# Patient Record
Sex: MALE | Race: WHITE | NOT HISPANIC OR LATINO | Employment: FULL TIME | URBAN - NONMETROPOLITAN AREA
[De-identification: names, ages, dates, MRNs, and addresses within clinical notes are randomized per-mention and may not be internally consistent; named-entity substitution may affect disease eponyms.]

---

## 2023-12-12 ENCOUNTER — OFFICE VISIT (OUTPATIENT)
Dept: PRIMARY CARE | Facility: CLINIC | Age: 63
End: 2023-12-12
Payer: COMMERCIAL

## 2023-12-12 ENCOUNTER — ANCILLARY PROCEDURE (OUTPATIENT)
Dept: RADIOLOGY | Facility: CLINIC | Age: 63
End: 2023-12-12
Payer: COMMERCIAL

## 2023-12-12 VITALS
SYSTOLIC BLOOD PRESSURE: 190 MMHG | HEIGHT: 71 IN | HEART RATE: 86 BPM | WEIGHT: 229.8 LBS | BODY MASS INDEX: 32.17 KG/M2 | OXYGEN SATURATION: 96 % | DIASTOLIC BLOOD PRESSURE: 110 MMHG

## 2023-12-12 DIAGNOSIS — M25.511 CHRONIC RIGHT SHOULDER PAIN: ICD-10-CM

## 2023-12-12 DIAGNOSIS — G89.29 CHRONIC RIGHT SHOULDER PAIN: ICD-10-CM

## 2023-12-12 DIAGNOSIS — I16.1 HYPERTENSIVE EMERGENCY: ICD-10-CM

## 2023-12-12 DIAGNOSIS — M25.552 LEFT HIP PAIN: Primary | ICD-10-CM

## 2023-12-12 DIAGNOSIS — I10 PRIMARY HYPERTENSION: ICD-10-CM

## 2023-12-12 PROCEDURE — 3080F DIAST BP >= 90 MM HG: CPT | Performed by: STUDENT IN AN ORGANIZED HEALTH CARE EDUCATION/TRAINING PROGRAM

## 2023-12-12 PROCEDURE — 73030 X-RAY EXAM OF SHOULDER: CPT | Mod: RIGHT SIDE | Performed by: RADIOLOGY

## 2023-12-12 PROCEDURE — 1036F TOBACCO NON-USER: CPT | Performed by: STUDENT IN AN ORGANIZED HEALTH CARE EDUCATION/TRAINING PROGRAM

## 2023-12-12 PROCEDURE — 73030 X-RAY EXAM OF SHOULDER: CPT | Mod: RT

## 2023-12-12 PROCEDURE — 99214 OFFICE O/P EST MOD 30 MIN: CPT | Performed by: STUDENT IN AN ORGANIZED HEALTH CARE EDUCATION/TRAINING PROGRAM

## 2023-12-12 PROCEDURE — 3077F SYST BP >= 140 MM HG: CPT | Performed by: STUDENT IN AN ORGANIZED HEALTH CARE EDUCATION/TRAINING PROGRAM

## 2023-12-12 RX ORDER — LISINOPRIL 10 MG/1
10 TABLET ORAL DAILY
Qty: 30 TABLET | Refills: 5 | Status: SHIPPED | OUTPATIENT
Start: 2023-12-12 | End: 2024-01-03 | Stop reason: ALTCHOICE

## 2023-12-12 RX ORDER — TEA TREE OIL 100 %
SPRAY, NON-AEROSOL (ML) TOPICAL
COMMUNITY

## 2023-12-12 RX ORDER — PROPRANOLOL HYDROCHLORIDE 20 MG/1
20 TABLET ORAL 2 TIMES DAILY
Qty: 60 TABLET | Refills: 5 | Status: SHIPPED | OUTPATIENT
Start: 2023-12-12 | End: 2024-01-15 | Stop reason: ALTCHOICE

## 2023-12-12 RX ORDER — AMLODIPINE BESYLATE 5 MG/1
5 TABLET ORAL DAILY
Qty: 30 TABLET | Refills: 5 | Status: SHIPPED | OUTPATIENT
Start: 2023-12-12 | End: 2024-01-03 | Stop reason: SDUPTHER

## 2023-12-12 RX ORDER — BACLOFEN 10 MG/1
10 TABLET ORAL 2 TIMES DAILY
Qty: 60 TABLET | Refills: 5 | Status: SHIPPED | OUTPATIENT
Start: 2023-12-12 | End: 2024-03-29 | Stop reason: ALTCHOICE

## 2023-12-12 NOTE — PROGRESS NOTES
Subjective   Patient ID: Jesus Rand is a 63 y.o. male who presents for Referral  (PT is here today for a referral to ortho. Reports he is having left hip pain. Denies injury. Reports this has been an on going issues, he has had an xray done and was told it was full of arthritis and would need a total hip replacement, was being followed by ortho but 2 days before appointment, he had to cancel the appointment due to a family emergency. Does not know if he needs another referral for them to also look at his right rotator cuff. ).    HPI    HTN:   His blood pressure is significantly high today.  No chest pain now. Chest pain a couple of days ago. No chest pain today.  No shortness of breath, palpitation, headache or blurry vision.  Starting him on Amlodipine and lisinopril. Also prescribing Propranolol for short term.  Discussed the potential for having a CVA or CAD.    Depression: His wife passed away about 6 months ago.  He is still having significant symptoms of grief and depression.  Reports that he cries most days.  Has a 18-year-old at home.  Plan: He likely needs medication and counseling.  However patient would like to wait for now.  Not starting medication as we are starting few medications for his blood pressure today.    Hip pain: Left hip arthritis was diagnosed about a year and a half ago.  Is bothering his sleep now.  Previously was recommended to get surgical intervention.  Family circumstances he was not able to get this taken care of.  Now he would like to see a specialist to discuss further interventions.  Requesting something for pain  Referral to Ortho.  Given that his blood pressure is high recommended to limit use of ibuprofen.  Starting on baclofen.     Right shoulder pain: The last few months.  It is bothering significantly now.  Wakes him up from sleep sometimes.  Rotator cuff injury was repaired many years ago.  Reports that his symptoms are similar.  X-ray ordered.  Referral to Ortho is  provided.    Review of Systems  ROS negative except discussed above in HPI.    Vitals:    12/12/23 1308   BP: (!) 190/110   Pulse: 86   SpO2: 96%     Objective   Physical Exam      Assessment/Plan   Jesus was seen today for referral .  Diagnoses and all orders for this visit:  Left hip pain (Primary)  -     Referral to Orthopaedic Surgery; Future  -     baclofen (Lioresal) 10 mg tablet; Take 1 tablet (10 mg) by mouth 2 times a day.  Chronic right shoulder pain  -     Referral to Orthopaedic Surgery; Future  -     Cancel: XR shoulder left 2+ views; Future  -     XR shoulder right 2+ views; Future  Hypertensive emergency  -     amLODIPine (Norvasc) 5 mg tablet; Take 1 tablet (5 mg) by mouth once daily.  -     lisinopril 10 mg tablet; Take 1 tablet (10 mg) by mouth once daily.  -     propranolol (Inderal) 20 mg tablet; Take 1 tablet (20 mg) by mouth 2 times a day.  Primary hypertension  -     amLODIPine (Norvasc) 5 mg tablet; Take 1 tablet (5 mg) by mouth once daily.  -     lisinopril 10 mg tablet; Take 1 tablet (10 mg) by mouth once daily.  -     propranolol (Inderal) 20 mg tablet; Take 1 tablet (20 mg) by mouth 2 times a day.      Follow up in 2 weeks.          Dave Galindo MD MPH

## 2023-12-12 NOTE — PATIENT INSTRUCTIONS
I am prescribing 3 blood pressure medications today.  He will start taking amlodipine daily once from today.  After 3 days if you do not develop any side effects from amlodipine start taking lisinopril.  For the first 2 days please take propranolol as needed up to 2 times a day if your blood pressure is significantly high which is > 180/110.     Your goal for the next 1 to 2 weeks he is to bring her blood pressure at least under 160/90.  Ideally we want the blood pressure to be brought below 140/85.    If you do develop any symptoms of chest pain, palpitations, shortness of breath, headaches or blurry vision please check your blood pressure, and if it is higher than 180/100 please go to the emergency department.    Please cut out all salt from your diet, as much as possible.

## 2023-12-20 ENCOUNTER — ANCILLARY PROCEDURE (OUTPATIENT)
Dept: RADIOLOGY | Facility: CLINIC | Age: 63
End: 2023-12-20
Payer: COMMERCIAL

## 2023-12-20 ENCOUNTER — OFFICE VISIT (OUTPATIENT)
Dept: ORTHOPEDIC SURGERY | Facility: CLINIC | Age: 63
End: 2023-12-20
Payer: COMMERCIAL

## 2023-12-20 DIAGNOSIS — M25.511 CHRONIC RIGHT SHOULDER PAIN: ICD-10-CM

## 2023-12-20 DIAGNOSIS — M25.552 LEFT HIP PAIN: ICD-10-CM

## 2023-12-20 DIAGNOSIS — G89.29 CHRONIC RIGHT SHOULDER PAIN: ICD-10-CM

## 2023-12-20 DIAGNOSIS — M16.12 OSTEOARTHRITIS OF LEFT HIP, UNSPECIFIED OSTEOARTHRITIS TYPE: ICD-10-CM

## 2023-12-20 DIAGNOSIS — M16.12 OSTEOARTHRITIS OF LEFT HIP, UNSPECIFIED OSTEOARTHRITIS TYPE: Primary | ICD-10-CM

## 2023-12-20 PROCEDURE — 73502 X-RAY EXAM HIP UNI 2-3 VIEWS: CPT | Mod: LEFT SIDE | Performed by: RADIOLOGY

## 2023-12-20 PROCEDURE — 73502 X-RAY EXAM HIP UNI 2-3 VIEWS: CPT | Mod: LT

## 2023-12-20 PROCEDURE — 99204 OFFICE O/P NEW MOD 45 MIN: CPT | Performed by: SPECIALIST

## 2023-12-20 PROCEDURE — 1036F TOBACCO NON-USER: CPT | Performed by: SPECIALIST

## 2023-12-20 ASSESSMENT — PAIN DESCRIPTION - DESCRIPTORS: DESCRIPTORS: ACHING;BURNING;PENETRATING

## 2023-12-20 ASSESSMENT — PAIN - FUNCTIONAL ASSESSMENT: PAIN_FUNCTIONAL_ASSESSMENT: 0-10

## 2023-12-20 ASSESSMENT — PAIN SCALES - GENERAL: PAINLEVEL_OUTOF10: 9

## 2023-12-20 NOTE — ASSESSMENT & PLAN NOTE
Assessment: Left hip osteoarthritis moderate to severe.    Right shoulder pain chronic.    Plan:  Preop for total hip replacement.  He was tentatively scheduled for 2/12/2024  Follow-up in approximately a month for reevaluation and his complete preoperative clearance.  Anesthesia meeting and clearance  Joint class  Dental exam  Internist visit  Continue to avoid activities that aggravate

## 2023-12-21 ENCOUNTER — APPOINTMENT (OUTPATIENT)
Dept: ORTHOPEDIC SURGERY | Facility: CLINIC | Age: 63
End: 2023-12-21
Payer: COMMERCIAL

## 2023-12-21 NOTE — PROGRESS NOTES
Plan:Assessment/Plan   Encounter Diagnoses:  Osteoarthritis of left hip, unspecified osteoarthritis type    Left hip pain    Chronic right shoulder pain  Osteoarthritis of left hip  Assessment: Left hip osteoarthritis moderate to severe.    Right shoulder pain chronic.    Plan:  Preop for total hip replacement.  He was tentatively scheduled for 2/12/2024  Follow-up in approximately a month for reevaluation and his complete preoperative clearance.  Anesthesia meeting and clearance  Joint class  Dental exam  Internist visit  Continue to avoid activities that aggravate       Subjective    Patient ID: Jesus Rand is a 63 y.o. male.    Chief Complaint: Pain of the Left Hip (NPV/LEFT HIP PAIN/PAIN RATE 9/X-RAYS 12-20-23) and Right Shoulder Pain (RIGHT SHOULDER/PAIN RATE 8/X-RAYS 12-12-23)     Last Surgery: No surgery found  Last Surgery Date: No surgery found    HPI  63-year-old with significant left hip arthritis.  Patient has exhausted nonsurgical treatments.  He has tried nonsteroidal anti-inflammatories, he has done physical therapy, he is rested, he is modified his sleeping pattern and habits.  He wishes to proceed forward with joint replacement as his activities of daily living and his sleeping are adversely affected by his left hip pain.OBJECTIVE: ORTHO EXAM  Left hip:  [Normal  Antalgic ]gait  Negative Trendelenburg sign  Skin healthy and intact  No tenderness to palpation over lumbar spine  Moderate tenderness over greater trochanter  Moderate tenderness referable to groin especially with IR     Full forward flexion  Symmetric motion, no loss of internal rotation   No weakness with resisted hip flexion, abduction or adduction     Positive flexion/adduction/internal rotation  Negative flexion/abduction/external rotation test  Negative straight leg raise  Neurovascular exam normal distally    IMAGE RESULTS:  XR shoulder right 2+ views  Narrative: Interpreted By:  Judy Newsome,   STUDY:  XR SHOULDER RIGHT 2+  VIEWS;  12/12/2023 2:41 pm      INDICATION:  Signs/Symptoms:right shoulder pain.      COMPARISON:  None.      ACCESSION NUMBER(S):  [id number]      ORDERING CLINICIAN:  JUNIOR MANCUSO      FINDINGS:  There is no evidence for acute fracture or dislocation.      Hypertrophic bony changes are seen at the acromioclavicular joint,  which narrow the joint.  Narrowing of the glenohumeral joint is seen  with associated sclerosis and spur formation. Small calcific  densities are seen at the level of the supraspinatus tendon, which  may be related to calcific tendinitis.      No lytic or blastic lesions are seen.      Impression: Degenerative changes.      Possible calcific tendinitis.      MACRO:  None      Signed by: Judy Newsome 12/13/2023 8:52 PM  Dictation workstation:   [id number]      ULTRASOUND  None    Procedures     Orders Placed This Encounter    Staphylococcus aureus/MRSA colonization, Culture    Point of Care Ultrasound    XR hip left with pelvis when performed 2 or 3 views    Point of Care Ultrasound    Basic Metabolic Panel    Prealbumin    CBC    Type And Screen    Case Request Operating Room: Arthroplasty Total Hip Posterior Approach

## 2024-01-03 ENCOUNTER — OFFICE VISIT (OUTPATIENT)
Dept: PRIMARY CARE | Facility: CLINIC | Age: 64
End: 2024-01-03
Payer: COMMERCIAL

## 2024-01-03 ENCOUNTER — LAB (OUTPATIENT)
Dept: LAB | Facility: LAB | Age: 64
End: 2024-01-03
Payer: COMMERCIAL

## 2024-01-03 VITALS
WEIGHT: 228 LBS | OXYGEN SATURATION: 97 % | HEART RATE: 83 BPM | BODY MASS INDEX: 31.8 KG/M2 | SYSTOLIC BLOOD PRESSURE: 150 MMHG | DIASTOLIC BLOOD PRESSURE: 84 MMHG

## 2024-01-03 DIAGNOSIS — I10 PRIMARY HYPERTENSION: ICD-10-CM

## 2024-01-03 DIAGNOSIS — I16.1 HYPERTENSIVE EMERGENCY: ICD-10-CM

## 2024-01-03 LAB
ANION GAP SERPL CALC-SCNC: 11 MMOL/L (ref 10–20)
BUN SERPL-MCNC: 25 MG/DL (ref 6–23)
CALCIUM SERPL-MCNC: 9.3 MG/DL (ref 8.6–10.3)
CHLORIDE SERPL-SCNC: 104 MMOL/L (ref 98–107)
CO2 SERPL-SCNC: 28 MMOL/L (ref 21–32)
CREAT SERPL-MCNC: 1.07 MG/DL (ref 0.5–1.3)
GFR SERPL CREATININE-BSD FRML MDRD: 79 ML/MIN/1.73M*2
GLUCOSE SERPL-MCNC: 108 MG/DL (ref 74–99)
POTASSIUM SERPL-SCNC: 4.1 MMOL/L (ref 3.5–5.3)
SODIUM SERPL-SCNC: 139 MMOL/L (ref 136–145)

## 2024-01-03 PROCEDURE — 36415 COLL VENOUS BLD VENIPUNCTURE: CPT

## 2024-01-03 PROCEDURE — 3079F DIAST BP 80-89 MM HG: CPT | Performed by: STUDENT IN AN ORGANIZED HEALTH CARE EDUCATION/TRAINING PROGRAM

## 2024-01-03 PROCEDURE — 99213 OFFICE O/P EST LOW 20 MIN: CPT | Performed by: STUDENT IN AN ORGANIZED HEALTH CARE EDUCATION/TRAINING PROGRAM

## 2024-01-03 PROCEDURE — 3077F SYST BP >= 140 MM HG: CPT | Performed by: STUDENT IN AN ORGANIZED HEALTH CARE EDUCATION/TRAINING PROGRAM

## 2024-01-03 PROCEDURE — 1036F TOBACCO NON-USER: CPT | Performed by: STUDENT IN AN ORGANIZED HEALTH CARE EDUCATION/TRAINING PROGRAM

## 2024-01-03 PROCEDURE — 80048 BASIC METABOLIC PNL TOTAL CA: CPT

## 2024-01-03 RX ORDER — AMLODIPINE BESYLATE 5 MG/1
5 TABLET ORAL DAILY
Qty: 90 TABLET | Refills: 3 | Status: SHIPPED | OUTPATIENT
Start: 2024-01-03 | End: 2024-01-15 | Stop reason: SDUPTHER

## 2024-01-03 RX ORDER — CHLORTHALIDONE 25 MG/1
25 TABLET ORAL DAILY
Qty: 30 TABLET | Refills: 0 | Status: SHIPPED | OUTPATIENT
Start: 2024-01-03 | End: 2024-01-26

## 2024-01-03 ASSESSMENT — PATIENT HEALTH QUESTIONNAIRE - PHQ9
1. LITTLE INTEREST OR PLEASURE IN DOING THINGS: NOT AT ALL
SUM OF ALL RESPONSES TO PHQ9 QUESTIONS 1 AND 2: 0
2. FEELING DOWN, DEPRESSED OR HOPELESS: NOT AT ALL

## 2024-01-03 NOTE — PROGRESS NOTES
Subjective   Patient ID: Jesus Rand is a 63 y.o. male who presents for Follow-up (PT is here today for 2 week fuv. Reports the Lisinopril is making him sick to his stomach. ).    HPI    Here for follow-up.     HTN: here for follow up regarding HTN. Reports that he was able to tolerate Amlodipine well, but not the lisinopril. He had upset stomach with it. He has been taking Propranolol regularly.   Plan: BP still high slightly. Stopping lisinopril. Starting Chlorthalidone. Plan to stop Propranolol gradually.     Review of Systems  ROS negative except discussed above in HPI.    Vitals:    01/03/24 0958   BP: 150/84   Pulse: 83   SpO2: 97%     Objective   Physical Exam  Constitutional:       Appearance: Normal appearance.   Cardiovascular:      Rate and Rhythm: Normal rate and regular rhythm.   Pulmonary:      Effort: Pulmonary effort is normal.      Breath sounds: Normal breath sounds.   Musculoskeletal:      Cervical back: Normal range of motion and neck supple.   Lymphadenopathy:      Cervical: No cervical adenopathy.   Neurological:      Mental Status: He is alert.           Assessment/Plan   Jesus was seen today for follow-up.  Diagnoses and all orders for this visit:  Hypertensive emergency  -     amLODIPine (Norvasc) 5 mg tablet; Take 1 tablet (5 mg) by mouth once daily.  Primary hypertension  -     amLODIPine (Norvasc) 5 mg tablet; Take 1 tablet (5 mg) by mouth once daily.  -     chlorthalidone (Hygroton) 25 mg tablet; Take 1 tablet (25 mg) by mouth once daily.  -     Basic Metabolic Panel; Future      Follow up in 2-3 weeks for preoperative eval.      Dave Galindo MD MPH

## 2024-01-15 ENCOUNTER — OFFICE VISIT (OUTPATIENT)
Dept: PRIMARY CARE | Facility: CLINIC | Age: 64
End: 2024-01-15
Payer: COMMERCIAL

## 2024-01-15 ENCOUNTER — LAB (OUTPATIENT)
Dept: LAB | Facility: LAB | Age: 64
End: 2024-01-15
Payer: COMMERCIAL

## 2024-01-15 ENCOUNTER — HOSPITAL ENCOUNTER (OUTPATIENT)
Dept: CARDIOLOGY | Facility: HOSPITAL | Age: 64
Discharge: HOME | End: 2024-01-15
Payer: COMMERCIAL

## 2024-01-15 VITALS
OXYGEN SATURATION: 98 % | WEIGHT: 225.6 LBS | BODY MASS INDEX: 31.46 KG/M2 | HEART RATE: 75 BPM | DIASTOLIC BLOOD PRESSURE: 100 MMHG | SYSTOLIC BLOOD PRESSURE: 150 MMHG

## 2024-01-15 DIAGNOSIS — I16.1 HYPERTENSIVE EMERGENCY: ICD-10-CM

## 2024-01-15 DIAGNOSIS — Z01.818 PRE-OP EVALUATION: Primary | ICD-10-CM

## 2024-01-15 DIAGNOSIS — I10 PRIMARY HYPERTENSION: ICD-10-CM

## 2024-01-15 DIAGNOSIS — M16.12 OSTEOARTHRITIS OF LEFT HIP, UNSPECIFIED OSTEOARTHRITIS TYPE: ICD-10-CM

## 2024-01-15 DIAGNOSIS — Z01.818 PRE-OP EVALUATION: ICD-10-CM

## 2024-01-15 LAB
ABO GROUP (TYPE) IN BLOOD: NORMAL
ANION GAP SERPL CALC-SCNC: 13 MMOL/L (ref 10–20)
ANTIBODY SCREEN: NORMAL
BUN SERPL-MCNC: 23 MG/DL (ref 6–23)
CALCIUM SERPL-MCNC: 9.7 MG/DL (ref 8.6–10.3)
CHLORIDE SERPL-SCNC: 101 MMOL/L (ref 98–107)
CO2 SERPL-SCNC: 29 MMOL/L (ref 21–32)
CREAT SERPL-MCNC: 1.01 MG/DL (ref 0.5–1.3)
EGFRCR SERPLBLD CKD-EPI 2021: 85 ML/MIN/1.73M*2
ERYTHROCYTE [DISTWIDTH] IN BLOOD BY AUTOMATED COUNT: 12.2 % (ref 11.5–14.5)
GLUCOSE SERPL-MCNC: 101 MG/DL (ref 74–99)
HCT VFR BLD AUTO: 42.3 % (ref 41–52)
HGB BLD-MCNC: 14.5 G/DL (ref 13.5–17.5)
MCH RBC QN AUTO: 31.4 PG (ref 26–34)
MCHC RBC AUTO-ENTMCNC: 34.3 G/DL (ref 32–36)
MCV RBC AUTO: 92 FL (ref 80–100)
NRBC BLD-RTO: 0 /100 WBCS (ref 0–0)
PLATELET # BLD AUTO: 315 X10*3/UL (ref 150–450)
POTASSIUM SERPL-SCNC: 3.7 MMOL/L (ref 3.5–5.3)
RBC # BLD AUTO: 4.62 X10*6/UL (ref 4.5–5.9)
RH FACTOR (ANTIGEN D): NORMAL
SODIUM SERPL-SCNC: 139 MMOL/L (ref 136–145)
WBC # BLD AUTO: 10.3 X10*3/UL (ref 4.4–11.3)

## 2024-01-15 PROCEDURE — 3080F DIAST BP >= 90 MM HG: CPT | Performed by: STUDENT IN AN ORGANIZED HEALTH CARE EDUCATION/TRAINING PROGRAM

## 2024-01-15 PROCEDURE — 86850 RBC ANTIBODY SCREEN: CPT

## 2024-01-15 PROCEDURE — 86900 BLOOD TYPING SEROLOGIC ABO: CPT

## 2024-01-15 PROCEDURE — 93010 ELECTROCARDIOGRAM REPORT: CPT | Performed by: STUDENT IN AN ORGANIZED HEALTH CARE EDUCATION/TRAINING PROGRAM

## 2024-01-15 PROCEDURE — 86901 BLOOD TYPING SEROLOGIC RH(D): CPT

## 2024-01-15 PROCEDURE — 93005 ELECTROCARDIOGRAM TRACING: CPT

## 2024-01-15 PROCEDURE — 99213 OFFICE O/P EST LOW 20 MIN: CPT | Performed by: STUDENT IN AN ORGANIZED HEALTH CARE EDUCATION/TRAINING PROGRAM

## 2024-01-15 PROCEDURE — 80048 BASIC METABOLIC PNL TOTAL CA: CPT

## 2024-01-15 PROCEDURE — 36415 COLL VENOUS BLD VENIPUNCTURE: CPT

## 2024-01-15 PROCEDURE — 85027 COMPLETE CBC AUTOMATED: CPT

## 2024-01-15 PROCEDURE — 1036F TOBACCO NON-USER: CPT | Performed by: STUDENT IN AN ORGANIZED HEALTH CARE EDUCATION/TRAINING PROGRAM

## 2024-01-15 PROCEDURE — 84134 ASSAY OF PREALBUMIN: CPT

## 2024-01-15 PROCEDURE — 3077F SYST BP >= 140 MM HG: CPT | Performed by: STUDENT IN AN ORGANIZED HEALTH CARE EDUCATION/TRAINING PROGRAM

## 2024-01-15 RX ORDER — CARVEDILOL 6.25 MG/1
6.25 TABLET ORAL
Qty: 60 TABLET | Refills: 11 | Status: SHIPPED | OUTPATIENT
Start: 2024-01-15 | End: 2024-01-29 | Stop reason: SDUPTHER

## 2024-01-15 RX ORDER — AMLODIPINE BESYLATE 10 MG/1
5 TABLET ORAL DAILY
Qty: 45 TABLET | Refills: 3 | Status: SHIPPED | OUTPATIENT
Start: 2024-01-15 | End: 2024-03-29 | Stop reason: SDUPTHER

## 2024-01-15 ASSESSMENT — PATIENT HEALTH QUESTIONNAIRE - PHQ9
2. FEELING DOWN, DEPRESSED OR HOPELESS: NOT AT ALL
1. LITTLE INTEREST OR PLEASURE IN DOING THINGS: NOT AT ALL
SUM OF ALL RESPONSES TO PHQ9 QUESTIONS 1 AND 2: 0

## 2024-01-15 NOTE — PROGRESS NOTES
Subjective   Patient ID: Jesus Rand is a 63 y.o. male who presents for Pre-op Exam .    [facility]    PT is here today for a pre op clearance.     Surgery is on 02/12/24 and having a total left hip replacement. Denies having any prior testing. PT BP is up today but reports it is normally in range.     No hx of bleeding diathesis.   No hx of complications from anaesthesia.     HTN: High today. No symptoms. Denies chest pain, palpitations, headache or shortness of breath.   Reports at home blood pressure is normal.   increasing the dose of amlodipine,   Starting carvedilol instead of Propranolol.     Review of Systems  ROS negative except discussed above in HPI.    Vitals:    01/15/24 1350   BP: (!) 150/100   Pulse: 75   SpO2: 98%     Objective   Physical Exam  Constitutional:       Appearance: Normal appearance.   Cardiovascular:      Rate and Rhythm: Normal rate and regular rhythm.   Pulmonary:      Effort: Pulmonary effort is normal.      Breath sounds: Normal breath sounds.   Musculoskeletal:      Cervical back: Normal range of motion and neck supple.      Right lower leg: No edema.      Left lower leg: No edema.   Lymphadenopathy:      Cervical: No cervical adenopathy.   Neurological:      Mental Status: He is alert.           Assessment/Plan   Jesus was seen today for pre-op exam.  Diagnoses and all orders for this visit:  Pre-op evaluation (Primary)  -     ECG 12 lead (Ancillary Performed); Future  Hypertensive emergency  -     ECG 12 lead (Ancillary Performed); Future  -     amLODIPine (Norvasc) 10 mg tablet; Take 0.5 tablets (5 mg) by mouth once daily.  -     carvedilol (Coreg) 6.25 mg tablet; Take 1 tablet (6.25 mg) by mouth 2 times a day with meals.  Primary hypertension  -     ECG 12 lead (Ancillary Performed); Future  -     amLODIPine (Norvasc) 10 mg tablet; Take 0.5 tablets (5 mg) by mouth once daily.  -     carvedilol (Coreg) 6.25 mg tablet; Take 1 tablet (6.25 mg) by mouth 2 times a day with  meals.    Overall stable but need blood pressure under control.   Going to get EKG and labs done soon.     Follow up in 2 weeks.     Dave Galindo MD MPH

## 2024-01-16 LAB
ATRIAL RATE: 61 BPM
P AXIS: 58 DEGREES
P OFFSET: 203 MS
P ONSET: 146 MS
PR INTERVAL: 130 MS
PREALB SERPL-MCNC: 29 MG/DL (ref 18–40)
Q ONSET: 211 MS
QRS COUNT: 10 BEATS
QRS DURATION: 102 MS
QT INTERVAL: 418 MS
QTC CALCULATION(BAZETT): 420 MS
QTC FREDERICIA: 420 MS
R AXIS: -29 DEGREES
T AXIS: 27 DEGREES
T OFFSET: 420 MS
VENTRICULAR RATE: 61 BPM

## 2024-01-22 DIAGNOSIS — Z01.818 PRE-OP EVALUATION: ICD-10-CM

## 2024-01-22 DIAGNOSIS — G89.29 CHRONIC PAIN OF LEFT KNEE: ICD-10-CM

## 2024-01-22 DIAGNOSIS — M25.562 CHRONIC PAIN OF LEFT KNEE: ICD-10-CM

## 2024-01-22 DIAGNOSIS — M17.12 PRIMARY OSTEOARTHRITIS OF LEFT KNEE: ICD-10-CM

## 2024-01-22 DIAGNOSIS — D62 ANEMIA ASSOCIATED WITH ACUTE BLOOD LOSS: ICD-10-CM

## 2024-01-22 DIAGNOSIS — M25.562 ARTHRALGIA OF LEFT KNEE: ICD-10-CM

## 2024-01-23 NOTE — PREPROCEDURE INSTRUCTIONS
No outpatient medications have been marked as taking for the 2/26/24 encounter (Hospital Encounter).                       NPO Instructions    Additional Instructions:     Will receive call day before surgery with arrival time

## 2024-01-25 ENCOUNTER — HOSPITAL ENCOUNTER (OUTPATIENT)
Dept: RADIOLOGY | Facility: HOSPITAL | Age: 64
Discharge: HOME | End: 2024-01-25
Payer: COMMERCIAL

## 2024-01-25 ENCOUNTER — DOCUMENTATION (OUTPATIENT)
Dept: PHYSICAL THERAPY | Facility: HOSPITAL | Age: 64
End: 2024-01-25
Payer: COMMERCIAL

## 2024-01-25 ENCOUNTER — PRE-ADMISSION TESTING (OUTPATIENT)
Dept: PREADMISSION TESTING | Facility: HOSPITAL | Age: 64
End: 2024-01-25
Payer: COMMERCIAL

## 2024-01-25 DIAGNOSIS — Z01.818 PRE-OP EVALUATION: ICD-10-CM

## 2024-01-25 DIAGNOSIS — I10 PRIMARY HYPERTENSION: ICD-10-CM

## 2024-01-25 PROCEDURE — 71046 X-RAY EXAM CHEST 2 VIEWS: CPT

## 2024-01-25 NOTE — PROGRESS NOTES
"Physical Therapy Pre-Op Notes    Patient Name: Jesus Rand  MRN: [id number]  Today's Date: 2024    Surgeon: RM  Scheduled Date of Surgery:2024  Sex: male  : 1960  Age: 63 y.o.   Height:5'11\"  Current Pain Level:8    Therapist to complete:  Knee ROM Left NA   Knee ROM Right NA     Walking Aid used now  No Device X   Walker    Rollator    Cane    Crutches      Joint Replacement to be done:                                      L knee    R Knee    Both knees    L hip X   R hip    Both hips    L shoulder    R shoulder      Support System:  Live alone    Live with spouse    Live with significant other    Live with children X   Live with Parent      Home Responsibilities:  Laundry X   Home cleaning X   Meal preparation X     Work Status:  Full time X   Part time    Retired    Disabled    Unemployed     Homemaker    Other      Home/Environment Style:  Ranch X   Multiple story/only    Split Level    Two-story    Condo    Apartment    Mobile    Other       From the hospital, they plan to go:  Home with home care X   Skilled Nursing Facility    Not sure      Level in home:    Basement Main Level 2nd Level   Bedroom  X    Main bath  X    Second bathroom  X    Kitchen  X    Laundry  X      Style of Bath:  Tub Only    Shower-in-tub    Walk-in Shower X     Handrail in home going up the steps:   #of steps Ramp Handrail on R Handrail on L   To enter house  X X X   From basement NA      To Second level NA        Equipment in home:  Cane X Walker    FWW X Rollator    Wheelchair  Crutches    Tub seat  Grab bars in toilet X   Grab bars in shower  Raised toilet seat    Reacher/grabber  Sock aid    Bath aids  Long shoe horn X   Elastic shoe laces  Long handled bath sponge            "

## 2024-01-26 RX ORDER — CHLORTHALIDONE 25 MG/1
25 TABLET ORAL DAILY
Qty: 90 TABLET | Refills: 1 | Status: SHIPPED | OUTPATIENT
Start: 2024-01-26 | End: 2024-03-29 | Stop reason: SDUPTHER

## 2024-01-29 ENCOUNTER — OFFICE VISIT (OUTPATIENT)
Dept: PRIMARY CARE | Facility: CLINIC | Age: 64
End: 2024-01-29
Payer: COMMERCIAL

## 2024-01-29 VITALS
SYSTOLIC BLOOD PRESSURE: 140 MMHG | DIASTOLIC BLOOD PRESSURE: 80 MMHG | HEART RATE: 84 BPM | WEIGHT: 225.4 LBS | BODY MASS INDEX: 31.44 KG/M2

## 2024-01-29 DIAGNOSIS — I10 PRIMARY HYPERTENSION: ICD-10-CM

## 2024-01-29 DIAGNOSIS — I16.1 HYPERTENSIVE EMERGENCY: ICD-10-CM

## 2024-01-29 DIAGNOSIS — Z01.818 PRE-OP EVALUATION: Primary | ICD-10-CM

## 2024-01-29 PROCEDURE — 99213 OFFICE O/P EST LOW 20 MIN: CPT | Performed by: STUDENT IN AN ORGANIZED HEALTH CARE EDUCATION/TRAINING PROGRAM

## 2024-01-29 PROCEDURE — 3079F DIAST BP 80-89 MM HG: CPT | Performed by: STUDENT IN AN ORGANIZED HEALTH CARE EDUCATION/TRAINING PROGRAM

## 2024-01-29 PROCEDURE — 3077F SYST BP >= 140 MM HG: CPT | Performed by: STUDENT IN AN ORGANIZED HEALTH CARE EDUCATION/TRAINING PROGRAM

## 2024-01-29 PROCEDURE — 1036F TOBACCO NON-USER: CPT | Performed by: STUDENT IN AN ORGANIZED HEALTH CARE EDUCATION/TRAINING PROGRAM

## 2024-01-29 RX ORDER — CARVEDILOL 12.5 MG/1
12.5 TABLET ORAL
Qty: 60 TABLET | Refills: 11 | Status: SHIPPED | OUTPATIENT
Start: 2024-01-29 | End: 2024-03-29 | Stop reason: SDUPTHER

## 2024-01-29 ASSESSMENT — PATIENT HEALTH QUESTIONNAIRE - PHQ9
1. LITTLE INTEREST OR PLEASURE IN DOING THINGS: NOT AT ALL
2. FEELING DOWN, DEPRESSED OR HOPELESS: NOT AT ALL
SUM OF ALL RESPONSES TO PHQ9 QUESTIONS 1 AND 2: 0

## 2024-01-29 NOTE — PROGRESS NOTES
Subjective   Patient ID: Jesus Rand is a 63 y.o. male who presents for Follow-up (PT is here today for 2 week FUV. Reports he feels like his BP has been high even when he takes readings at home. ).    HPI    PT is here today for a pre op clearance.     Surgery is on 02/26/24 and having a total left hip replacement. Denies having any prior testing. PT BP is up today but reports it is normally in range.     No hx of bleeding diathesis.   No hx of complications from anaesthesia.     HTN: slightly high today. No symptoms. Denies chest pain, palpitations, headache or shortness of breath.   Reports at home blood pressure is normal.   increasing the dose of carvedilol.       Review of Systems  ROS negative except discussed above in HPI.    Vitals:    01/29/24 1600   BP: 140/80   Pulse: 84     Objective   Physical Exam  Constitutional:       Appearance: Normal appearance.   Cardiovascular:      Rate and Rhythm: Normal rate and regular rhythm.      Pulses: Normal pulses.      Heart sounds: Normal heart sounds.   Pulmonary:      Effort: Pulmonary effort is normal.      Breath sounds: Normal breath sounds.   Neurological:      Mental Status: He is alert.           Assessment/Plan   Jesus was seen today for follow-up.  Diagnoses and all orders for this visit:  Pre-op evaluation (Primary)  Hypertensive emergency  -     carvedilol (Coreg) 12.5 mg tablet; Take 1 tablet (12.5 mg) by mouth 2 times a day with meals.  Primary hypertension  -     carvedilol (Coreg) 12.5 mg tablet; Take 1 tablet (12.5 mg) by mouth 2 times a day with meals.      Patient is medically stable for the procedure planned.     Revised Cardiac Risk Index for Pre-Operative Risk  Estimates risk of cardiac complications after noncardiac surgery=3.9    Follow up in 2 months     Dave Galindo MD MPH

## 2024-01-30 ENCOUNTER — OFFICE VISIT (OUTPATIENT)
Dept: ORTHOPEDIC SURGERY | Facility: CLINIC | Age: 64
End: 2024-01-30
Payer: COMMERCIAL

## 2024-01-30 VITALS
DIASTOLIC BLOOD PRESSURE: 81 MMHG | RESPIRATION RATE: 18 BRPM | HEART RATE: 72 BPM | SYSTOLIC BLOOD PRESSURE: 153 MMHG | BODY MASS INDEX: 31.52 KG/M2 | WEIGHT: 226 LBS

## 2024-01-30 DIAGNOSIS — M25.552 LEFT HIP PAIN: ICD-10-CM

## 2024-01-30 PROCEDURE — 99215 OFFICE O/P EST HI 40 MIN: CPT | Performed by: SPECIALIST

## 2024-01-30 PROCEDURE — 1036F TOBACCO NON-USER: CPT | Performed by: SPECIALIST

## 2024-01-30 RX ORDER — OXYCODONE AND ACETAMINOPHEN 5; 325 MG/1; MG/1
1 TABLET ORAL EVERY 6 HOURS PRN
Qty: 28 TABLET | Refills: 0 | Status: SHIPPED | OUTPATIENT
Start: 2024-01-30 | End: 2024-02-06

## 2024-01-30 RX ORDER — ASPIRIN 81 MG/1
81 TABLET ORAL DAILY
Qty: 30 TABLET | Refills: 1 | Status: ON HOLD | OUTPATIENT
Start: 2024-01-30 | End: 2024-02-26

## 2024-01-30 ASSESSMENT — PAIN SCALES - GENERAL: PAINLEVEL_OUTOF10: 7

## 2024-01-30 ASSESSMENT — PAIN DESCRIPTION - DESCRIPTORS: DESCRIPTORS: ACHING;DULL

## 2024-01-30 ASSESSMENT — PAIN - FUNCTIONAL ASSESSMENT: PAIN_FUNCTIONAL_ASSESSMENT: 0-10

## 2024-01-30 NOTE — ASSESSMENT & PLAN NOTE
Assessment: Left hip arthritis requiring total hip arthroplasty.  Patient is having trouble with activities of daily living.  He is having sleep disturbance.  It is interfering with his independence.  He wishes to proceed forward with total hip arthroplasty.  He understands the risks and benefits.    Plan:  Surgical planning was done today.  He was given the antibiotic surgical soap for preop.  He has gotten his medical clearance.  He is gotten his dental work done.  He understands posterior precautions.  Follow-up a week after his planned surgery.  He is scheduled for 2/26/2024.

## 2024-02-01 NOTE — PROGRESS NOTES
Plan:Assessment/Plan   Encounter Diagnoses:  Left hip pain  Arthritis of left hip  Assessment: Left hip arthritis requiring total hip arthroplasty.  Patient is having trouble with activities of daily living.  He is having sleep disturbance.  It is interfering with his independence.  He wishes to proceed forward with total hip arthroplasty.  He understands the risks and benefits.    Plan:  Surgical planning was done today.  He was given the antibiotic surgical soap for preop.  He has gotten his medical clearance.  He is gotten his dental work done.  He understands posterior precautions.  Follow-up a week after his planned surgery.  He is scheduled for 2/26/2024.       Subjective    Patient ID: Jesus Rand is a 63 y.o. male.    Chief Complaint: Pre-op Exam of the Left Hip (Patient is scheduled for a left arthoplasty on 02/26/2024)     Last Surgery: No surgery found  Last Surgery Date: No surgery found    HPI  63-year-old with significant left hip arthritis.  Patient has exhausted nonsurgical treatments.  He has tried nonsteroidal anti-inflammatories, he has done physical therapy, he is rested, he is modified his sleeping pattern and habits.  He wishes to proceed forward with joint replacement as his activities of daily living and his sleeping are adversely affected by his left hip pain.OBJECTIVE: ORTHO EXAM  Left hip:  [Normal  Antalgic ]gait  Negative Trendelenburg sign  Skin healthy and intact  No tenderness to palpation over lumbar spine  Moderate tenderness over greater trochanter  Moderate tenderness referable to groin especially with IR     Full forward flexion  Symmetric motion, no loss of internal rotation   No weakness with resisted hip flexion, abduction or adduction     Positive flexion/adduction/internal rotation  Negative flexion/abduction/external rotation test  Negative straight leg raise  Neurovascular exam normal distally           Review of Systems:  Constitutional: NEGATIVE: Fever, Chills,  Anorexia, Weight Loss, Malaise  Eyes: NEGATIVE: Blurry Vision, Drainage, Diploplia, Redness, Vision Loss/ Change  ENMT: NEGATIVE: Nasal Discharge, Nasal Congestion, Ear Pain, Mouth Pain, Throat Pain  Respiratory: NEGATIVE: Dry Cough, Productive Cough, Hemoptysis, Wheezing, Shortness of Breath  Cardiac: NEGATIVE: Chest Pain, Dyspnea on Exertion, Orthopnea, Palpitations, Syncope  Gastrointestinal: NEGATIVE: Nausea, Vomiting, Diarrhea, Constipation, Abdominal Pain  Genitourinary: NEGATIVE: Discharge, Dysuria, Flank Pain, Frequency, Hematuria  Musculoskeletal: POSITIVE: Decreased ROM, Pain, Stiffness, Weakness;   Neurological: NEGATIVE: Dizziness, Confusion, Headache, Seizures, Syncope        Physical  Examination:  Constitutional: Well developed, awake/alert/oriented x3, no distress, alert and cooperative  Eyes: PERRL, EOMI, clear sclera  ENMT: mucous membranes moist, no apparent injury, no lesions seen  Head/Neck: Neck supple, no apparent injury, thyroid without obvoius mass or tenderness, No JVD, trachea midline, no bruits  Respiratory/Thorax: Patent airways, CTAB, normal breath sounds with good chest expansion, thorax symmetric  Cardiovascular: Regular, rate and rhythm, mild systolic murmur, 2+ equal pulses of the extremities, normal S 1and S 2  Gastrointestinal: Nondistended, soft, non-tender, no rebound tenderness or guarding, no masses palpable, no organomegaly, +BS, no bruits  Musculoskeletal: [See above]  Extremities: normal extremities, no cyanosis edema, contusions, no clubbing  Neurological: alert and oriented x3, intact senses, motor, normal strength  Psychological: Appropriate mood and behavior  Skin: Warm and dry, no lesions, no rashes         IMAGE RESULTS:  XR chest 2 views  Narrative: Interpreted By:  Jose Leblanc,   STUDY:  XR CHEST 2 VIEWS;  1/25/2024 6:42 am      INDICATION:  Signs/Symptoms:pre-op.      COMPARISON:  None.      ACCESSION NUMBER(S):  [id number]      ORDERING CLINICIAN:  MADINA  LEB      FINDINGS:  PA and lateral radiographs of the chest were provided.      DEVICES:  None      CARDIOMEDIASTINAL SILHOUETTE:  Cardiomediastinal silhouette is normal in size and configuration.No  significant atherosclerotic calcification.      LUNGS:  No focal consolidation. No pneumothorax. No pleural effusion.      BONES:  No acute osseous changes.      Impression: 1.  No evidence of acute cardiopulmonary process.          Signed by: Jose Leblanc 1/26/2024 9:14 AM  Dictation workstation:   [id number]      ULTRASOUND  None    Procedures     Orders Placed This Encounter    Point of Care Ultrasound    oxyCODONE-acetaminophen (Percocet) 5-325 mg tablet    aspirin 81 mg EC tablet

## 2024-02-01 NOTE — H&P (VIEW-ONLY)
Plan:Assessment/Plan   Encounter Diagnoses:  Left hip pain  Arthritis of left hip  Assessment: Left hip arthritis requiring total hip arthroplasty.  Patient is having trouble with activities of daily living.  He is having sleep disturbance.  It is interfering with his independence.  He wishes to proceed forward with total hip arthroplasty.  He understands the risks and benefits.    Plan:  Surgical planning was done today.  He was given the antibiotic surgical soap for preop.  He has gotten his medical clearance.  He is gotten his dental work done.  He understands posterior precautions.  Follow-up a week after his planned surgery.  He is scheduled for 2/26/2024.       Subjective    Patient ID: Jesus Rand is a 63 y.o. male.    Chief Complaint: Pre-op Exam of the Left Hip (Patient is scheduled for a left arthoplasty on 02/26/2024)     Last Surgery: No surgery found  Last Surgery Date: No surgery found    HPI  63-year-old with significant left hip arthritis.  Patient has exhausted nonsurgical treatments.  He has tried nonsteroidal anti-inflammatories, he has done physical therapy, he is rested, he is modified his sleeping pattern and habits.  He wishes to proceed forward with joint replacement as his activities of daily living and his sleeping are adversely affected by his left hip pain.OBJECTIVE: ORTHO EXAM  Left hip:  [Normal  Antalgic ]gait  Negative Trendelenburg sign  Skin healthy and intact  No tenderness to palpation over lumbar spine  Moderate tenderness over greater trochanter  Moderate tenderness referable to groin especially with IR     Full forward flexion  Symmetric motion, no loss of internal rotation   No weakness with resisted hip flexion, abduction or adduction     Positive flexion/adduction/internal rotation  Negative flexion/abduction/external rotation test  Negative straight leg raise  Neurovascular exam normal distally           Review of Systems:  Constitutional: NEGATIVE: Fever, Chills,  Anorexia, Weight Loss, Malaise  Eyes: NEGATIVE: Blurry Vision, Drainage, Diploplia, Redness, Vision Loss/ Change  ENMT: NEGATIVE: Nasal Discharge, Nasal Congestion, Ear Pain, Mouth Pain, Throat Pain  Respiratory: NEGATIVE: Dry Cough, Productive Cough, Hemoptysis, Wheezing, Shortness of Breath  Cardiac: NEGATIVE: Chest Pain, Dyspnea on Exertion, Orthopnea, Palpitations, Syncope  Gastrointestinal: NEGATIVE: Nausea, Vomiting, Diarrhea, Constipation, Abdominal Pain  Genitourinary: NEGATIVE: Discharge, Dysuria, Flank Pain, Frequency, Hematuria  Musculoskeletal: POSITIVE: Decreased ROM, Pain, Stiffness, Weakness;   Neurological: NEGATIVE: Dizziness, Confusion, Headache, Seizures, Syncope        Physical  Examination:  Constitutional: Well developed, awake/alert/oriented x3, no distress, alert and cooperative  Eyes: PERRL, EOMI, clear sclera  ENMT: mucous membranes moist, no apparent injury, no lesions seen  Head/Neck: Neck supple, no apparent injury, thyroid without obvoius mass or tenderness, No JVD, trachea midline, no bruits  Respiratory/Thorax: Patent airways, CTAB, normal breath sounds with good chest expansion, thorax symmetric  Cardiovascular: Regular, rate and rhythm, mild systolic murmur, 2+ equal pulses of the extremities, normal S 1and S 2  Gastrointestinal: Nondistended, soft, non-tender, no rebound tenderness or guarding, no masses palpable, no organomegaly, +BS, no bruits  Musculoskeletal: [See above]  Extremities: normal extremities, no cyanosis edema, contusions, no clubbing  Neurological: alert and oriented x3, intact senses, motor, normal strength  Psychological: Appropriate mood and behavior  Skin: Warm and dry, no lesions, no rashes         IMAGE RESULTS:  XR chest 2 views  Narrative: Interpreted By:  Jose Leblanc,   STUDY:  XR CHEST 2 VIEWS;  1/25/2024 6:42 am      INDICATION:  Signs/Symptoms:pre-op.      COMPARISON:  None.      ACCESSION NUMBER(S):  [id number]      ORDERING CLINICIAN:  MADINA  LEB      FINDINGS:  PA and lateral radiographs of the chest were provided.      DEVICES:  None      CARDIOMEDIASTINAL SILHOUETTE:  Cardiomediastinal silhouette is normal in size and configuration.No  significant atherosclerotic calcification.      LUNGS:  No focal consolidation. No pneumothorax. No pleural effusion.      BONES:  No acute osseous changes.      Impression: 1.  No evidence of acute cardiopulmonary process.          Signed by: Jose Leblanc 1/26/2024 9:14 AM  Dictation workstation:   [id number]      ULTRASOUND  None    Procedures     Orders Placed This Encounter    Point of Care Ultrasound    oxyCODONE-acetaminophen (Percocet) 5-325 mg tablet    aspirin 81 mg EC tablet

## 2024-02-20 ENCOUNTER — APPOINTMENT (OUTPATIENT)
Dept: ORTHOPEDIC SURGERY | Facility: CLINIC | Age: 64
End: 2024-02-20
Payer: COMMERCIAL

## 2024-02-25 DIAGNOSIS — M25.552 LEFT HIP PAIN: ICD-10-CM

## 2024-02-26 ENCOUNTER — APPOINTMENT (OUTPATIENT)
Dept: RADIOLOGY | Facility: HOSPITAL | Age: 64
End: 2024-02-26
Payer: COMMERCIAL

## 2024-02-26 ENCOUNTER — HOSPITAL ENCOUNTER (OUTPATIENT)
Facility: HOSPITAL | Age: 64
LOS: 1 days | Discharge: HOME | End: 2024-02-27
Attending: SPECIALIST | Admitting: SPECIALIST
Payer: COMMERCIAL

## 2024-02-26 ENCOUNTER — DOCUMENTATION (OUTPATIENT)
Dept: HOME HEALTH SERVICES | Facility: HOME HEALTH | Age: 64
End: 2024-02-26

## 2024-02-26 ENCOUNTER — ANESTHESIA EVENT (OUTPATIENT)
Dept: OPERATING ROOM | Facility: HOSPITAL | Age: 64
End: 2024-02-26
Payer: COMMERCIAL

## 2024-02-26 ENCOUNTER — HOME HEALTH ADMISSION (OUTPATIENT)
Dept: HOME HEALTH SERVICES | Facility: HOME HEALTH | Age: 64
End: 2024-02-26
Payer: COMMERCIAL

## 2024-02-26 ENCOUNTER — ANESTHESIA (OUTPATIENT)
Dept: OPERATING ROOM | Facility: HOSPITAL | Age: 64
End: 2024-02-26
Payer: COMMERCIAL

## 2024-02-26 DIAGNOSIS — M16.12 OSTEOARTHRITIS OF LEFT HIP, UNSPECIFIED OSTEOARTHRITIS TYPE: Primary | ICD-10-CM

## 2024-02-26 PROCEDURE — 97161 PT EVAL LOW COMPLEX 20 MIN: CPT | Mod: GP

## 2024-02-26 PROCEDURE — 2500000004 HC RX 250 GENERAL PHARMACY W/ HCPCS (ALT 636 FOR OP/ED): Performed by: ANESTHESIOLOGY

## 2024-02-26 PROCEDURE — 2500000005 HC RX 250 GENERAL PHARMACY W/O HCPCS: Performed by: ANESTHESIOLOGY

## 2024-02-26 PROCEDURE — 73502 X-RAY EXAM HIP UNI 2-3 VIEWS: CPT | Mod: LEFT SIDE | Performed by: RADIOLOGY

## 2024-02-26 PROCEDURE — 2720000007 HC OR 272 NO HCPCS: Performed by: SPECIALIST

## 2024-02-26 PROCEDURE — 27130 TOTAL HIP ARTHROPLASTY: CPT | Performed by: SPECIALIST

## 2024-02-26 PROCEDURE — 3700000002 HC GENERAL ANESTHESIA TIME - EACH INCREMENTAL 1 MINUTE: Performed by: SPECIALIST

## 2024-02-26 PROCEDURE — 97110 THERAPEUTIC EXERCISES: CPT | Mod: GP

## 2024-02-26 PROCEDURE — 3600000017 HC OR TIME - EACH INCREMENTAL 1 MINUTE - PROCEDURE LEVEL SIX: Performed by: SPECIALIST

## 2024-02-26 PROCEDURE — 2500000002 HC RX 250 W HCPCS SELF ADMINISTERED DRUGS (ALT 637 FOR MEDICARE OP, ALT 636 FOR OP/ED): Performed by: SPECIALIST

## 2024-02-26 PROCEDURE — 1100000001 HC PRIVATE ROOM DAILY

## 2024-02-26 PROCEDURE — 7100000002 HC RECOVERY ROOM TIME - EACH INCREMENTAL 1 MINUTE: Performed by: SPECIALIST

## 2024-02-26 PROCEDURE — 2780000003 HC OR 278 NO HCPCS: Performed by: SPECIALIST

## 2024-02-26 PROCEDURE — 97605 NEG PRS WND THER DME<=50SQCM: CPT | Performed by: SPECIALIST

## 2024-02-26 PROCEDURE — 3600000018 HC OR TIME - INITIAL BASE CHARGE - PROCEDURE LEVEL SIX: Performed by: SPECIALIST

## 2024-02-26 PROCEDURE — 7100000001 HC RECOVERY ROOM TIME - INITIAL BASE CHARGE: Performed by: SPECIALIST

## 2024-02-26 PROCEDURE — 73501 X-RAY EXAM HIP UNI 1 VIEW: CPT | Mod: LT

## 2024-02-26 PROCEDURE — C1776 JOINT DEVICE (IMPLANTABLE): HCPCS | Performed by: SPECIALIST

## 2024-02-26 PROCEDURE — 2500000001 HC RX 250 WO HCPCS SELF ADMINISTERED DRUGS (ALT 637 FOR MEDICARE OP): Performed by: ANESTHESIOLOGY

## 2024-02-26 PROCEDURE — 2500000004 HC RX 250 GENERAL PHARMACY W/ HCPCS (ALT 636 FOR OP/ED): Performed by: SPECIALIST

## 2024-02-26 PROCEDURE — 3700000001 HC GENERAL ANESTHESIA TIME - INITIAL BASE CHARGE: Performed by: SPECIALIST

## 2024-02-26 PROCEDURE — 97530 THERAPEUTIC ACTIVITIES: CPT | Mod: GP

## 2024-02-26 PROCEDURE — 2500000001 HC RX 250 WO HCPCS SELF ADMINISTERED DRUGS (ALT 637 FOR MEDICARE OP): Performed by: SPECIALIST

## 2024-02-26 PROCEDURE — 2500000005 HC RX 250 GENERAL PHARMACY W/O HCPCS: Performed by: SPECIALIST

## 2024-02-26 PROCEDURE — A4217 STERILE WATER/SALINE, 500 ML: HCPCS | Performed by: SPECIALIST

## 2024-02-26 DEVICE — IMPLANTABLE DEVICE: Type: IMPLANTABLE DEVICE | Site: HIP | Status: FUNCTIONAL

## 2024-02-26 DEVICE — SHELL, TRIDENT II, CLUSTERHOLE, 54E: Type: IMPLANTABLE DEVICE | Site: HIP | Status: FUNCTIONAL

## 2024-02-26 DEVICE — INSERT, MDM X3 RESTORATION, 42OD 28ID X 6.9MM: Type: IMPLANTABLE DEVICE | Site: HIP | Status: FUNCTIONAL

## 2024-02-26 DEVICE — LINER, COCR, MDM, 42MM E: Type: IMPLANTABLE DEVICE | Site: HIP | Status: FUNCTIONAL

## 2024-02-26 RX ORDER — LABETALOL HYDROCHLORIDE 5 MG/ML
5 INJECTION, SOLUTION INTRAVENOUS ONCE AS NEEDED
Status: DISCONTINUED | OUTPATIENT
Start: 2024-02-26 | End: 2024-02-26 | Stop reason: HOSPADM

## 2024-02-26 RX ORDER — SODIUM CHLORIDE, SODIUM LACTATE, POTASSIUM CHLORIDE, CALCIUM CHLORIDE 600; 310; 30; 20 MG/100ML; MG/100ML; MG/100ML; MG/100ML
100 INJECTION, SOLUTION INTRAVENOUS CONTINUOUS
Status: DISCONTINUED | OUTPATIENT
Start: 2024-02-26 | End: 2024-02-27 | Stop reason: HOSPADM

## 2024-02-26 RX ORDER — DEXAMETHASONE SODIUM PHOSPHATE 10 MG/ML
INJECTION INTRAMUSCULAR; INTRAVENOUS AS NEEDED
Status: DISCONTINUED | OUTPATIENT
Start: 2024-02-26 | End: 2024-02-26

## 2024-02-26 RX ORDER — ACETAMINOPHEN 325 MG/1
650 TABLET ORAL ONCE
Status: COMPLETED | OUTPATIENT
Start: 2024-02-26 | End: 2024-02-26

## 2024-02-26 RX ORDER — NALOXONE HYDROCHLORIDE 0.4 MG/ML
0.2 INJECTION, SOLUTION INTRAMUSCULAR; INTRAVENOUS; SUBCUTANEOUS EVERY 5 MIN PRN
Status: DISCONTINUED | OUTPATIENT
Start: 2024-02-26 | End: 2024-02-27 | Stop reason: HOSPADM

## 2024-02-26 RX ORDER — KETOROLAC TROMETHAMINE 30 MG/ML
30 INJECTION, SOLUTION INTRAMUSCULAR; INTRAVENOUS EVERY 6 HOURS
Status: COMPLETED | OUTPATIENT
Start: 2024-02-26 | End: 2024-02-27

## 2024-02-26 RX ORDER — MORPHINE SULFATE 4 MG/ML
2 INJECTION, SOLUTION INTRAMUSCULAR; INTRAVENOUS EVERY 5 MIN PRN
Status: DISCONTINUED | OUTPATIENT
Start: 2024-02-26 | End: 2024-02-26 | Stop reason: HOSPADM

## 2024-02-26 RX ORDER — ROPIVACAINE/EPI/CLONIDINE/KET 2.46-0.005
SYRINGE (ML) INJECTION AS NEEDED
Status: DISCONTINUED | OUTPATIENT
Start: 2024-02-26 | End: 2024-02-26 | Stop reason: HOSPADM

## 2024-02-26 RX ORDER — TRANEXAMIC ACID 650 MG/1
1950 TABLET ORAL 2 TIMES DAILY
Status: COMPLETED | OUTPATIENT
Start: 2024-02-26 | End: 2024-02-27

## 2024-02-26 RX ORDER — MORPHINE SULFATE 4 MG/ML
4 INJECTION, SOLUTION INTRAMUSCULAR; INTRAVENOUS EVERY 5 MIN PRN
Status: DISCONTINUED | OUTPATIENT
Start: 2024-02-26 | End: 2024-02-26 | Stop reason: HOSPADM

## 2024-02-26 RX ORDER — MORPHINE SULFATE 1 MG/ML
INJECTION, SOLUTION EPIDURAL; INTRATHECAL; INTRAVENOUS AS NEEDED
Status: DISCONTINUED | OUTPATIENT
Start: 2024-02-26 | End: 2024-02-26

## 2024-02-26 RX ORDER — ASPIRIN 81 MG/1
81 TABLET ORAL DAILY
Qty: 90 TABLET | Refills: 1 | Status: SHIPPED | OUTPATIENT
Start: 2024-02-26

## 2024-02-26 RX ORDER — TRANEXAMIC ACID 650 MG/1
1950 TABLET ORAL ONCE
Status: COMPLETED | OUTPATIENT
Start: 2024-02-26 | End: 2024-02-26

## 2024-02-26 RX ORDER — VANCOMYCIN HYDROCHLORIDE 1 G/20ML
1 INJECTION, POWDER, LYOPHILIZED, FOR SOLUTION INTRAVENOUS ONCE
Status: COMPLETED | OUTPATIENT
Start: 2024-02-26 | End: 2024-02-26

## 2024-02-26 RX ORDER — HYDROCHLOROTHIAZIDE 25 MG/1
25 TABLET ORAL DAILY
Status: DISCONTINUED | OUTPATIENT
Start: 2024-02-26 | End: 2024-02-27 | Stop reason: HOSPADM

## 2024-02-26 RX ORDER — BUPIVACAINE HYDROCHLORIDE 7.5 MG/ML
INJECTION, SOLUTION EPIDURAL; RETROBULBAR AS NEEDED
Status: DISCONTINUED | OUTPATIENT
Start: 2024-02-26 | End: 2024-02-26

## 2024-02-26 RX ORDER — ONDANSETRON HYDROCHLORIDE 2 MG/ML
INJECTION, SOLUTION INTRAVENOUS AS NEEDED
Status: DISCONTINUED | OUTPATIENT
Start: 2024-02-26 | End: 2024-02-26

## 2024-02-26 RX ORDER — OXYCODONE HYDROCHLORIDE 5 MG/1
5 TABLET ORAL EVERY 4 HOURS PRN
Status: DISCONTINUED | OUTPATIENT
Start: 2024-02-26 | End: 2024-02-26 | Stop reason: HOSPADM

## 2024-02-26 RX ORDER — ONDANSETRON HYDROCHLORIDE 2 MG/ML
4 INJECTION, SOLUTION INTRAVENOUS ONCE AS NEEDED
Status: COMPLETED | OUTPATIENT
Start: 2024-02-26 | End: 2024-02-26

## 2024-02-26 RX ORDER — ASPIRIN 325 MG
325 TABLET, DELAYED RELEASE (ENTERIC COATED) ORAL 2 TIMES DAILY
Status: DISCONTINUED | OUTPATIENT
Start: 2024-02-26 | End: 2024-02-27 | Stop reason: HOSPADM

## 2024-02-26 RX ORDER — CEFAZOLIN SODIUM 2 G/100ML
2 INJECTION, SOLUTION INTRAVENOUS ONCE
Status: DISCONTINUED | OUTPATIENT
Start: 2024-02-26 | End: 2024-02-26

## 2024-02-26 RX ORDER — ASPIRIN 81 MG/1
81 TABLET ORAL DAILY
Status: DISCONTINUED | OUTPATIENT
Start: 2024-02-26 | End: 2024-02-26

## 2024-02-26 RX ORDER — BACLOFEN 10 MG/1
10 TABLET ORAL 2 TIMES DAILY
Status: DISCONTINUED | OUTPATIENT
Start: 2024-02-26 | End: 2024-02-27 | Stop reason: HOSPADM

## 2024-02-26 RX ORDER — AMLODIPINE BESYLATE 5 MG/1
5 TABLET ORAL DAILY
Status: DISCONTINUED | OUTPATIENT
Start: 2024-02-26 | End: 2024-02-27 | Stop reason: HOSPADM

## 2024-02-26 RX ORDER — CHLORTHALIDONE 25 MG/1
25 TABLET ORAL DAILY
Status: DISCONTINUED | OUTPATIENT
Start: 2024-02-26 | End: 2024-02-26

## 2024-02-26 RX ORDER — KETOROLAC TROMETHAMINE 30 MG/ML
INJECTION, SOLUTION INTRAMUSCULAR; INTRAVENOUS AS NEEDED
Status: DISCONTINUED | OUTPATIENT
Start: 2024-02-26 | End: 2024-02-26

## 2024-02-26 RX ORDER — MIDAZOLAM HYDROCHLORIDE 1 MG/ML
INJECTION INTRAMUSCULAR; INTRAVENOUS AS NEEDED
Status: DISCONTINUED | OUTPATIENT
Start: 2024-02-26 | End: 2024-02-26

## 2024-02-26 RX ORDER — GABAPENTIN 300 MG/1
600 CAPSULE ORAL ONCE
Status: COMPLETED | OUTPATIENT
Start: 2024-02-26 | End: 2024-02-26

## 2024-02-26 RX ORDER — PROPOFOL 10 MG/ML
INJECTION, EMULSION INTRAVENOUS CONTINUOUS PRN
Status: DISCONTINUED | OUTPATIENT
Start: 2024-02-26 | End: 2024-02-26

## 2024-02-26 RX ORDER — CARVEDILOL 12.5 MG/1
12.5 TABLET ORAL
Status: DISCONTINUED | OUTPATIENT
Start: 2024-02-26 | End: 2024-02-27 | Stop reason: HOSPADM

## 2024-02-26 RX ADMIN — GABAPENTIN 600 MG: 300 CAPSULE ORAL at 07:16

## 2024-02-26 RX ADMIN — ONDANSETRON 4 MG: 2 INJECTION INTRAMUSCULAR; INTRAVENOUS at 12:03

## 2024-02-26 RX ADMIN — ASPIRIN 325 MG: 325 TABLET, COATED ORAL at 20:49

## 2024-02-26 RX ADMIN — CARVEDILOL 12.5 MG: 12.5 TABLET, FILM COATED ORAL at 18:16

## 2024-02-26 RX ADMIN — AMLODIPINE BESYLATE 5 MG: 5 TABLET ORAL at 18:16

## 2024-02-26 RX ADMIN — KETOROLAC TROMETHAMINE 30 MG: 30 INJECTION, SOLUTION INTRAMUSCULAR; INTRAVENOUS at 10:05

## 2024-02-26 RX ADMIN — BUPIVACAINE HYDROCHLORIDE 1.8 ML: 7.5 INJECTION, SOLUTION EPIDURAL; RETROBULBAR at 07:48

## 2024-02-26 RX ADMIN — TRANEXAMIC ACID 1950 MG: 650 TABLET ORAL at 15:00

## 2024-02-26 RX ADMIN — VANCOMYCIN HYDROCHLORIDE 1500 MG: 1.5 INJECTION, POWDER, LYOPHILIZED, FOR SOLUTION INTRAVENOUS at 19:20

## 2024-02-26 RX ADMIN — TRANEXAMIC ACID 1950 MG: 650 TABLET ORAL at 10:50

## 2024-02-26 RX ADMIN — DEXAMETHASONE SODIUM PHOSPHATE 8 MG: 10 INJECTION INTRAMUSCULAR; INTRAVENOUS at 08:10

## 2024-02-26 RX ADMIN — MORPHINE SULFATE 0.1 MG: 1 INJECTION EPIDURAL; INTRATHECAL; INTRAVENOUS at 07:48

## 2024-02-26 RX ADMIN — VANCOMYCIN HYDROCHLORIDE 1 G: 1025 INJECTION, POWDER, FOR SOLUTION INTRAVENOUS; ORAL at 07:30

## 2024-02-26 RX ADMIN — PROPOFOL 70 MCG/KG/MIN: 10 INJECTION, EMULSION INTRAVENOUS at 07:57

## 2024-02-26 RX ADMIN — ACETAMINOPHEN 650 MG: 325 TABLET ORAL at 10:52

## 2024-02-26 RX ADMIN — Medication 20 MG: at 07:53

## 2024-02-26 RX ADMIN — ONDANSETRON 4 MG: 2 INJECTION INTRAMUSCULAR; INTRAVENOUS at 08:10

## 2024-02-26 RX ADMIN — Medication 10 MG: at 09:47

## 2024-02-26 RX ADMIN — PROMETHAZINE HYDROCHLORIDE 6.25 MG: 25 INJECTION INTRAMUSCULAR; INTRAVENOUS at 12:17

## 2024-02-26 RX ADMIN — BACLOFEN 10 MG: 10 TABLET ORAL at 20:49

## 2024-02-26 RX ADMIN — Medication 10 MG: at 09:22

## 2024-02-26 RX ADMIN — TRANEXAMIC ACID 1950 MG: 650 TABLET ORAL at 15:51

## 2024-02-26 RX ADMIN — MIDAZOLAM HYDROCHLORIDE 1 MG: 1 INJECTION, SOLUTION INTRAMUSCULAR; INTRAVENOUS at 07:53

## 2024-02-26 RX ADMIN — HYDROCHLOROTHIAZIDE 25 MG: 25 TABLET ORAL at 18:16

## 2024-02-26 RX ADMIN — KETOROLAC TROMETHAMINE 30 MG: 30 INJECTION, SOLUTION INTRAMUSCULAR at 18:16

## 2024-02-26 RX ADMIN — SODIUM CHLORIDE, POTASSIUM CHLORIDE, SODIUM LACTATE AND CALCIUM CHLORIDE 100 ML/HR: 600; 310; 30; 20 INJECTION, SOLUTION INTRAVENOUS at 14:17

## 2024-02-26 RX ADMIN — PSYLLIUM HUSK 1 PACKET: 3.4 POWDER ORAL at 20:49

## 2024-02-26 RX ADMIN — MIDAZOLAM HYDROCHLORIDE 3 MG: 1 INJECTION, SOLUTION INTRAMUSCULAR; INTRAVENOUS at 07:48

## 2024-02-26 RX ADMIN — Medication 10 MG: at 09:00

## 2024-02-26 SDOH — SOCIAL STABILITY: SOCIAL INSECURITY: ABUSE: ADULT

## 2024-02-26 SDOH — SOCIAL STABILITY: SOCIAL INSECURITY: DOES ANYONE TRY TO KEEP YOU FROM HAVING/CONTACTING OTHER FRIENDS OR DOING THINGS OUTSIDE YOUR HOME?: NO

## 2024-02-26 SDOH — SOCIAL STABILITY: SOCIAL INSECURITY: DO YOU FEEL UNSAFE GOING BACK TO THE PLACE WHERE YOU ARE LIVING?: NO

## 2024-02-26 SDOH — SOCIAL STABILITY: SOCIAL INSECURITY: HAS ANYONE EVER THREATENED TO HURT YOUR FAMILY OR YOUR PETS?: NO

## 2024-02-26 SDOH — SOCIAL STABILITY: SOCIAL INSECURITY: ARE THERE ANY APPARENT SIGNS OF INJURIES/BEHAVIORS THAT COULD BE RELATED TO ABUSE/NEGLECT?: NO

## 2024-02-26 SDOH — SOCIAL STABILITY: SOCIAL INSECURITY: HAVE YOU HAD THOUGHTS OF HARMING ANYONE ELSE?: NO

## 2024-02-26 SDOH — SOCIAL STABILITY: SOCIAL INSECURITY: DO YOU FEEL ANYONE HAS EXPLOITED OR TAKEN ADVANTAGE OF YOU FINANCIALLY OR OF YOUR PERSONAL PROPERTY?: NO

## 2024-02-26 SDOH — SOCIAL STABILITY: SOCIAL INSECURITY: WERE YOU ABLE TO COMPLETE ALL THE BEHAVIORAL HEALTH SCREENINGS?: YES

## 2024-02-26 SDOH — SOCIAL STABILITY: SOCIAL INSECURITY: ARE YOU OR HAVE YOU BEEN THREATENED OR ABUSED PHYSICALLY, EMOTIONALLY, OR SEXUALLY BY ANYONE?: NO

## 2024-02-26 ASSESSMENT — COGNITIVE AND FUNCTIONAL STATUS - GENERAL
MOBILITY SCORE: 20
WALKING IN HOSPITAL ROOM: A LITTLE
STANDING UP FROM CHAIR USING ARMS: A LITTLE
CLIMB 3 TO 5 STEPS WITH RAILING: A LOT
DAILY ACTIVITIY SCORE: 24
WALKING IN HOSPITAL ROOM: A LITTLE
CLIMB 3 TO 5 STEPS WITH RAILING: A LITTLE
WALKING IN HOSPITAL ROOM: A LITTLE
STANDING UP FROM CHAIR USING ARMS: A LITTLE
TURNING FROM BACK TO SIDE WHILE IN FLAT BAD: A LITTLE
CLIMB 3 TO 5 STEPS WITH RAILING: A LOT
MOVING TO AND FROM BED TO CHAIR: A LITTLE
MOBILITY SCORE: 18
MOVING TO AND FROM BED TO CHAIR: A LITTLE
STANDING UP FROM CHAIR USING ARMS: A LITTLE
MOBILITY SCORE: 19
MOVING TO AND FROM BED TO CHAIR: A LITTLE
PATIENT BASELINE BEDBOUND: NO

## 2024-02-26 ASSESSMENT — ACTIVITIES OF DAILY LIVING (ADL)
FEEDING YOURSELF: INDEPENDENT
BATHING: INDEPENDENT
PATIENT'S MEMORY ADEQUATE TO SAFELY COMPLETE DAILY ACTIVITIES?: YES
HEARING - RIGHT EAR: FUNCTIONAL
GROOMING: INDEPENDENT
WALKS IN HOME: INDEPENDENT
JUDGMENT_ADEQUATE_SAFELY_COMPLETE_DAILY_ACTIVITIES: YES
HEARING - LEFT EAR: FUNCTIONAL
ADL_ASSISTANCE: INDEPENDENT
ADEQUATE_TO_COMPLETE_ADL: YES
LACK_OF_TRANSPORTATION: NO
DRESSING YOURSELF: INDEPENDENT
TOILETING: INDEPENDENT

## 2024-02-26 ASSESSMENT — PAIN SCALES - GENERAL

## 2024-02-26 ASSESSMENT — PAIN - FUNCTIONAL ASSESSMENT
PAIN_FUNCTIONAL_ASSESSMENT: 0-10

## 2024-02-26 ASSESSMENT — LIFESTYLE VARIABLES
AUDIT-C TOTAL SCORE: 0
HOW MANY STANDARD DRINKS CONTAINING ALCOHOL DO YOU HAVE ON A TYPICAL DAY: PATIENT DOES NOT DRINK
HOW OFTEN DO YOU HAVE A DRINK CONTAINING ALCOHOL: NEVER
SUBSTANCE_ABUSE_PAST_12_MONTHS: NO
AUDIT-C TOTAL SCORE: 0
HOW OFTEN DO YOU HAVE 6 OR MORE DRINKS ON ONE OCCASION: NEVER
SKIP TO QUESTIONS 9-10: 1
PRESCIPTION_ABUSE_PAST_12_MONTHS: NO

## 2024-02-26 ASSESSMENT — PATIENT HEALTH QUESTIONNAIRE - PHQ9
1. LITTLE INTEREST OR PLEASURE IN DOING THINGS: NOT AT ALL
2. FEELING DOWN, DEPRESSED OR HOPELESS: NOT AT ALL
SUM OF ALL RESPONSES TO PHQ9 QUESTIONS 1 & 2: 0

## 2024-02-26 ASSESSMENT — COLUMBIA-SUICIDE SEVERITY RATING SCALE - C-SSRS
1. IN THE PAST MONTH, HAVE YOU WISHED YOU WERE DEAD OR WISHED YOU COULD GO TO SLEEP AND NOT WAKE UP?: NO
2. HAVE YOU ACTUALLY HAD ANY THOUGHTS OF KILLING YOURSELF?: NO
6. HAVE YOU EVER DONE ANYTHING, STARTED TO DO ANYTHING, OR PREPARED TO DO ANYTHING TO END YOUR LIFE?: NO
6. HAVE YOU EVER DONE ANYTHING, STARTED TO DO ANYTHING, OR PREPARED TO DO ANYTHING TO END YOUR LIFE?: NO
2. HAVE YOU ACTUALLY HAD ANY THOUGHTS OF KILLING YOURSELF?: NO
1. IN THE PAST MONTH, HAVE YOU WISHED YOU WERE DEAD OR WISHED YOU COULD GO TO SLEEP AND NOT WAKE UP?: NO

## 2024-02-26 NOTE — ANESTHESIA PREPROCEDURE EVALUATION
Patient: Jesus Rand    Procedure Information       Date/Time: 02/26/24 0730    Procedure: Arthroplasty Total Hip Posterior Approach (Left: Hip) - JOINT REPLACEMENT CLASS JANUARY 25, 24  ANESTHESIA CLASS JANUARY 26, 24    Location: [facility] OR  / [facility] OR    Surgeons: Alexis Stewart MD            Relevant Problems   Musculoskeletal   (+) Osteoarthritis of left hip, unspecified osteoarthritis type      Other   (+) Arthritis of left hip       Clinical information reviewed:   Tobacco  Allergies  Meds   Med Hx  Surg Hx   Fam Hx  Soc Hx        NPO/Void Status  Carbohydrate Drink Given Prior to Surgery? : Y  Date of Last Liquid: 02/25/24  Time of Last Liquid: 2100  Date of Last Solid: 02/25/24  Time of Last Solid: 2100  Last Intake Type: Clear fluids  Time of Last Void: 0628         Physical Exam    Airway  Mallampati: II  TM distance: >3 FB  Neck ROM: full     Cardiovascular   Rhythm: regular  Rate: normal     Dental    Pulmonary    Abdominal        Anesthesia Plan    History of general anesthesia?: yes  History of complications of general anesthesia?: no    ASA 2     spinal     intravenous induction   Anesthetic plan and risks discussed with patient.     Anesthesia Evaluation      Airway   Mallampati: II  TM distance: >3 FB  Neck ROM: full  Dental      Pulmonary - negative ROS   Cardiovascular   (+) hypertension    Rhythm: regular  Rate: normal    Neuro/Psych - negative ROS     GI/Hepatic/Renal - negative ROS     Endo/Other - negative ROS   Abdominal

## 2024-02-26 NOTE — PROGRESS NOTES
Physical Therapy    Physical Therapy Evaluation & Treatment    Patient Name: Jesus Rand  MRN: [id number]  Today's Date: 2/26/2024   Time Calculation  Start Time: 1631  Stop Time: 1722  Time Calculation (min): 51 min    Assessment/Plan   PT Assessment  PT Assessment Results: Decreased strength, Decreased range of motion, Decreased endurance, Impaired balance, Decreased mobility, Impaired judgement, Decreased safety awareness, Orthopedic restrictions, Pain  Rehab Prognosis: Good  End of Session Communication: Bedside nurse  Assessment Comment: Pt presents with decreased functional mobility following L HALEY. He demos weakness, decreased balance, decreased range of motion, and decreased safety awareness.  End of Session Patient Position: Up in chair, Alarm off, caregiver present  IP OR SWING BED PT PLAN  Inpatient or Swing Bed: Inpatient  PT Plan  Treatment/Interventions: Bed mobility, Transfer training, Gait training, Balance training, Neuromuscular re-education, Strengthening, Endurance training, Range of motion, Therapeutic exercise, Therapeutic activity, Home exercise program  PT Plan: Skilled PT  PT Frequency: BID  PT Discharge Recommendations: Low intensity level of continued care  PT Recommended Transfer Status: Assist x1, Assistive device  PT - OK to Discharge: Yes (PT eval complete, ok to d/c once deemed medically appropriate.)    Current Problem:  Patient Active Problem List   Diagnosis    Arthritis of left hip    Osteoarthritis of left hip, unspecified osteoarthritis type       Subjective     General Visit Information:  General  Reason for Referral: s/p L HALEY  Referred By: Pat  Past Medical History Relevant to Rehab: HTN  Family/Caregiver Present: Yes (Dtr present)  Patient Position Received: Bed, 3 rail up, Alarm off, not on at start of session  General Comment: Pt s/p elective L HALEY by Dr. Stewart 2/26/24.    Home Living:  Home Living  Type of Home: House  Lives With:  (17 y/o son)  Home Adaptive Equipment:  Walker rolling or standard, Cane  Home Layout: One level  Home Access: Ramped entrance  Bathroom Shower/Tub: Walk-in shower  Bathroom Toilet: Handicapped height  Bathroom Equipment: Shower chair with back, Grab bars in shower    Prior Level of Function:  Prior Function Per Pt/Caregiver Report  Level of [place]: Independent with ADLs and functional transfers, Independent with homemaking with ambulation  ADL Assistance: Independent  Homemaking Assistance: Independent  Ambulatory Assistance: Independent  Prior Function Comments: Works full time as a . Drives. Denies falls in last 3 months.    Precautions:  Precautions  LE Weight Bearing Status: Weight Bearing as Tolerated (L LE)  Post-Surgical Precautions: Left hip precautions (POSTERIOR)  Precautions Comment: Pt with bleeding leaking from dressing site; RN aware and addressing following PT assessment.    Vital Signs:     Objective     Pain:  Pain Assessment  Pain Assessment: 0-10  Pain Score: 0 - No pain    Cognition:  Cognition  Overall Cognitive Status: Within Functional Limits  Orientation Level: Oriented X4    General Assessments:      Activity Tolerance  Endurance: Endurance does not limit participation in activity  Sensation  Light Touch: No apparent deficits  Strength  Strength Comments: R LE MMT 5/5, L hip NT recent surgery, knee ext 3/5, ankle DF 4/5        Postural Control  Postural Control: Within Functional Limits  Static Sitting Balance  Static Sitting-Comment/Number of Minutes: Good  Dynamic Sitting Balance  Dynamic Sitting-Comments: Good  Static Standing Balance  Static Standing-Comment/Number of Minutes: Fair  Dynamic Standing Balance  Dynamic Standing-Comments: Fair +    Functional Assessments:     Bed Mobility  Bed Mobility: Yes  Bed Mobility 1  Bed Mobility 1: Supine to sitting  Level of Assistance 1: Minimum assistance  Bed Mobility Comments 1: Assist for bringing BLE's over EOB, HOB elevated, cues for hand placement with  ww  Transfers  Transfer: Yes  Transfer 1  Transfer From 1: Bed to, Toilet to  Transfer to 1: Chair with arms, Toilet  Transfer Device 1: Walker  Transfer Level of Assistance 1: Minimum assistance  Trials/Comments 1: Cues for hand placement with ww, cues for sliding L LE forward to maintain precautions  Ambulation/Gait Training  Ambulation/Gait Training Performed: Yes  Ambulation/Gait Training 1  Surface 1: Level tile  Device 1: Rolling walker  Assistance 1: Minimum assistance  Quality of Gait 1: Inconsistent stride length, Antalgic  Comments/Distance (ft) 1: Pt ambulates ~20' in room, increased UE support on ww, cues for ww management, min A for balance          Extremity/Trunk Assessments:        RLE   RLE : Within Functional Limits  LLE   LLE : Within Functional Limits    Treatments:  Therapeutic Exercise  Therapeutic Exercise Performed: Yes  Therapeutic Exercise Activity 1: ankle pumps x20  Therapeutic Exercise Activity 2: quad set x20  Therapeutic Exercise Activity 3: glute set x20  Therapeutic Exercise Activity 4: heel slide x20  Therapeutic Exercise Activity 5: SAQ x20  Therapeutic Exercise Activity 6: hip abduction slide x20        Bed Mobility  Bed Mobility: Yes  Bed Mobility 1  Bed Mobility 1: Supine to sitting  Level of Assistance 1: Minimum assistance  Bed Mobility Comments 1: Assist for bringing BLE's over EOB, HOB elevated, cues for hand placement with ww  Ambulation/Gait Training  Ambulation/Gait Training Performed: Yes  Ambulation/Gait Training 1  Surface 1: Level tile  Device 1: Rolling walker  Assistance 1: Minimum assistance  Quality of Gait 1: Inconsistent stride length, Antalgic  Comments/Distance (ft) 1: Pt ambulates ~20' in room, increased UE support on ww, cues for ww management, min A for balance  Transfers  Transfer: Yes  Transfer 1  Transfer From 1: Bed to, Toilet to  Transfer to 1: Chair with arms, Toilet  Transfer Device 1: Walker  Transfer Level of Assistance 1: Minimum  assistance  Trials/Comments 1: Cues for hand placement with ww, cues for sliding L LE forward to maintain precautions       Outcome Measures:  [facility] Basic Mobility  Turning from your back to your side while in a flat bed without using bedrails: None  Moving from lying on your back to sitting on the side of a flat bed without using bedrails: A little  Moving to and from bed to chair (including a wheelchair): A little  Standing up from a chair using your arms (e.g. wheelchair or bedside chair): A little  To walk in hospital room: A little  Climbing 3-5 steps with railing: A lot  Basic Mobility - Total Score: 18                            Goals:  Encounter Problems       Encounter Problems (Active)       PT Problem       Pt will demonstrate sup > sit and sit > sup bed mobility mod I (Progressing)       Start:  02/26/24    Expected End:  03/11/24            Pt will demo sit > stand and stand > sit transfer with ww and mod I  (Progressing)       Start:  02/26/24    Expected End:  03/11/24            Pt will ambulate 150' with ww and mod I, without LOB  (Progressing)       Start:  02/26/24    Expected End:  03/11/24            Pt will verbalize/demonstrate understanding of posterior hip precautions (Progressing)       Start:  02/26/24    Expected End:  03/11/24            Pt will demo up/down 1 step with appropriate sequencing of surgical extremity, with HR, mod I (Progressing)       Start:  02/26/24    Expected End:  03/11/24                 Education Documentation  Handouts, taught by Jannette Ingram PT at 2/26/2024  5:31 PM.  Learner: Family, Patient  Readiness: Acceptance  Method: Explanation  Response: Needs Reinforcement    Precautions, taught by Jannette Ingram PT at 2/26/2024  5:31 PM.  Learner: Family, Patient  Readiness: Acceptance  Method: Explanation  Response: Needs Reinforcement    Mobility Training, taught by Jannette Ingram PT at 2/26/2024  5:31 PM.  Learner: Family, Patient  Readiness:  Acceptance  Method: Explanation  Response: Needs Reinforcement    Education Comments  No comments found.

## 2024-02-26 NOTE — HH CARE COORDINATION
Home Care received a Referral for Physical Therapy and Occupational Therapy. We have processed the referral for a Start of Care on 02/28/2024.     If you have any questions or concerns, please feel free to contact us at [phone]. Follow the prompts, enter your five digit zip code, and you will be directed to your care team on WEST 1.

## 2024-02-26 NOTE — ANESTHESIA PROCEDURE NOTES
Spinal Block    Reason for block: primary anesthetic  Staffing  Performed: attending   Authorized by: Vj Wood MD    Performed by: Vj Wood MD    Preanesthetic Checklist  Completed: patient identified, IV checked, site marked, risks and benefits discussed, surgical consent, monitors and equipment checked, pre-op evaluation, timeout performed and sterile techniques followed  Block Timeout  RN/Licensed healthcare professional reads aloud to the Anesthesia provider and entire team: Patient identity, procedure with side and site, patient position, and as applicable the availability of implants/special equipment/special requirements.  Patient on coagulant treatment: no  Timeout performed at:   Spinal Block  Patient position: sitting  Prep: ChloraPrep  Sterility prep: cap, drape, gloves, hand hygiene and mask  Sedation level: moderate sedation  Patient monitoring: blood pressure and continuous pulse oximetry  Approach: midline  Vertebral space: L3-4  Injection technique: single-shot  Needle  Needle type: Lee   Needle gauge: 25 G  Free flowing CSF: yes    Assessment  Procedure assessment: patient sedated but conversant throughout procedure

## 2024-02-26 NOTE — ANESTHESIA POSTPROCEDURE EVALUATION
Patient: Jesus Rand    Procedure Summary       Date: 02/26/24 Room / Location: [facility] OR 03 / [facility] OR    Anesthesia Start: 0742 Anesthesia Stop: 1035    Procedure: Arthroplasty Total Hip Posterior Approach (Left: Hip) Diagnosis:       Osteoarthritis of left hip, unspecified osteoarthritis type      (Osteoarthritis of left hip, unspecified osteoarthritis type [M16.12])    Surgeons: Alexis Stewart MD Responsible Provider: Vj Wood MD    Anesthesia Type: spinal ASA Status: 2            Anesthesia Type: spinal    Vitals Value Taken Time   Vitals:    02/26/24 0625   BP: 127/87   Pulse: 74   Resp: 15   Temp: 36.3 °C (97.4 °F)   SpO2: 96%       02/26/24 1112     02/26/24 1112     02/26/24 1112     02/26/24 1112     02/26/24 1112       Anesthesia Post Evaluation    Patient location during evaluation: bedside  Patient participation: complete - patient participated  Level of consciousness: awake  Pain management: adequate  Multimodal analgesia pain management approach  Airway patency: patent  Cardiovascular status: acceptable  Respiratory status: acceptable  Hydration status: acceptable  Postoperative Nausea and Vomiting: none      No notable events documented.

## 2024-02-26 NOTE — HH CARE COORDINATION
Home Care received a Referral for Physical Therapy and Occupational Therapy. We have processed the referral for a Start of Care on 02/27/2024.     If you have any questions or concerns, please feel free to contact us at [phone]. Follow the prompts, enter your five digit zip code, and you will be directed to your care team on WEST 1.

## 2024-02-27 VITALS
OXYGEN SATURATION: 96 % | SYSTOLIC BLOOD PRESSURE: 137 MMHG | DIASTOLIC BLOOD PRESSURE: 76 MMHG | RESPIRATION RATE: 18 BRPM | TEMPERATURE: 96.8 F | BODY MASS INDEX: 32.26 KG/M2 | HEIGHT: 71 IN | HEART RATE: 63 BPM | WEIGHT: 230.4 LBS

## 2024-02-27 PROBLEM — M16.12 PRIMARY OSTEOARTHRITIS OF LEFT HIP: Status: ACTIVE | Noted: 2024-02-27

## 2024-02-27 LAB
ERYTHROCYTE [DISTWIDTH] IN BLOOD BY AUTOMATED COUNT: 12.3 % (ref 11.5–14.5)
HCT VFR BLD AUTO: 31.2 % (ref 41–52)
HGB BLD-MCNC: 11 G/DL (ref 13.5–17.5)
HOLD SPECIMEN: NORMAL
MCH RBC QN AUTO: 31.9 PG (ref 26–34)
MCHC RBC AUTO-ENTMCNC: 35.3 G/DL (ref 32–36)
MCV RBC AUTO: 90 FL (ref 80–100)
NRBC BLD-RTO: 0 /100 WBCS (ref 0–0)
PLATELET # BLD AUTO: 236 X10*3/UL (ref 150–450)
RBC # BLD AUTO: 3.45 X10*6/UL (ref 4.5–5.9)
WBC # BLD AUTO: 17.7 X10*3/UL (ref 4.4–11.3)

## 2024-02-27 PROCEDURE — 7100000011 HC EXTENDED STAY RECOVERY HOURLY - NURSING UNIT

## 2024-02-27 PROCEDURE — 2500000004 HC RX 250 GENERAL PHARMACY W/ HCPCS (ALT 636 FOR OP/ED): Performed by: SPECIALIST

## 2024-02-27 PROCEDURE — 9420000001 HC RT PATIENT EDUCATION 5 MIN

## 2024-02-27 PROCEDURE — 94664 DEMO&/EVAL PT USE INHALER: CPT

## 2024-02-27 PROCEDURE — 97116 GAIT TRAINING THERAPY: CPT | Mod: GP | Performed by: PHYSICAL THERAPIST

## 2024-02-27 PROCEDURE — 36415 COLL VENOUS BLD VENIPUNCTURE: CPT | Performed by: SPECIALIST

## 2024-02-27 PROCEDURE — 2500000002 HC RX 250 W HCPCS SELF ADMINISTERED DRUGS (ALT 637 FOR MEDICARE OP, ALT 636 FOR OP/ED): Performed by: SPECIALIST

## 2024-02-27 PROCEDURE — 2500000001 HC RX 250 WO HCPCS SELF ADMINISTERED DRUGS (ALT 637 FOR MEDICARE OP): Performed by: SPECIALIST

## 2024-02-27 PROCEDURE — 94761 N-INVAS EAR/PLS OXIMETRY MLT: CPT

## 2024-02-27 PROCEDURE — 97110 THERAPEUTIC EXERCISES: CPT | Mod: GP | Performed by: PHYSICAL THERAPIST

## 2024-02-27 PROCEDURE — 94640 AIRWAY INHALATION TREATMENT: CPT

## 2024-02-27 PROCEDURE — 97165 OT EVAL LOW COMPLEX 30 MIN: CPT | Mod: GO

## 2024-02-27 PROCEDURE — 97535 SELF CARE MNGMENT TRAINING: CPT | Mod: GO

## 2024-02-27 PROCEDURE — 85027 COMPLETE CBC AUTOMATED: CPT | Performed by: SPECIALIST

## 2024-02-27 RX ADMIN — KETOROLAC TROMETHAMINE 30 MG: 30 INJECTION, SOLUTION INTRAMUSCULAR at 06:17

## 2024-02-27 RX ADMIN — HYDROCHLOROTHIAZIDE 25 MG: 25 TABLET ORAL at 08:06

## 2024-02-27 RX ADMIN — BACLOFEN 10 MG: 10 TABLET ORAL at 08:06

## 2024-02-27 RX ADMIN — VANCOMYCIN HYDROCHLORIDE 1500 MG: 1.5 INJECTION, POWDER, LYOPHILIZED, FOR SOLUTION INTRAVENOUS at 06:18

## 2024-02-27 RX ADMIN — KETOROLAC TROMETHAMINE 30 MG: 30 INJECTION, SOLUTION INTRAMUSCULAR at 10:18

## 2024-02-27 RX ADMIN — PSYLLIUM HUSK 1 PACKET: 3.4 POWDER ORAL at 08:07

## 2024-02-27 RX ADMIN — ASPIRIN 325 MG: 325 TABLET, COATED ORAL at 08:06

## 2024-02-27 RX ADMIN — CARVEDILOL 12.5 MG: 12.5 TABLET, FILM COATED ORAL at 08:06

## 2024-02-27 RX ADMIN — KETOROLAC TROMETHAMINE 30 MG: 30 INJECTION, SOLUTION INTRAMUSCULAR at 00:00

## 2024-02-27 RX ADMIN — AMLODIPINE BESYLATE 5 MG: 5 TABLET ORAL at 08:06

## 2024-02-27 RX ADMIN — TRANEXAMIC ACID 1950 MG: 650 TABLET ORAL at 06:17

## 2024-02-27 ASSESSMENT — COGNITIVE AND FUNCTIONAL STATUS - GENERAL
MOBILITY SCORE: 19
MOVING TO AND FROM BED TO CHAIR: A LITTLE
WALKING IN HOSPITAL ROOM: A LITTLE
CLIMB 3 TO 5 STEPS WITH RAILING: A LITTLE
TOILETING: A LITTLE
DRESSING REGULAR LOWER BODY CLOTHING: A LITTLE
MOBILITY SCORE: 22
WALKING IN HOSPITAL ROOM: A LITTLE
CLIMB 3 TO 5 STEPS WITH RAILING: A LITTLE
HELP NEEDED FOR BATHING: A LITTLE
DRESSING REGULAR LOWER BODY CLOTHING: A LITTLE
TOILETING: A LITTLE
DAILY ACTIVITIY SCORE: 21
MOBILITY SCORE: 22
CLIMB 3 TO 5 STEPS WITH RAILING: A LOT
DAILY ACTIVITIY SCORE: 21
HELP NEEDED FOR BATHING: A LITTLE
WALKING IN HOSPITAL ROOM: A LITTLE
STANDING UP FROM CHAIR USING ARMS: A LITTLE

## 2024-02-27 ASSESSMENT — PAIN - FUNCTIONAL ASSESSMENT
PAIN_FUNCTIONAL_ASSESSMENT: 0-10

## 2024-02-27 ASSESSMENT — PAIN SCALES - GENERAL
PAINLEVEL_OUTOF10: 0 - NO PAIN
PAINLEVEL_OUTOF10: 3
PAINLEVEL_OUTOF10: 0 - NO PAIN
PAINLEVEL_OUTOF10: 0 - NO PAIN

## 2024-02-27 ASSESSMENT — ACTIVITIES OF DAILY LIVING (ADL)
LACK_OF_TRANSPORTATION: NO
BATHING_ASSISTANCE: STAND BY
ADL_ASSISTANCE: INDEPENDENT
HOME_MANAGEMENT_TIME_ENTRY: 23

## 2024-02-27 NOTE — PROGRESS NOTES
Occupational Therapy    Evaluation and Treatment    Patient Name: Jesus Rand  MRN: [id number]  Today's Date: 2/27/2024  Time Calculation  Start Time: 0722  Stop Time: 0800  Time Calculation (min): 38 min        Assessment:  OT Assessment: pt s/p elective HALEY.  pt requires skilled OT services to ensure hip precautions during ADL and IADL  Prognosis: Good  End of Session Communication: Bedside nurse  End of Session Patient Position: Up in chair, Alarm off, caregiver present  OT Assessment Results: Decreased ADL status, Decreased functional mobility  Prognosis: Good  Plan:  Treatment Interventions: ADL retraining, Functional transfer training  OT Frequency: 2 times per week  Equipment Recommended upon Discharge: Wheeled walker  OT Recommended Transfer Status: Stand by assist  OT - OK to Discharge: Yes (ok to DC once medically stable)  Treatment Interventions: ADL retraining, Functional transfer training    Subjective   Current Problem:  1. Osteoarthritis of left hip, unspecified osteoarthritis type  Referral to Home Health        General:  General  Reason for Referral: s/p L HALEY  Referred By: Pat  Past Medical History Relevant to Rehab: HTN  Family/Caregiver Present: No  Prior to Session Communication: Bedside nurse  Patient Position Received: Bed, 3 rail up, Alarm off, not on at start of session  General Comment: Pt s/p elective L HALEY by Dr. Stewart 2/26/24.  Precautions:  LE Weight Bearing Status: Weight Bearing as Tolerated  Post-Surgical Precautions: Left hip precautions       Pain:  Pain Assessment  Pain Assessment: 0-10  Pain Score: 3  Pain Type: Surgical pain  Pain Location: Hip    Objective   Cognition:  Overall Cognitive Status: Within Functional Limits  Orientation Level: Oriented X4           Home Living:  Type of Home: House  Lives With: Dependent children  Home Layout: One level  Home Access: Ramped entrance  Bathroom Shower/Tub: Walk-in shower  Bathroom Toilet: Handicapped height  Bathroom Equipment: Shower  chair with back, Grab bars in shower  Prior Function:  Level of [place]: Independent with ADLs and functional transfers, Independent with homemaking with ambulation  ADL Assistance: Independent  Homemaking Assistance: Independent  Ambulatory Assistance: Independent  IADL History:  Homemaking Responsibilities: Yes  Meal Prep Responsibility: Primary  IADL Comments: pt works full time as a   ADL:  Eating Assistance: Independent  Grooming Assistance: Independent  Bathing Assistance: Stand by  UE Dressing Assistance: Independent  LE Dressing Assistance: Stand by  Toileting Assistance with Device: Stand by  Functional Assistance: Stand by (supervision with FWW)  Activity Tolerance:  Endurance: Endurance does not limit participation in activity  Bed Mobility/Transfers: Bed Mobility  Bed Mobility: Yes  Bed Mobility 1  Bed Mobility 1: Supine to sitting  Level of Assistance 1: Modified independent    Transfers  Transfer: Yes  Transfer 1  Transfer From 1: Sit to, Stand to  Transfer to 1: Sit, Stand  Technique 1: Sit to stand, Stand to sit  Transfer Device 1: Walker, Gait belt  Transfer Level of Assistance 1: Modified independent      Ambulation/Gait Training:  Ambulation/Gait Training  Ambulation/Gait Training Performed: Yes (pt ambulates room and hallway with FWW SBA with some cues and education on hip precautions)        Vision:Vision - Basic Assessment  Current Vision: No visual deficits       Strength:  Strength Comments: SHOAIL 5/5       Coordination:  Movements are Fluid and Coordinated: Yes          Outcome Measures:[facility] Daily Activity  Putting on and taking off regular lower body clothing: A little  Bathing (including washing, rinsing, drying): A little  Putting on and taking off regular upper body clothing: None  Toileting, which includes using toilet, bedpan or urinal: A little  Taking care of personal grooming such as brushing teeth: None  Eating Meals: None  Daily Activity - Total Score:  21        Education Documentation  Body Mechanics, taught by Jaqcui Salinas OT at 2/27/2024  8:44 AM.  Learner: Family, Patient  Readiness: Acceptance  Method: Explanation, Demonstration  Response: Verbalizes Understanding, Demonstrated Understanding    Precautions, taught by Jacqui Salinas OT at 2/27/2024  8:44 AM.  Learner: Family, Patient  Readiness: Acceptance  Method: Explanation, Demonstration  Response: Verbalizes Understanding, Demonstrated Understanding    ADL Training, taught by Jacqui Salinas OT at 2/27/2024  8:44 AM.  Learner: Family, Patient  Readiness: Acceptance  Method: Explanation, Demonstration  Response: Verbalizes Understanding, Demonstrated Understanding    Education Comments  No comments found.        Treatment:  pt is educated on hip precautions and how they pertain to ADL and mobility.  Pt performs supine to sit modified independent.  Pt is educate on use of reacher and sock aid with return demo MI other than pt would benefit from wide sock aid.  Pt educated on where to purchase all AE.   Pt performs mobility with FWW supervision with education on hip precautions throughout.         Goals  Encounter Problems       Encounter Problems (Active)       ADLs       Patient with complete lower body dressing with modified independent level of assistance donning all LE clothes  with adaptive equipment while        Start:  02/27/24    Expected End:  03/12/24               COGNITION/SAFETY       Patient will recall and adhere to hip precautions during all functional mobility/ADL tasks in order to demonstrate improved understanding and promote healing post op       Start:  02/27/24    Expected End:  03/12/24               MOBILITY       Patient will perform Functional mobility  Household distances with modified independent level of assistance and front wheeled walker in order to improve safety and functional mobility.       Start:  02/27/24    Expected End:  03/12/24

## 2024-02-27 NOTE — PROGRESS NOTES
Physical Therapy    Physical Therapy Treatment    Patient Name: Jesus Rand  MRN: [id number]  Today's Date: 2/27/2024  Time Calculation  Start Time: 1005  Stop Time: 1036  Time Calculation (min): 31 min       Assessment/Plan    Jesus showed positive recall of exercises performed in previous treatment. Added seated LAQ with some difficulty and muscle cramping in the left quad but no pain. During session pt was administered pain medication by nursing. Pt was able to transfer from sit to stand with good technique within hip precautions. Pt ambulated 200' with FWW demonstrating improved symmetry in stride pattern and stance on the L LE. Pt transferred stand to sit in bed then EOB to supine with good control and technique reporting no pain. Lauren Simmons SPTA under direct supervision of Megan Hsieh, PT, MPT       PT Plan  Treatment/Interventions: Bed mobility, Transfer training, Gait training, Balance training, Neuromuscular re-education, Strengthening, Endurance training, Range of motion, Therapeutic exercise, Therapeutic activity, Home exercise program  PT Plan: Skilled PT  PT Frequency: BID  PT Discharge Recommendations: Low intensity level of continued care  Equipment Recommended upon Discharge: Wheeled walker  PT Recommended Transfer Status: Assist x1, Assistive device  PT - OK to Discharge: Yes (PT eval complete, ok to d/c once deemed medically appropriate.)      General Visit Information:   PT  Visit  PT Received On: 02/27/24  General  Reason for Referral: s/p L HALEY  Referred By: Pat  Past Medical History Relevant to Rehab: HTN  Family/Caregiver Present: Yes  Patient Position Received: Up in chair  General Comment: Pt s/p elective L HALEY by Pat Beckford 2/26/2024    Subjective   Pt presented awake in chair with family member in room. Pt denied any pain and reports that he had walked the carrillo and to the bathroom earlier with noted stiffness but no pain. Pt is motivated to go home and states that home PT has been  scheduled to come tomorrow, 2/28.    Precautions:  Precautions  LE Weight Bearing Status: Weight Bearing as Tolerated  Post-Surgical Precautions: Left hip precautions  Vital Signs:  Vital Signs  Heart Rate: 63  SpO2: 96 %    Objective   Pt demonstrated verbal understanding of 2/3 hip precaution.     Pain:  Pain Assessment  Pain Assessment: 0-10  Pain Score: 0 - No pain  Cognition:  Cognition  Overall Cognitive Status: Within Functional Limits  Orientation Level: Oriented X4       Activity Tolerance:  Activity Tolerance  Endurance: Endurance does not limit participation in activity  Treatments:  Therapeutic Exercise  Therapeutic Exercise Performed: Yes  Therapeutic Exercise Activity 1: Glute Set x 20  Therapeutic Exercise Activity 2: Quad set x 20  Therapeutic Exercise Activity 3: LAQ x 10  Therapeutic Exercise Activity 4: M Resisted HS curl x 20    Therapeutic Activity  Therapeutic Activity Performed: Yes  Therapeutic Activity 1: Ambulation w/ FWW 200ft    Bed Mobility  Bed Mobility: Yes  Bed Mobility 1  Bed Mobility 1: Sitting to supine  Level of Assistance 1: Modified independent    Transfers  Transfer: Yes  Transfer 1  Transfer From 1: Sit to, Stand to  Transfer to 1: Sit, Stand  Technique 1: Sit to stand, Stand to sit  Transfer Device 1: Walker, Gait belt  Transfer Level of Assistance 1: Modified independent    Outcome Measures:  [facility] Basic Mobility  Turning from your back to your side while in a flat bed without using bedrails: None  Moving from lying on your back to sitting on the side of a flat bed without using bedrails: None  Moving to and from bed to chair (including a wheelchair): None  Standing up from a chair using your arms (e.g. wheelchair or bedside chair): None  To walk in hospital room: A little  Climbing 3-5 steps with railing: A little  Basic Mobility - Total Score: 22    Education Documentation  Precautions, taught by TRE Jaramillo at 2/27/2024 11:33 AM.  Learner: Patient  Readiness:  Eager  Method: Explanation, Demonstration  Response: Verbalizes Understanding, Demonstrated Understanding, Needs Reinforcement  Comment: Pt educated on hip precaution lacking recall; avoid internal rotation of L LE. Demonstration given on WB internal rotation tendencies during ambulation.    Mobility Training, taught by TRE Jaramillo at 2/27/2024 11:33 AM.  Learner: Patient  Readiness: Eager  Method: Explanation, Demonstration  Response: Verbalizes Understanding, Demonstrated Understanding, Needs Reinforcement  Comment: Pt educated on hip precaution lacking recall; avoid internal rotation of L LE. Demonstration given on WB internal rotation tendencies during ambulation.    Education Comments  No comments found.        OP EDUCATION:       Encounter Problems       Encounter Problems (Active)       PT Problem       Pt will demonstrate sup > sit and sit > sup bed mobility mod I (Progressing)       Start:  02/26/24    Expected End:  02/28/24            Pt will demo sit > stand and stand > sit transfer with ww and mod I  (Progressing)       Start:  02/26/24    Expected End:  02/28/24            Pt will ambulate 150' with ww and mod I, without LOB  (Progressing)       Start:  02/26/24    Expected End:  02/28/24            Pt will verbalize/demonstrate understanding of posterior hip precautions (Progressing)       Start:  02/26/24    Expected End:  02/28/24            Pt will demo up/down 1 step with appropriate sequencing of surgical extremity, with HR, mod I (Progressing)       Start:  02/26/24    Expected End:  02/28/24               Pain - Adult

## 2024-02-27 NOTE — NURSING NOTE
Discharge Note: 2/27/2024 6003 Discharge instructions and pt responsibilities reviewed with pt, pt daughter, and copy given. Total hip replacement education reviewed with pt, pt daughter, and information sheets given. Pt verbalizes understanding of instructions received, verbalizes understanding of when to seek medical attention, denies any home going or personal care needs. States has already been contacted by [facility] and will start tomorrow. Denies further questions or concerns. Reviewed follow up appts with pt and verbalizes understanding. Carla RADFORD

## 2024-02-27 NOTE — PROGRESS NOTES
Physical Therapy    Physical Therapy Treatment    Patient Name: Jesus Rand  MRN: [id number]  Today's Date: 2/27/2024  Time Calculation  Start Time: 1006  Stop Time: 1037  Time Calculation (min): 31 min       Assessment/Plan   PT Assessment  PT Assessment Results: Decreased strength, Decreased range of motion, Decreased endurance, Impaired balance, Decreased mobility, Impaired judgement, Decreased safety awareness, Orthopedic restrictions, Pain  End of Session Communication: Bedside nurse  Assessment Comment: fJohnny showed positive recall of exercises performed in previous treatment. Added seated LAQ with some difficulty and muscle cramping in the left quad but no pain. During session pt was administered pain medication by nursing. Pt was able to transfer from sit to stand with good technique within hip precautions. Pt ambulated 200' with FWW demonstrating improved symmetry in stride pattern and stance on the L LE. Pt transferred stand to sit in bed then EOB to supine with good control and technique reporting no pain. Lauren Simmons SPTA all clinical decision making under direct supervision of Megan Hsieh, PT, MPT    PT Plan  Inpatient/Swing Bed or Outpatient: Inpatient  PT Plan  Treatment/Interventions: Bed mobility, Transfer training, Gait training, Balance training, Neuromuscular re-education, Strengthening, Endurance training, Range of motion, Therapeutic exercise, Therapeutic activity, Home exercise program  PT Plan: Skilled PT  PT Frequency: BID  PT Discharge Recommendations: Low intensity level of continued care  Equipment Recommended upon Discharge: Wheeled walker  PT Recommended Transfer Status: Assist x1, Assistive device  PT - OK to Discharge: Yes (PT eval complete, ok to d/c once deemed medically appropriate.)      General Visit Information:   PT  Visit  PT Received On: 02/27/24  General  Reason for Referral: s/p CHANTELL DE LEON  Referred By: Pat  Past Medical History Relevant to Rehab: HTN  Family/Caregiver  Present: Yes  Patient Position Received: Up in chair  General Comment: Pt s/p elective L HALEY by Pat Beckford 2/26/2024    Subjective   Pt presented awake in chair with family member in room. Pt denied any pain and reports that he had walked the carrillo and to the bathroom earlier with noted stiffness but no pain. Pt is motivated to go home and states that home PT has been scheduled to come tomorrow, 2/28.    Precautions:  Precautions  LE Weight Bearing Status: Weight Bearing as Tolerated  Post-Surgical Precautions: Left hip precautions  Vital Signs:  Vital Signs  Heart Rate: 63  SpO2: 96 %    Objective   Pt demonstrated verbal understanding of 2/3 hip precaution.     Pain:  Pain Assessment  Pain Assessment: 0-10  Pain Score: 0 - No pain  Cognition:  Cognition  Overall Cognitive Status: Within Functional Limits  Orientation Level: Oriented X4       Activity Tolerance:  Activity Tolerance  Endurance: Endurance does not limit participation in activity  Treatments:  Therapeutic Exercise  Therapeutic Exercise Performed: Yes  Therapeutic Exercise Activity 1: Glute Set x 20  Therapeutic Exercise Activity 2: Quad set x 20  Therapeutic Exercise Activity 3: LAQ x 10  Therapeutic Exercise Activity 4: M Resisted HS curl x 20    Therapeutic Activity  Therapeutic Activity Performed: Yes  Therapeutic Activity 1: Ambulation w/ FWW 200ft    Bed Mobility  Bed Mobility: Yes  Bed Mobility 1  Bed Mobility 1: Sitting to supine  Level of Assistance 1: Modified independent    Transfers  Transfer: Yes  Transfer 1  Transfer From 1: Sit to, Stand to  Transfer to 1: Sit, Stand  Technique 1: Sit to stand, Stand to sit  Transfer Device 1: Gait belt (FWW)  Transfer Level of Assistance 1: Modified independent    Outcome Measures:  [facility] Basic Mobility  Turning from your back to your side while in a flat bed without using bedrails: None  Moving from lying on your back to sitting on the side of a flat bed without using bedrails: None  Moving to and from bed  to chair (including a wheelchair): None  Standing up from a chair using your arms (e.g. wheelchair or bedside chair): None  To walk in hospital room: A little  Climbing 3-5 steps with railing: A little  Basic Mobility - Total Score: 22    Education Documentation  No documentation found.  Education Comments  No comments found.        OP EDUCATION:       Encounter Problems       Encounter Problems (Active)       PT Problem       Pt will demonstrate sup > sit and sit > sup bed mobility mod I (Progressing)       Start:  02/26/24    Expected End:  02/28/24            Pt will demo sit > stand and stand > sit transfer with ww and mod I  (Progressing)       Start:  02/26/24    Expected End:  02/28/24            Pt will ambulate 150' with ww and mod I, without LOB  (Progressing)       Start:  02/26/24    Expected End:  02/28/24            Pt will verbalize/demonstrate understanding of posterior hip precautions (Progressing)       Start:  02/26/24    Expected End:  02/28/24            Pt will demo up/down 1 step with appropriate sequencing of surgical extremity, with HR, mod I (Progressing)       Start:  02/26/24    Expected End:  02/28/24               Pain - Adult

## 2024-02-27 NOTE — CARE PLAN
The patient's goals for the shift include  go home    The clinical goals for the shift include The patient will state a pain level 5/10 or less throughout the rest of this shift. Maintain control of pain, ambulate to bathroom, up to chair for meals

## 2024-02-27 NOTE — DISCHARGE INSTR - OTHER ORDERS
"Total Hip Replacement Discharge Instructions    About this topic  The hip joint is a \"ball and socket\" joint. The \"ball\" part of the joint is the top part of the thigh bone. It is the femoral head. The \"socket\" is a part of the pelvic bone. The “ball” fits into “the socket” in the pelvic bone called the acetabulum. Cartilage covers the parts of the joint in a normal hip. This lets the hip glide easily. The cartilage can become worn and cause bone to rub on other bone. This condition is called osteoarthritis or arthritis. This often leads to pain, stiffness, and trouble walking. Sometimes, drugs and exercises can help you with the pain. When they stop working, you may need hip joint replacement surgery.    What care is needed at home?  Ask your doctor what you need to do when you go home. Make sure you ask questions if you do not understand what the doctor says. This way you will know what you need to do.  Talk to your doctor about how to care for your cut site. Ask your doctor about:  When you should change your bandages  When you may take a bath or shower  If you need to be careful with lifting things over 10 pounds (4.5 kg)  When you may go back to your normal activities like work or driving  Be sure to wash your hands before and after touching your wound or dressing.  Have someone to drive and help you at home to help for the first few weeks.  Keep your house safe and clutter-free. This will make it safer for you to walk.  You may need to put on the long, tight socks given in the hospital while you sleep.  What follow-up care is needed?  You may need to have more therapy before going home. A short stay in rehab or a skilled nursing facility will often help.  You may need to have a nurse come visit you at home to check on you. Physical therapists can also come to your home to help with exercises.  Your doctor may ask you to make visits to the office to check on your progress. Be sure to keep these visits.  If you " have stitches or staples, you will need to have them taken out. Your doctor will often want to do this in 1 to 2 weeks.  You may need to keep doing therapy at an outpatient clinic.  What drugs may be needed?  The doctor may order drugs to:  Help with pain  Prevent blood clots  Prevent infection  Help with other problems you may have like trouble sleeping or hard stools  Will physical activity be limited?  You will need to use a walker or crutches to help you walk. Follow your doctor's orders on how much weight you can put on the operated leg. This can range from no weight-bearing to full weight-bearing.  You may not be allowed to do certain hip movements right after the surgery. These can cause the hip joint to move out of the socket. Ask your doctor what movements are safe for you.  You will be able to do more as you get stronger. Your physical therapist can talk with you about a walking program. Most often, you should limit stair climbing to one flight of stairs per day when you first return home.  Ask your doctor when you can have sex. Once your doctor tells you that it is OK, do not put full weight on your hip joint. To keep stress off the joint, lie on your back and have your partner support his or her weight.  What problems could happen?  Dislocation ? Your doctor may suggest you follow these hip precautions:  Keep your legs from turning in or out.  Avoid moving your operated leg backwards and out to the side.  Avoid crossing your legs or ankles.  Avoid taking long steps when walking.  Avoid kneeling on your operated leg. If you have to kneel, then kneel on both knees so the operated hip is not pushed back.  Avoid the straddling position.  Blood clot or DVT (deep vein thrombosis) ? To avoid these, your doctor may ask you to do ankle pumping exercises. Your doctor also may order blood thinning drugs or compression stockings.  Lung infection ? Taking deep breaths can help keep you from getting a lung infection.  Take at least 10 deep breaths each hour while you are awake.  Infection ? Be sure to take all drugs as ordered.  Other problems during or after surgery may include broken bones, nerve or blood vessel damage, bleeding, the chance of the leg not being the same length as the other leg, or loosening of the prosthesis.  When do I need to call the doctor?  Signs of a very bad reaction. These include wheezing; chest tightness; fever; itching; bad cough; blue skin color; seizures; or swelling of face, lips, tongue, or throat. Go to the ER right away.  Sudden shortness of breath or a sudden onset of chest pain could be a sign that a blood clot has traveled to your lungs. Go to the ER right away.  Signs of infection. These include a fever of 100.4°F (38°C) or higher, chills, pain with passing urine, or wound that will not heal.  Signs of wound infection. These include swelling, redness, warmth around the wound; too much pain when touched; yellowish, greenish, or bloody discharge; foul smell coming from the cut site; cut site opens up.  More pain or swelling in your hip  Helpful tips  Tell your doctor or dentist that you have had a joint replacement before you have a procedure or test. You may need to take a drug or some other precaution before your procedure or test.  For sleeping:  If you sleep on your side, have your operated leg on the bottom. Use a pillow between your legs to keep them from crossing.  Use a pillow between legs when rolling.  If you lie on your back, put a pillow or rolled towel on either side of your operated leg. This may help keep it from rolling inward or outward.  For walking and moving about:  If you step backwards, take small steps and start with the leg that didn't have surgery.  When going up and down steps, always go up with your stronger leg first. When going down steps, lead with your operated leg.  Do not turn or pivot using your operated leg.  Carry a small bag, purse or use a pedro pack  around your waist to keep things like phone numbers, phone, and notepad close by.  In the bathroom:  A raised toilet seat in the bathroom can help you to get up and down easier. A grab bar may also help.  For bathing, a grab bar and tub bench may be helpful.  Around the house:  Sit in higher chairs with firm cushions. It will be easier to get up and down. Avoid low chairs and those without arms.  When getting up from any surface, push up on the chair, bed, or toilet seat. Then, grab your walker once you are standing. Pulling up on the walker may cause it to tip and you to fall.  Make sure all hand rails in your home are secure.  Special tools can make your daily activities easier. You may want to use things like a reacher, long-handled sponge, sock aid, or long-handled shoe horn.  Teach Back: Helping You Understand  The Teach Back Method helps you understand the information we are giving you. After you talk with the staff, tell them in your own words what you learned. This helps to make sure the staff has described each thing clearly. It also helps to explain things that may have been confusing. Before going home, make sure you can do these:  I can tell you about my procedure.  I can tell you how to care for my cut site.  I can tell you what may help ease my pain.  I can tell you what I will do if I have a fever, chills, redness around the cut site, or the wound breaks open.  Last Reviewed Date  2020-08-24

## 2024-02-27 NOTE — DISCHARGE SUMMARY
Discharge Diagnosis  Arthritis of left hip    Issues Requiring Follow-Up  S/P HALEY left hip    Test Results Pending At Discharge  Pending Labs       No current pending labs.            Hospital Course  Pt had HALEY on 2/26/24. He had an uneventful post op course. Discharged as scheduled in am of POD# 1.     Pertinent Physical Exam At Time of Discharge  Physical Exam  Hip exam Expected for post op.  See progress note.  NV intact distally  Calves supple and NT.  Dressing with serosang saturation to be changed this am to Aquacel. No signs of recent or active bleeding.  Home Medications     Medication List      CONTINUE taking these medications     amLODIPine 10 mg tablet; Commonly known as: Norvasc; Take 0.5 tablets (5   mg) by mouth once daily.   aspirin 81 mg EC tablet; TAKE 1 TABLET BY MOUTH ONCE DAILY.   baclofen 10 mg tablet; Commonly known as: Lioresal; Take 1 tablet (10   mg) by mouth 2 times a day.   carvedilol 12.5 mg tablet; Commonly known as: Coreg; Take 1 tablet (12.5   mg) by mouth 2 times a day with meals.   chlorthalidone 25 mg tablet; Commonly known as: [place]gro[place]; TAKE 1 TABLET   BY MOUTH EVERY DAY   Ibuprofen -25 mg capsule per capsule; Generic drug:   ibuprofen-diphenhydramine HCl     Has a script for Percocet 5mg q 4hours prn pain. #28 given.  Outpatient Follow-Up  Future Appointments   Date Time Provider Department Center   3/5/2024 11:30 AM Alexis Stewart MD [id number] [facility]   3/29/2024  3:00 PM Dave Galindo MD MPH [id number] [facility]       Alexis Stewart MD

## 2024-02-27 NOTE — PROGRESS NOTES
02/27/24 1130   Discharge Planning   Living Arrangements Children   Support Systems Children   Assistance Needed Uses a walker   Type of Residence Private residence   Number of Stairs to Enter Residence 3  (has ramp)   Number of Stairs Within Residence 0   Do you have animals or pets at home? No   Who is requesting discharge planning? Provider   Home or Post Acute Services None   Patient expects to be discharged to: Home w/[facility]   Does the patient need discharge transport arranged? No   Financial Resource Strain   How hard is it for you to pay for the very basics like food, housing, medical care, and heating? Not hard   Housing Stability   In the last 12 months, was there a time when you were not able to pay the mortgage or rent on time? N   In the last 12 months, how many places have you lived? 1   In the last 12 months, was there a time when you did not have a steady place to sleep or slept in a shelter (including now)? N   Transportation Needs   In the past 12 months, has lack of transportation kept you from medical appointments or from getting medications? no   In the past 12 months, has lack of transportation kept you from meetings, work, or from getting things needed for daily living? No   Patient Choice   Provider Choice list and CMS website (https://medicare.gov/care-compare#search) for post-acute Quality and Resource Measure Data were provided and reviewed with: Patient;Family   Patient / Family choosing to utilize agency / facility established prior to hospitalization No     Met with pt  and daughter at the bedside and verified address, phone number and emergency contact information. PCP is Dr Galindo last seen in January and pharmacy of choice is [facility]. Pt is independent and lives at with an 18 yr old son and feels safe.  Pt will [facility] services at discharge and denies further needs. Daughter states she is right around the corner if there is something him and my brother need help with. CT to  follow. Bee Davis BSN/RN-TCC

## 2024-02-27 NOTE — NURSING NOTE
Discharge Note: 2/27/2024 1150 Discharged via wheelchair to private car accompanied by PCT, personal belongings taken with pt, no distress noted, no complaints voiced. Carla RADFORD

## 2024-02-27 NOTE — PROGRESS NOTES
"Jesus Rand is a 63 y.o. male on day 1 of admission presenting with Arthritis of left hip.    Subjective   Awake and alert. In bed. Ready for discharge.  Pain is increased yet tolerable. Was out of bed and ambulating yesterday.        Objective     Physical Exam  History of Present Illness  Patient states appropriate pain this am.     Denies any numbness or tingling.  No chest pain or shortness of breath.     Review of Systems   GENERAL: Negative for malaise, significant weight loss, fever  MUSCULOSKELETAL: see HPI  NEURO:  Negative     Exam  Vitals: afebrile, vital signs stable   Dressing with saturated abd at superior aspect from yesterday. No signs of recent bleeding.  Mild to moderate thigh swelling  + Plantar/dorsiflexion  SILT L2-S1  Good cap refill   Calves supple and NT bilat.  Neg Alan test            Last Recorded Vitals  Blood pressure 137/76, pulse 63, temperature 36 °C (96.8 °F), resp. rate 18, height 1.803 m (5' 11\"), weight 105 kg (230 lb 6.4 oz), SpO2 95 %.  Intake/Output last 3 Shifts:  I/O last 3 completed shifts:  In: - (0 mL/kg)   Out: 725 (6.9 mL/kg) [Urine:725 (0.2 mL/kg/hr)]  Weight: 104.5 kg     Relevant Results      Scheduled medications  amLODIPine, 5 mg, oral, Daily  aspirin, 325 mg, oral, BID  baclofen, 10 mg, oral, BID  carvedilol, 12.5 mg, oral, BID with meals  hydroCHLOROthiazide, 25 mg, oral, Daily  ketorolac, 30 mg, intravenous, q6h  psyllium, 1 packet, oral, Daily  vancomycin 1,500 mg in dextrose 5 % in water (D5W) 500 mL IV, 1,500 mg, intravenous, q12h      Continuous medications  lactated Ringer's, 100 mL/hr, Last Rate: 100 mL/hr (02/26/24 1600)  lactated Ringer's, 100 mL/hr, Last Rate: 100 mL/hr (02/26/24 1417)  oxygen, 2 L/min      PRN medications  PRN medications: HYDROmorphone, naloxone  Results for orders placed or performed during the hospital encounter of 02/26/24 (from the past 24 hour(s))   CBC   Result Value Ref Range    WBC 17.7 (H) 4.4 - 11.3 x10*3/uL    nRBC 0.0 0.0 " - 0.0 /100 WBCs    RBC 3.45 (L) 4.50 - 5.90 x10*6/uL    Hemoglobin 11.0 (L) 13.5 - 17.5 g/dL    Hematocrit 31.2 (L) 41.0 - 52.0 %    MCV 90 80 - 100 fL    MCH 31.9 26.0 - 34.0 pg    MCHC 35.3 32.0 - 36.0 g/dL    RDW 12.3 11.5 - 14.5 %    Platelets 236 150 - 450 x10*3/uL   Green Top   Result Value Ref Range    Extra Tube Hold for add-ons.                             Assessment/Plan   Principal Problem:    Arthritis of left hip  Active Problems:    Osteoarthritis of left hip, unspecified osteoarthritis type    POD# 1  Doing well.    Xray looks good.  Reviewed DVT proph. Will take daily ASA at home.  Reviewed hip precautions. He understands and will follow  Reviewed Dental will need ATBs if this becomes necessary. Prefer 3-6 before routine.  PT to see today.  Has script for Percocet to take prn pain.  F/U with me within 10 days of Discharge.  Encouraged ankle pumps  Encouraged incentive zafar.       I spent 35 minutes in the professional and overall care of this patient.      Alexis Stewart MD

## 2024-02-27 NOTE — PROGRESS NOTES
Medication Education     Medication education for Jesus Rand was provided to the patient and family for the following medication(s):  Aspirin  Percocet      Medication education provided by a Pharmacist:  ADR Counseling Medication interactions Dose, frequency, storage How to take and what to do if a dose is missed Proper dose, indication, possible ADRs Refilling the medication  How the medication works and benefits of taking it Importance of compliance Necessary labs and/or other monitoring Any drug interactions (including OTCs and herbvals) and importance of notifying a healthcare provider of any medication changes Potential duration of therapy    Identified potential barriers to education:  None    Method(s) of Education:  Verbal Written materials provided and reviewed    An opportunity to ask questions and receive answers was provided.     Assessment of understanding the patient and family:  2= meets goals/outcomes and Teach back    Additional Notes (if applicable):   - Patient noted that he had existing home supplies of both the ASA and Percocet and will continue @ time of discharge; noted that ASA would likely be a 30-day course for DVT prophylaxis s/p HALEY, and to use Percocet PRN for severe breakthrough post-op pain    David Romano, NathanaelD, BCPS

## 2024-02-28 ENCOUNTER — HOME CARE VISIT (OUTPATIENT)
Dept: HOME HEALTH SERVICES | Facility: HOME HEALTH | Age: 64
End: 2024-02-28
Payer: COMMERCIAL

## 2024-02-28 VITALS
DIASTOLIC BLOOD PRESSURE: 76 MMHG | RESPIRATION RATE: 14 BRPM | TEMPERATURE: 98.2 F | OXYGEN SATURATION: 96 % | SYSTOLIC BLOOD PRESSURE: 132 MMHG | HEART RATE: 61 BPM

## 2024-02-28 PROCEDURE — G0151 HHCP-SERV OF PT,EA 15 MIN: HCPCS

## 2024-02-28 PROCEDURE — 0023 HH SOC

## 2024-02-28 PROCEDURE — G0152 HHCP-SERV OF OT,EA 15 MIN: HCPCS

## 2024-02-28 SDOH — HEALTH STABILITY: PHYSICAL HEALTH: PHYSICAL EXERCISE: SUPINE

## 2024-02-28 SDOH — HEALTH STABILITY: PHYSICAL HEALTH: EXERCISE ACTIVITY: GLUT SETS

## 2024-02-28 SDOH — HEALTH STABILITY: PHYSICAL HEALTH: EXERCISE TYPE: LE EXERCISES

## 2024-02-28 SDOH — HEALTH STABILITY: PHYSICAL HEALTH: PHYSICAL EXERCISE: 15

## 2024-02-28 SDOH — HEALTH STABILITY: PHYSICAL HEALTH: EXERCISE ACTIVITIES SETS: 1

## 2024-02-28 SDOH — HEALTH STABILITY: PHYSICAL HEALTH: EXERCISE ACTIVITY: HEEL SLIDES

## 2024-02-28 SDOH — HEALTH STABILITY: PHYSICAL HEALTH: EXERCISE ACTIVITY: LAQ

## 2024-02-28 SDOH — HEALTH STABILITY: PHYSICAL HEALTH: EXERCISE ACTIVITY: ANKLE PUMPS

## 2024-02-28 SDOH — HEALTH STABILITY: PHYSICAL HEALTH: PHYSICAL EXERCISE: SEATED

## 2024-02-28 SDOH — HEALTH STABILITY: PHYSICAL HEALTH: EXERCISE ACTIVITY: HIP ABD/ADD SLIDES

## 2024-02-28 SDOH — HEALTH STABILITY: PHYSICAL HEALTH: EXERCISE ACTIVITY: QUAD SETS

## 2024-02-28 SDOH — HEALTH STABILITY: PHYSICAL HEALTH: EXERCISE ACTIVITY: MARCHING

## 2024-02-28 ASSESSMENT — ENCOUNTER SYMPTOMS
PAIN LOCATION - PAIN QUALITY: ACHING
HIGHEST PAIN SEVERITY IN PAST 24 HOURS: 8/10
PAIN LOCATION - PAIN FREQUENCY: CONSTANT
PAIN: 1
PAIN LOCATION - PAIN SEVERITY: 7/10
PAIN LOCATION: LEFT HIP
SUBJECTIVE PAIN PROGRESSION: GRADUALLY WORSENING
PAIN SEVERITY GOAL: 0/10
OCCASIONAL FEELINGS OF UNSTEADINESS: 1
PAIN: 1
HIGHEST PAIN SEVERITY IN PAST 24 HOURS: 8/10
PERSON REPORTING PAIN: PATIENT
MUSCLE WEAKNESS: 1
PERSON REPORTING PAIN: PATIENT
LOWEST PAIN SEVERITY IN PAST 24 HOURS: 3/10
PAIN SEVERITY GOAL: 0/10
PAIN LOCATION: LEFT HIP
LOWEST PAIN SEVERITY IN PAST 24 HOURS: 2/10
HYPERTENSION: 1
SUBJECTIVE PAIN PROGRESSION: GRADUALLY IMPROVING
PAIN LOCATION - PAIN SEVERITY: 3/10
PAIN LOCATION - RELIEVING FACTORS: PAIN MEDS, ICE

## 2024-02-28 ASSESSMENT — GAIT ASSESSMENTS
INITIATION OF GAIT IMMEDIATELY AFTER GO: 1 - NO HESITANCY
WALKING STANCE: 1 - HEELS ALMOST TOUCHING WHILE WALKING
STEP CONTINUITY: 0 - STOPPING OR DISCONTINUITY BETWEEN STEPS
GAIT SCORE: 6
PATH: 0 - MARKED DEVIATION
TRUNK SCORE: 0
STEP SYMMETRY: 0 - RIGHT AND LEFT STEP LENGTH NOT EQUAL
BALANCE AND GAIT SCORE: 18
TRUNK: 0 - MARKED SWAY OR USES WALKING AID
PATH SCORE: 0

## 2024-02-28 ASSESSMENT — BALANCE ASSESSMENTS
TURNING 360 DEGREES STEPS: 0 - DISCONTINUOUS STEPS
ARISING SCORE: 1
BALANCE SCORE: 12
STANDING BALANCE: 1 - STEADY BUT WIDE STANCE AND USES CANE OR OTHER SUPPORT
SITTING BALANCE: 1 - STEADY, SAFE
IMMEDIATE STANDING BALANCE FIRST 5 SECONDS: 2 - STEADY WITHOUT WALKER OR OTHER SUPPORT
SITTING DOWN: 1 - USES ARMS OR NOT SMOOTH MOTION
EYES CLOSED AT MAXIMUM POSITION NUDGED: 1 - STEADY
NUDGED: 2 - STEADY
ATTEMPTS TO ARISE: 2 - ABLE TO RISE, ONE ATTEMPT
ARISES: 1 - ABLE, USES ARMS TO HELP
NUDGED SCORE: 2

## 2024-02-28 ASSESSMENT — ACTIVITIES OF DAILY LIVING (ADL)
BATHING_CURRENT_FUNCTION: SUPERVISION
OASIS_M1830: 03
BATHING ASSESSED: 1
ENTERING_EXITING_HOME: STAND BY ASSIST
CURRENT_FUNCTION: STAND BY ASSIST
AMBULATION ASSISTANCE ON FLAT SURFACES: 1
AMBULATION_DISTANCE/DURATION_TOLERATED: 50 FT X 2
DRESSING_LB_CURRENT_FUNCTION: SUPERVISION
AMBULATION ASSISTANCE: STAND BY ASSIST

## 2024-02-29 DIAGNOSIS — M25.552 LEFT HIP PAIN: Primary | ICD-10-CM

## 2024-02-29 RX ORDER — CYCLOBENZAPRINE HCL 10 MG
10 TABLET ORAL 3 TIMES DAILY PRN
Qty: 30 TABLET | Refills: 0 | Status: SHIPPED | OUTPATIENT
Start: 2024-02-29 | End: 2024-03-10

## 2024-03-01 ENCOUNTER — HOME CARE VISIT (OUTPATIENT)
Dept: HOME HEALTH SERVICES | Facility: HOME HEALTH | Age: 64
End: 2024-03-01
Payer: COMMERCIAL

## 2024-03-01 PROCEDURE — G0157 HHC PT ASSISTANT EA 15: HCPCS

## 2024-03-01 SDOH — HEALTH STABILITY: PHYSICAL HEALTH: EXERCISE TYPE: THR

## 2024-03-01 SDOH — HEALTH STABILITY: PHYSICAL HEALTH
EXERCISE COMMENTS: SUPINE:     ANKLE PUMPS     GS     QS     HIP ABD/ADD     HEEL SLIDES  SEATED:  LAQS  STANDING:     MARCHING IN PLACE: LESS THAN 90 DEGREES     HIP EXT     HIP ABD     HS CURLS     TOE RAISES     HEEL RAISES

## 2024-03-01 ASSESSMENT — ENCOUNTER SYMPTOMS
PAIN: 1
MUSCLE WEAKNESS: 1
SUBJECTIVE PAIN PROGRESSION: WAXING AND WANING
LIMITED RANGE OF MOTION: 1
DEPRESSION: 1
NAUSEA: 1
PERSON REPORTING PAIN: PATIENT
PAIN LOCATION - PAIN FREQUENCY: INTERMITTENT
PAIN LOCATION - PAIN SEVERITY: 4/10
LOWEST PAIN SEVERITY IN PAST 24 HOURS: 0/10
PAIN SEVERITY GOAL: 0/10
PAIN LOCATION - EXACERBATING FACTORS: STEPPING WRONG
PAIN LOCATION: LEFT HIP
HIGHEST PAIN SEVERITY IN PAST 24 HOURS: 10/10

## 2024-03-01 ASSESSMENT — ACTIVITIES OF DAILY LIVING (ADL)
AMBULATION ASSISTANCE ON FLAT SURFACES: 1
PHYSICAL TRANSFERS ASSESSED: 1
AMBULATION ASSISTANCE: SUPERVISION
AMBULATION ASSISTANCE: 1
AMBULATION_DISTANCE/DURATION_TOLERATED: X~
CURRENT_FUNCTION: SUPERVISION

## 2024-03-04 ENCOUNTER — APPOINTMENT (OUTPATIENT)
Dept: VASCULAR MEDICINE | Facility: HOSPITAL | Age: 64
End: 2024-03-04
Payer: COMMERCIAL

## 2024-03-04 ENCOUNTER — APPOINTMENT (OUTPATIENT)
Dept: CARDIOLOGY | Facility: HOSPITAL | Age: 64
End: 2024-03-04
Payer: COMMERCIAL

## 2024-03-04 ENCOUNTER — HOSPITAL ENCOUNTER (OUTPATIENT)
Dept: CARDIOLOGY | Facility: HOSPITAL | Age: 64
Discharge: HOME | End: 2024-03-04
Payer: COMMERCIAL

## 2024-03-04 ENCOUNTER — APPOINTMENT (OUTPATIENT)
Dept: RADIOLOGY | Facility: HOSPITAL | Age: 64
End: 2024-03-04
Payer: COMMERCIAL

## 2024-03-04 ENCOUNTER — HOME CARE VISIT (OUTPATIENT)
Dept: HOME HEALTH SERVICES | Facility: HOME HEALTH | Age: 64
End: 2024-03-04
Payer: COMMERCIAL

## 2024-03-04 ENCOUNTER — HOSPITAL ENCOUNTER (EMERGENCY)
Facility: HOSPITAL | Age: 64
Discharge: HOME | End: 2024-03-04
Attending: EMERGENCY MEDICINE
Payer: COMMERCIAL

## 2024-03-04 VITALS — HEART RATE: 80 BPM | SYSTOLIC BLOOD PRESSURE: 132 MMHG | OXYGEN SATURATION: 96 % | DIASTOLIC BLOOD PRESSURE: 72 MMHG

## 2024-03-04 VITALS
BODY MASS INDEX: 32.2 KG/M2 | SYSTOLIC BLOOD PRESSURE: 124 MMHG | TEMPERATURE: 97.7 F | HEIGHT: 71 IN | RESPIRATION RATE: 16 BRPM | WEIGHT: 230 LBS | HEART RATE: 66 BPM | DIASTOLIC BLOOD PRESSURE: 70 MMHG | OXYGEN SATURATION: 96 %

## 2024-03-04 DIAGNOSIS — M79.605 PAIN IN LEFT LEG: ICD-10-CM

## 2024-03-04 DIAGNOSIS — M79.605 LEFT LEG PAIN: ICD-10-CM

## 2024-03-04 DIAGNOSIS — R07.9 CHEST PAIN, UNSPECIFIED TYPE: Primary | ICD-10-CM

## 2024-03-04 LAB
ALBUMIN SERPL BCP-MCNC: 3.9 G/DL (ref 3.4–5)
ALP SERPL-CCNC: 108 U/L (ref 33–136)
ALT SERPL W P-5'-P-CCNC: 63 U/L (ref 10–52)
ANION GAP SERPL CALC-SCNC: 13 MMOL/L (ref 10–20)
AST SERPL W P-5'-P-CCNC: 42 U/L (ref 9–39)
BASOPHILS # BLD AUTO: 0.05 X10*3/UL (ref 0–0.1)
BASOPHILS NFR BLD AUTO: 0.5 %
BILIRUB SERPL-MCNC: 1.1 MG/DL (ref 0–1.2)
BUN SERPL-MCNC: 35 MG/DL (ref 6–23)
CALCIUM SERPL-MCNC: 9 MG/DL (ref 8.6–10.3)
CARDIAC TROPONIN I PNL SERPL HS: 3 NG/L (ref 0–20)
CARDIAC TROPONIN I PNL SERPL HS: 3 NG/L (ref 0–20)
CHLORIDE SERPL-SCNC: 99 MMOL/L (ref 98–107)
CO2 SERPL-SCNC: 27 MMOL/L (ref 21–32)
CREAT SERPL-MCNC: 1.22 MG/DL (ref 0.5–1.3)
EGFRCR SERPLBLD CKD-EPI 2021: 67 ML/MIN/1.73M*2
EOSINOPHIL # BLD AUTO: 0.15 X10*3/UL (ref 0–0.7)
EOSINOPHIL NFR BLD AUTO: 1.5 %
ERYTHROCYTE [DISTWIDTH] IN BLOOD BY AUTOMATED COUNT: 12.4 % (ref 11.5–14.5)
GLUCOSE SERPL-MCNC: 115 MG/DL (ref 74–99)
HCT VFR BLD AUTO: 34 % (ref 41–52)
HGB BLD-MCNC: 11.6 G/DL (ref 13.5–17.5)
HOLD SPECIMEN: NORMAL
IMM GRANULOCYTES # BLD AUTO: 0.08 X10*3/UL (ref 0–0.7)
IMM GRANULOCYTES NFR BLD AUTO: 0.8 % (ref 0–0.9)
LYMPHOCYTES # BLD AUTO: 1.43 X10*3/UL (ref 1.2–4.8)
LYMPHOCYTES NFR BLD AUTO: 14.6 %
MAGNESIUM SERPL-MCNC: 1.92 MG/DL (ref 1.6–2.4)
MCH RBC QN AUTO: 30.3 PG (ref 26–34)
MCHC RBC AUTO-ENTMCNC: 34.1 G/DL (ref 32–36)
MCV RBC AUTO: 89 FL (ref 80–100)
MONOCYTES # BLD AUTO: 0.89 X10*3/UL (ref 0.1–1)
MONOCYTES NFR BLD AUTO: 9.1 %
NEUTROPHILS # BLD AUTO: 7.19 X10*3/UL (ref 1.2–7.7)
NEUTROPHILS NFR BLD AUTO: 73.5 %
NRBC BLD-RTO: 0 /100 WBCS (ref 0–0)
PLATELET # BLD AUTO: 324 X10*3/UL (ref 150–450)
POTASSIUM SERPL-SCNC: 3.6 MMOL/L (ref 3.5–5.3)
PROT SERPL-MCNC: 7.1 G/DL (ref 6.4–8.2)
RBC # BLD AUTO: 3.83 X10*6/UL (ref 4.5–5.9)
SODIUM SERPL-SCNC: 135 MMOL/L (ref 136–145)
WBC # BLD AUTO: 9.8 X10*3/UL (ref 4.4–11.3)

## 2024-03-04 PROCEDURE — 93971 EXTREMITY STUDY: CPT

## 2024-03-04 PROCEDURE — 84484 ASSAY OF TROPONIN QUANT: CPT | Performed by: EMERGENCY MEDICINE

## 2024-03-04 PROCEDURE — 80053 COMPREHEN METABOLIC PANEL: CPT | Performed by: EMERGENCY MEDICINE

## 2024-03-04 PROCEDURE — 99285 EMERGENCY DEPT VISIT HI MDM: CPT | Mod: 25

## 2024-03-04 PROCEDURE — 93005 ELECTROCARDIOGRAM TRACING: CPT

## 2024-03-04 PROCEDURE — 71275 CT ANGIOGRAPHY CHEST: CPT

## 2024-03-04 PROCEDURE — 83735 ASSAY OF MAGNESIUM: CPT | Performed by: EMERGENCY MEDICINE

## 2024-03-04 PROCEDURE — 36415 COLL VENOUS BLD VENIPUNCTURE: CPT | Performed by: EMERGENCY MEDICINE

## 2024-03-04 PROCEDURE — 85025 COMPLETE CBC W/AUTO DIFF WBC: CPT | Performed by: EMERGENCY MEDICINE

## 2024-03-04 PROCEDURE — 93971 EXTREMITY STUDY: CPT | Performed by: STUDENT IN AN ORGANIZED HEALTH CARE EDUCATION/TRAINING PROGRAM

## 2024-03-04 PROCEDURE — 2550000001 HC RX 255 CONTRASTS: Performed by: EMERGENCY MEDICINE

## 2024-03-04 PROCEDURE — G0157 HHC PT ASSISTANT EA 15: HCPCS

## 2024-03-04 RX ADMIN — IOHEXOL 75 ML: 350 INJECTION, SOLUTION INTRAVENOUS at 13:35

## 2024-03-04 SDOH — HEALTH STABILITY: PHYSICAL HEALTH: EXERCISE COMMENTS: THER EX HELD D/T PT C/O CHEST PAIN

## 2024-03-04 SDOH — HEALTH STABILITY: PHYSICAL HEALTH: EXERCISE TYPE: HELD

## 2024-03-04 ASSESSMENT — COLUMBIA-SUICIDE SEVERITY RATING SCALE - C-SSRS
1. IN THE PAST MONTH, HAVE YOU WISHED YOU WERE DEAD OR WISHED YOU COULD GO TO SLEEP AND NOT WAKE UP?: NO
2. HAVE YOU ACTUALLY HAD ANY THOUGHTS OF KILLING YOURSELF?: NO
6. HAVE YOU EVER DONE ANYTHING, STARTED TO DO ANYTHING, OR PREPARED TO DO ANYTHING TO END YOUR LIFE?: NO

## 2024-03-04 ASSESSMENT — ENCOUNTER SYMPTOMS
PAIN LOCATION: CHEST
SUBJECTIVE PAIN PROGRESSION: WAXING AND WANING
NAUSEA: 1
PAIN LOCATION - PAIN SEVERITY: 3/10
PAIN LOCATION - PAIN SEVERITY: 4/10
LOWEST PAIN SEVERITY IN PAST 24 HOURS: 2/10
PAIN LOCATION - PAIN FREQUENCY: INTERMITTENT
PAIN: 1
PAIN LOCATION - RELIEVING FACTORS: REST/POSITIONING
HIGHEST PAIN SEVERITY IN PAST 24 HOURS: 8/10
PAIN SEVERITY GOAL: 0/10
PAIN LOCATION: LEFT HIP
PAIN LOCATION - PAIN DURATION: 3 DAYS
PAIN LOCATION - EXACERBATING FACTORS: WRONG MOVEMENT
PAIN LOCATION - PAIN QUALITY: DULL/ACHEY
MUSCLE WEAKNESS: 1
PAIN LOCATION - PAIN FREQUENCY: CONSTANT
PERSON REPORTING PAIN: PATIENT
PAIN LOCATION - PAIN QUALITY: ACHE

## 2024-03-04 ASSESSMENT — HEART SCORE
AGE: 45-64
HISTORY: SLIGHTLY SUSPICIOUS
TROPONIN: LESS THAN OR EQUAL TO NORMAL LIMIT
ECG: NON-SPECIFIC REPOLARIZATION DISTURBANCE
RISK FACTORS: 1-2 RISK FACTORS
HEART SCORE: 3

## 2024-03-04 ASSESSMENT — PAIN SCALES - GENERAL: PAINLEVEL_OUTOF10: 3

## 2024-03-04 ASSESSMENT — PAIN DESCRIPTION - FREQUENCY: FREQUENCY: INTERMITTENT

## 2024-03-04 ASSESSMENT — ACTIVITIES OF DAILY LIVING (ADL): AMBULATION ASSISTANCE ON FLAT SURFACES: 1

## 2024-03-04 ASSESSMENT — PAIN DESCRIPTION - LOCATION: LOCATION: CHEST

## 2024-03-04 ASSESSMENT — PAIN - FUNCTIONAL ASSESSMENT: PAIN_FUNCTIONAL_ASSESSMENT: 0-10

## 2024-03-04 ASSESSMENT — PAIN DESCRIPTION - DESCRIPTORS: DESCRIPTORS: ACHING

## 2024-03-04 ASSESSMENT — PAIN DESCRIPTION - PROGRESSION: CLINICAL_PROGRESSION: GRADUALLY WORSENING

## 2024-03-04 ASSESSMENT — PAIN DESCRIPTION - ONSET: ONSET: SUDDEN

## 2024-03-04 ASSESSMENT — PAIN DESCRIPTION - PAIN TYPE: TYPE: ACUTE PAIN

## 2024-03-04 ASSESSMENT — PAIN DESCRIPTION - ORIENTATION: ORIENTATION: MID

## 2024-03-04 NOTE — ED PROVIDER NOTES
HPI   Chief Complaint   Patient presents with   • Chest Pain     Patient to ED reference mid sternal non radiating chest pain x 2 days with nausea and dry heaves. Negative any difficulty breathing. He has hip replacement last Monday. He has had a fever off/on. He has swelling with pain to his left leg.       Limitations to History: None    HPI: 63-year-old male presents with concern for left lower extremity pain and swelling.  Patient was also had a low-grade fever.  Intermittent over the past week.  Had his left hip replaced 1 week ago.  Patient also having some chest pain which she describes as retrosternal.  Nonradiating.  Denies any shortness of breath, nausea, vomiting, abdominal pain, urinary symptoms.    Additional History Obtained from: Family at the bedside.    ------------------------------------------------------------------------------------------------------------------------------------------  Physical Exam:    VS: As documented in the triage note and EMR flowsheet from this visit were reviewed.    Appearance: Alert. cooperative,  in no acute distress.   Skin: Left hip dressing in place without surrounding cellulitis.  Eyes: PERRLA, EOMs intact,  Conjunctiva pink with no redness or exudates.   HENT: Normocephalic, atraumatic. Nares patent. No intraoral lesions.   Neck: Supple, without meningismus. Trachea at midline. No lymphadenopathy.  Pulmonary: Clear bilaterally with good chest wall excursion. No rales, rhonchi or wheezing. No accessory muscle use or stridor.  Cardiac: Regular rate and rhythm, no rubs, murmurs, or gallops.   Abdomen: Abdomen is soft, nontender, and nondistended.  No palpable organomegaly.  No rebound or guarding.  No CVA tenderness. Nonsurgical abdomen.  Genitourinary: Exam deferred.  Musculoskeletal: Full range of motion.  Pulses full and equal.  Left lower extremity nonpitting edema.  Neurological: Sensation grossly intact to left lower extremity.    Psychiatric: Appropriate mood  and affect.                            No data recorded                   Patient History   Past Medical History:   Diagnosis Date   • Hypertension      Past Surgical History:   Procedure Laterality Date   • OTHER SURGICAL HISTORY  09/20/2019    Appendectomy   • SHOULDER SURGERY Left      No family history on file.  Social History     Tobacco Use   • Smoking status: Never   • Smokeless tobacco: Never   Vaping Use   • Vaping Use: Never used   Substance Use Topics   • Alcohol use: Not Currently   • Drug use: Never       Physical Exam   ED Triage Vitals [03/04/24 1212]   Temperature Heart Rate Respirations BP   36.5 °C (97.7 °F) 76 18 150/85      Pulse Ox Temp Source Heart Rate Source Patient Position   95 % Temporal Monitor Lying      BP Location FiO2 (%)     Left arm --       Physical Exam    ED Course & MDM   Diagnoses as of 03/04/24 1529   Chest pain, unspecified type   Left leg pain       Medical Decision Making  Labs Reviewed  CBC WITH AUTO DIFFERENTIAL - Abnormal     WBC                           9.8                    nRBC                          0.0                    RBC                           3.83 (*)               Hemoglobin                    11.6 (*)               Hematocrit                    34.0 (*)               MCV                           89                     MCH                           30.3                   MCHC                          34.1                   RDW                           12.4                   Platelets                     324                    Neutrophils %                 73.5                   Immature Granulocytes %, Automated   0.8                    Lymphocytes %                 14.6                   Monocytes %                   9.1                    Eosinophils %                 1.5                    Basophils %                   0.5                    Neutrophils Absolute          7.19                   Immature Granulocytes Absolute, Au*   0.08                    Lymphocytes Absolute          1.43                   Monocytes Absolute            0.89                   Eosinophils Absolute          0.15                   Basophils Absolute            0.05                COMPREHENSIVE METABOLIC PANEL - Abnormal     Glucose                       115 (*)                Sodium                        135 (*)                Potassium                     3.6                    Chloride                      99                     Bicarbonate                   27                     Anion Gap                     13                     Urea Nitrogen                 35 (*)                 Creatinine                    1.22                   eGFR                          67                     Calcium                       9.0                    Albumin                       3.9                    Alkaline Phosphatase          108                    Total Protein                 7.1                    AST                           42 (*)                 Bilirubin, Total              1.1                    ALT                           63 (*)              MAGNESIUM - Normal     Magnesium                     1.92                SERIAL TROPONIN-INITIAL - Normal     Troponin I, High Sensitivity   3                          Narrative: Less than 99th percentile of normal range cutoff-                  Female and children under 18 years old <14 ng/L; Male <21 ng/L: Negative                  Repeat testing should be performed if clinically indicated.                                     Female and children under 18 years old 14-50 ng/L; Male 21-50 ng/L:                  Consistent with possible cardiac damage and possible increased clinical                   risk. Serial measurements may help to assess extent of myocardial damage.                                     >50 ng/L: Consistent with cardiac damage, increased clinical risk and                  myocardial infarction. Serial  measurements may help assess extent of                   myocardial damage.                                      NOTE: Children less than 1 year old may have higher baseline troponin                   levels and results should be interpreted in conjunction with the overall                   clinical context.                                     NOTE: Troponin I testing is performed using a different                   testing methodology at [facility] than at other                   [facility]. Direct result comparisons should only                   be made within the same method.  SERIAL TROPONIN, 1 HOUR - Normal     Troponin I, High Sensitivity   3                          Narrative: Less than 99th percentile of normal range cutoff-                  Female and children under 18 years old <14 ng/L; Male <21 ng/L: Negative                  Repeat testing should be performed if clinically indicated.                                     Female and children under 18 years old 14-50 ng/L; Male 21-50 ng/L:                  Consistent with possible cardiac damage and possible increased clinical                   risk. Serial measurements may help to assess extent of myocardial damage.                                     >50 ng/L: Consistent with cardiac damage, increased clinical risk and                  myocardial infarction. Serial measurements may help assess extent of                   myocardial damage.                                      NOTE: Children less than 1 year old may have higher baseline troponin                   levels and results should be interpreted in conjunction with the overall                   clinical context.                                     NOTE: Troponin I testing is performed using a different                   testing methodology at [facility] than at other                   [facility]. Direct result comparisons should only                   be made  within the same method.  TROPONIN SERIES- (INITIAL, 1 HR)  CT angio chest for pulmonary embolism   Final Result    1.  No evidence of acute pathology.                MACRO:    None          Signed by: Ijeoma Alexander 3/4/2024 2:10 PM    Dictation workstation:   [id number]     Vascular US lower extremity venous duplex left        Medical Decision Making:    Patient appears well nontoxic.  Vital signs within normal limits.  High-sensitivity troponin negative x 2.  No evidence of DVT.  CTA negative.  No white blood cell count or fever.  Case was discussed with orthopedic surgeon Dr. Foley who is agreeable with outpatient follow-up.  Stable at time of discharge.    Differential Diagnoses Considered: Pulmonary embolism, DVT, pneumonia, ACS, postsurgical changes    Independent Interpretation of Studies:  I independently interpreted: CTA shows no evidence of central pulmonary embolism.    Escalation of Care:  Appropriate for discharge and follow-up with orthopedic surgery.    Discussion of Management with Other Providers:   I discussed the patient/results with: Orthopedic surgery.        Procedure  ECG 12 lead    Performed by: Zeb Murray DO  Authorized by: Zeb Murray DO    ECG interpreted by ED Physician in the absence of a cardiologist: yes    Comments:      EKG interpreted by Dr. Zeb Murray: Normal sinus rhythm at 85 bpm.  PA interval 126 ms.  QTc of 433 ms.  No evidence of ST elevation or depression at this time.       Zeb Murray DO  03/04/24 7699

## 2024-03-05 ENCOUNTER — OFFICE VISIT (OUTPATIENT)
Dept: ORTHOPEDIC SURGERY | Facility: CLINIC | Age: 64
End: 2024-03-05
Payer: COMMERCIAL

## 2024-03-05 ENCOUNTER — HOSPITAL ENCOUNTER (OUTPATIENT)
Dept: RADIOLOGY | Facility: CLINIC | Age: 64
Discharge: HOME | End: 2024-03-05
Payer: COMMERCIAL

## 2024-03-05 DIAGNOSIS — M16.12 PRIMARY OSTEOARTHRITIS OF LEFT HIP: ICD-10-CM

## 2024-03-05 PROCEDURE — 1036F TOBACCO NON-USER: CPT | Performed by: SPECIALIST

## 2024-03-05 PROCEDURE — 73502 X-RAY EXAM HIP UNI 2-3 VIEWS: CPT | Mod: LT

## 2024-03-05 PROCEDURE — 99024 POSTOP FOLLOW-UP VISIT: CPT | Performed by: SPECIALIST

## 2024-03-05 PROCEDURE — 73502 X-RAY EXAM HIP UNI 2-3 VIEWS: CPT | Mod: LEFT SIDE | Performed by: RADIOLOGY

## 2024-03-05 ASSESSMENT — PAIN SCALES - GENERAL: PAINLEVEL_OUTOF10: 1

## 2024-03-05 ASSESSMENT — PAIN DESCRIPTION - DESCRIPTORS: DESCRIPTORS: ACHING

## 2024-03-05 NOTE — ASSESSMENT & PLAN NOTE
Assessment: 8 days status post left total hip arthroplasty.  The patient was in the emergency room yesterday because of chest pain.  A duplex scan ruled out DVT and a CT scan ruled out PE.  He received laboratory testing for rule out MI.    Plan:  Continue to mobilize with a walker.  Physical therapy prescription was given to the patient.  He will continue to take a daily aspirin for DVT prophylaxis for the next 6 weeks.  I reviewed protocol for posterior dislocation prevention.  Discussed dental precautions once again.  He states that he has plenty of pain medication and is essentially not needing or using this.  Follow-up in 3 to 4 weeks for reevaluation with new x-rays.

## 2024-03-06 ENCOUNTER — HOME CARE VISIT (OUTPATIENT)
Dept: HOME HEALTH SERVICES | Facility: HOME HEALTH | Age: 64
End: 2024-03-06
Payer: COMMERCIAL

## 2024-03-06 VITALS
TEMPERATURE: 98.4 F | SYSTOLIC BLOOD PRESSURE: 130 MMHG | HEART RATE: 75 BPM | OXYGEN SATURATION: 94 % | RESPIRATION RATE: 14 BRPM | DIASTOLIC BLOOD PRESSURE: 80 MMHG

## 2024-03-06 PROCEDURE — G0151 HHCP-SERV OF PT,EA 15 MIN: HCPCS

## 2024-03-06 SDOH — HEALTH STABILITY: PHYSICAL HEALTH: EXERCISE ACTIVITIES SETS: 1

## 2024-03-06 SDOH — HEALTH STABILITY: PHYSICAL HEALTH: EXERCISE ACTIVITY: MINI SQUATS

## 2024-03-06 SDOH — HEALTH STABILITY: PHYSICAL HEALTH: PHYSICAL EXERCISE: 15

## 2024-03-06 SDOH — HEALTH STABILITY: PHYSICAL HEALTH: EXERCISE TYPE: LE EXERCISES

## 2024-03-06 SDOH — HEALTH STABILITY: PHYSICAL HEALTH: PHYSICAL EXERCISE: STANDING

## 2024-03-06 SDOH — HEALTH STABILITY: PHYSICAL HEALTH: EXERCISE ACTIVITY: HIP EXTENSION

## 2024-03-06 SDOH — HEALTH STABILITY: PHYSICAL HEALTH: EXERCISE ACTIVITY: HS CURLS

## 2024-03-06 SDOH — HEALTH STABILITY: PHYSICAL HEALTH: EXERCISE ACTIVITY: HIP ABDUCTION

## 2024-03-06 SDOH — HEALTH STABILITY: PHYSICAL HEALTH: EXERCISE ACTIVITY: HEEL RAISES/TOE RAISES

## 2024-03-06 SDOH — HEALTH STABILITY: PHYSICAL HEALTH: EXERCISE ACTIVITY: MARCHING

## 2024-03-06 ASSESSMENT — BALANCE ASSESSMENTS
ATTEMPTS TO ARISE: 2 - ABLE TO RISE, ONE ATTEMPT
IMMEDIATE STANDING BALANCE FIRST 5 SECONDS: 2 - STEADY WITHOUT WALKER OR OTHER SUPPORT
STANDING BALANCE: 2 - NARROW STANCE WITHOUT SUPPORT
NUDGED: 2 - STEADY
SITTING DOWN: 1 - USES ARMS OR NOT SMOOTH MOTION
ARISING SCORE: 1
NUDGED SCORE: 2
EYES CLOSED AT MAXIMUM POSITION NUDGED: 1 - STEADY
ARISES: 1 - ABLE, USES ARMS TO HELP
SITTING BALANCE: 1 - STEADY, SAFE
BALANCE SCORE: 14
TURNING 360 DEGREES STEPS: 1 - CONTINUOUS STEPS

## 2024-03-06 ASSESSMENT — ENCOUNTER SYMPTOMS
LOWEST PAIN SEVERITY IN PAST 24 HOURS: 2/10
PAIN LOCATION: LEFT HIP
MUSCLE WEAKNESS: 1
PAIN LOCATION - PAIN FREQUENCY: CONSTANT
PERSON REPORTING PAIN: PATIENT
OCCASIONAL FEELINGS OF UNSTEADINESS: 1
PAIN SEVERITY GOAL: 0/10
PAIN LOCATION - RELIEVING FACTORS: WALKING
PAIN LOCATION - PAIN SEVERITY: 2/10
SUBJECTIVE PAIN PROGRESSION: GRADUALLY IMPROVING
HIGHEST PAIN SEVERITY IN PAST 24 HOURS: 9/10
PAIN LOCATION - EXACERBATING FACTORS: STANDING UP
PAIN LOCATION - PAIN QUALITY: ACHING
PAIN: 1

## 2024-03-06 ASSESSMENT — ACTIVITIES OF DAILY LIVING (ADL)
AMBULATION ASSISTANCE ON FLAT SURFACES: 1
AMBULATION_DISTANCE/DURATION_TOLERATED: 150 FT
OASIS_M1830: 00
HOME_HEALTH_OASIS: 00

## 2024-03-06 ASSESSMENT — GAIT ASSESSMENTS
PATH: 1 - MILD/MODERATE DEVIATION OR USES WALKING AID
STEP SYMMETRY: 1 - RIGHT AND LEFT STEP LENGTH APPEAR EQUAL
INITIATION OF GAIT IMMEDIATELY AFTER GO: 1 - NO HESITANCY
STEP CONTINUITY: 1 - STEPS APPEAR CONTINUOUS
GAIT SCORE: 10
WALKING STANCE: 1 - HEELS ALMOST TOUCHING WHILE WALKING
BALANCE AND GAIT SCORE: 24
PATH SCORE: 1
TRUNK SCORE: 1
TRUNK: 1 - NO SWAY BUT FLEXION OF KNEES OR BACK OR SPREADS ARMS WHILE WALKING

## 2024-03-12 DIAGNOSIS — M16.12 PRIMARY OSTEOARTHRITIS OF LEFT HIP: ICD-10-CM

## 2024-03-16 LAB
ATRIAL RATE: 76 BPM
ATRIAL RATE: 85 BPM
P AXIS: 61 DEGREES
P AXIS: 71 DEGREES
P OFFSET: 199 MS
P OFFSET: 201 MS
P ONSET: 143 MS
P ONSET: 146 MS
PR INTERVAL: 126 MS
PR INTERVAL: 134 MS
Q ONSET: 209 MS
Q ONSET: 210 MS
QRS COUNT: 13 BEATS
QRS COUNT: 14 BEATS
QRS DURATION: 92 MS
QRS DURATION: 94 MS
QT INTERVAL: 364 MS
QT INTERVAL: 384 MS
QTC CALCULATION(BAZETT): 432 MS
QTC CALCULATION(BAZETT): 433 MS
QTC FREDERICIA: 408 MS
QTC FREDERICIA: 415 MS
R AXIS: -24 DEGREES
R AXIS: -27 DEGREES
T AXIS: 51 DEGREES
T AXIS: 67 DEGREES
T OFFSET: 391 MS
T OFFSET: 402 MS
VENTRICULAR RATE: 76 BPM
VENTRICULAR RATE: 85 BPM

## 2024-03-26 DIAGNOSIS — Z48.89 ENCOUNTER FOR POSTOPERATIVE CARE: ICD-10-CM

## 2024-03-28 ENCOUNTER — HOSPITAL ENCOUNTER (OUTPATIENT)
Dept: RADIOLOGY | Facility: CLINIC | Age: 64
Discharge: HOME | End: 2024-03-28
Payer: COMMERCIAL

## 2024-03-28 ENCOUNTER — OFFICE VISIT (OUTPATIENT)
Dept: ORTHOPEDIC SURGERY | Facility: CLINIC | Age: 64
End: 2024-03-28
Payer: COMMERCIAL

## 2024-03-28 DIAGNOSIS — Z48.89 ENCOUNTER FOR POSTOPERATIVE CARE: ICD-10-CM

## 2024-03-28 PROCEDURE — 73502 X-RAY EXAM HIP UNI 2-3 VIEWS: CPT | Mod: LEFT SIDE | Performed by: RADIOLOGY

## 2024-03-28 PROCEDURE — 99024 POSTOP FOLLOW-UP VISIT: CPT | Performed by: SPECIALIST

## 2024-03-28 PROCEDURE — 1036F TOBACCO NON-USER: CPT | Performed by: SPECIALIST

## 2024-03-28 PROCEDURE — 76882 US LMTD JT/FCL EVL NVASC XTR: CPT | Performed by: SPECIALIST

## 2024-03-28 PROCEDURE — 73502 X-RAY EXAM HIP UNI 2-3 VIEWS: CPT | Mod: LT

## 2024-03-28 ASSESSMENT — PAIN DESCRIPTION - DESCRIPTORS: DESCRIPTORS: ACHING

## 2024-03-28 ASSESSMENT — PAIN - FUNCTIONAL ASSESSMENT: PAIN_FUNCTIONAL_ASSESSMENT: 0-10

## 2024-03-28 ASSESSMENT — PAIN SCALES - GENERAL: PAINLEVEL_OUTOF10: 2

## 2024-03-28 NOTE — ASSESSMENT & PLAN NOTE
Assessment: 1 month status post left total hip arthroplasty.  States he is doing well and he is returning to activities of daily living with minimal pain.    Plan: Follow-up in 1 month for reevaluation with new x-rays.  Slowly advance his activities weaning from the cane.  I reviewed hip precautions with him and he understands these.  He will continue his baby aspirin for another 2 to 4 weeks.  Antibiotics for dental work he understands he will need these for the first year after the hip arthroplasty.  He will try to avoid routine dental work for the next several months.

## 2024-03-28 NOTE — PROGRESS NOTES
Assessment/Plan   Encounter Diagnoses:  Encounter for postoperative care  Osteoarthritis of left hip, unspecified osteoarthritis type  Assessment: 1 month status post left total hip arthroplasty.  States he is doing well and he is returning to activities of daily living with minimal pain.    Plan: Follow-up in 1 month for reevaluation with new x-rays.  Slowly advance his activities weaning from the cane.  I reviewed hip precautions with him and he understands these.  He will continue his baby aspirin for another 2 to 4 weeks.  Antibiotics for dental work he understands he will need these for the first year after the hip arthroplasty.  He will try to avoid routine dental work for the next several months.       Subjective    Patient ID: Jesus Rand is a 64 y.o. male.    Chief Complaint: Post-op of the Left Hip (Patient had a Left Arthroplasty Total Hip Posterior Approach on 02/26/2024)     Last Surgery: Arthroplasty Total Hip Posterior Approach - Left  Last Surgery Date: 2/26/2024    HPI  64-year-old 1 month status post total hip arthroplasty on the left.  He is making good progress.  He uses a cane but is forgetting it and walking away from the cane often.    OBJECTIVE: ORTHO EXAM  Left hip:  Minimal antalgic gait uses a cane especially at start up.  He is starting to leave his cane and have to go back for it.  Negative Trendelenburg sign  Skin healthy and intact  No tenderness to palpation over lumbar spine  Mild tenderness over greater trochanter  Minimal tenderness referable to groin especially with IR     Full forward flexion  Symmetric motion, no loss of internal rotation   Moderate weakness with resisted hip flexion, abduction or adduction     Minimally positive flexion/adduction/internal rotation  Negative flexion/abduction/external rotation test  Negative straight leg raise  Neurovascular exam normal distally    IMAGE RESULTS:  ECG 12 lead  Normal sinus rhythm  Normal ECG  When compared with ECG of  04-MAR-2024 12:12, (unconfirmed)  No significant change was found  See ED provider note for full interpretation and clinical correlation  Confirmed by Marely Oliver ([id number]) on 3/16/2024 2:46:08 PM  ECG 12 lead  Normal sinus rhythm  Normal ECG  When compared with ECG of 15-ALECIA-2024 15:09,  No significant change was found  See ED provider note for full interpretation and clinical correlation  Confirmed by Marely Oliver ([id number]) on 3/16/2024 2:44:44 PM      ULTRASOUND  DIAGNOSTIC ULTRASOUND REPORT FINAL: Left HIP  Sonographer: Alexis Stewart MD  Indication:  Hip Pain  Procedure: Ultrasound, extremity, nonvascular, real-time, COMPLETE, anatomic specific  Technique: B-Mode Ultrasound Examination performed using 8-13 MHz linear transducer with [employer] Software  STUDY TYPE:   1. ULTRASOUND EXTREMITY  2. REAL TIME WITH IMAGE DOCUMENTATION  3. NON VASCULAR  4. COMPLETE STUDY, INCLUDING BUT NOT LIMITED TO MUSCLE, TENDONS, LIGAMENTS, SOFT TISSUES, ADIPOSE TISSUE AND SUBCUTANEOUS TISSUE.  Site:   HIP  Live ultrasound was performed of the patient's  HIP and PERMANENTLY documented. This is a COMPLETE and FINAL ULTRASOUND REPORT of the patient's  HIP.  Non sterile gloves were used for this procedure with a sterile technique. Gauze pads were then used to clean the area after the procedure was compete. . I personally performed the ultrasound and reviewed the findings. These show:  Live ultrasound was performed of the patient's HIP that shows an intact IT BAND with the vastus lateralis muscle fibers showing normal striations.  Mild] fluid amount noted within the trochanteric bursa. No evidence of osseous pathology deep to the trochanteric bursa.  Some soft tissue fluid collection is noted at the inferior aspect of the incision consistent with resolving hematoma.  Minimal joint effusion noted. The patient tolerated the procedure well.    Procedures     Orders Placed This Encounter    Point of Care Ultrasound

## 2024-03-29 ENCOUNTER — OFFICE VISIT (OUTPATIENT)
Dept: PRIMARY CARE | Facility: CLINIC | Age: 64
End: 2024-03-29
Payer: COMMERCIAL

## 2024-03-29 VITALS
BODY MASS INDEX: 32.19 KG/M2 | OXYGEN SATURATION: 94 % | HEART RATE: 67 BPM | DIASTOLIC BLOOD PRESSURE: 70 MMHG | WEIGHT: 230.8 LBS | SYSTOLIC BLOOD PRESSURE: 124 MMHG

## 2024-03-29 DIAGNOSIS — Z00.00 HEALTHCARE MAINTENANCE: ICD-10-CM

## 2024-03-29 DIAGNOSIS — I16.1 HYPERTENSIVE EMERGENCY: ICD-10-CM

## 2024-03-29 DIAGNOSIS — Z13.220 LIPID SCREENING: Primary | ICD-10-CM

## 2024-03-29 DIAGNOSIS — I10 PRIMARY HYPERTENSION: ICD-10-CM

## 2024-03-29 DIAGNOSIS — Z12.5 PROSTATE CANCER SCREENING: ICD-10-CM

## 2024-03-29 PROCEDURE — 3074F SYST BP LT 130 MM HG: CPT | Performed by: STUDENT IN AN ORGANIZED HEALTH CARE EDUCATION/TRAINING PROGRAM

## 2024-03-29 PROCEDURE — 99213 OFFICE O/P EST LOW 20 MIN: CPT | Performed by: STUDENT IN AN ORGANIZED HEALTH CARE EDUCATION/TRAINING PROGRAM

## 2024-03-29 PROCEDURE — 3078F DIAST BP <80 MM HG: CPT | Performed by: STUDENT IN AN ORGANIZED HEALTH CARE EDUCATION/TRAINING PROGRAM

## 2024-03-29 PROCEDURE — 1036F TOBACCO NON-USER: CPT | Performed by: STUDENT IN AN ORGANIZED HEALTH CARE EDUCATION/TRAINING PROGRAM

## 2024-03-29 RX ORDER — CHLORTHALIDONE 25 MG/1
25 TABLET ORAL DAILY
Qty: 90 TABLET | Refills: 3 | Status: SHIPPED | OUTPATIENT
Start: 2024-03-29

## 2024-03-29 RX ORDER — AMLODIPINE BESYLATE 10 MG/1
10 TABLET ORAL DAILY
Qty: 90 TABLET | Refills: 3 | Status: SHIPPED | OUTPATIENT
Start: 2024-03-29 | End: 2025-03-29

## 2024-03-29 RX ORDER — CHLORTHALIDONE 25 MG/1
25 TABLET ORAL DAILY
Qty: 90 TABLET | Refills: 1 | Status: SHIPPED | OUTPATIENT
Start: 2024-03-29 | End: 2024-03-29 | Stop reason: DRUGHIGH

## 2024-03-29 RX ORDER — CARVEDILOL 12.5 MG/1
12.5 TABLET ORAL
Qty: 180 TABLET | Refills: 3 | Status: SHIPPED | OUTPATIENT
Start: 2024-03-29 | End: 2025-03-29

## 2024-03-29 NOTE — PROGRESS NOTES
Subjective   Patient ID: Jesus Rand is a 64 y.o. male who presents for Follow-up (Pt is here today for 2 month FUV. Reports he did get his hip replaced. ).    HPI    Here for follow up.     Underwent hip replacement. Recovering well. Undergoing PT.     Follow up regarding HTN- under control with current regimen. No side-effects from the medication.   Will consider reducing the number of tablets at next visit possibly if he is more active by then.     Review of Systems  ROS negative except discussed above in HPI.    Vitals:    03/29/24 1459   BP: 124/70   Pulse: 67   SpO2: 94%     Objective   Physical Exam  Constitutional:       Appearance: Normal appearance.   Cardiovascular:      Rate and Rhythm: Normal rate and regular rhythm.   Pulmonary:      Effort: Pulmonary effort is normal.      Breath sounds: Normal breath sounds.   Musculoskeletal:      Cervical back: Normal range of motion and neck supple.   Lymphadenopathy:      Cervical: No cervical adenopathy.   Neurological:      Mental Status: He is alert.           Assessment/Plan   Jesus was seen today for follow-up.  Diagnoses and all orders for this visit:  Lipid screening (Primary)  -     Lipid Panel; Future  Primary hypertension  -     chlorthalidone (Hygroton) 25 mg tablet; Take 1 tablet (25 mg) by mouth once daily.  -     carvedilol (Coreg) 12.5 mg tablet; Take 1 tablet (12.5 mg) by mouth 2 times a day with meals.  -     amLODIPine (Norvasc) 10 mg tablet; Take 1 tablet (10 mg) by mouth once daily.  -     Comprehensive Metabolic Panel; Future  -     CBC and Auto Differential; Future  Hypertensive emergency  -     carvedilol (Coreg) 12.5 mg tablet; Take 1 tablet (12.5 mg) by mouth 2 times a day with meals.  -     amLODIPine (Norvasc) 10 mg tablet; Take 1 tablet (10 mg) by mouth once daily.  Prostate cancer screening  -     Prostate Specific Antigen, Screen; Future  Healthcare maintenance  -     CBC and Auto Differential; Future  Other orders  -     Referral  to Primary Care      Follow up in 3 months.          Dave Galindo MD MPH

## 2024-04-30 ENCOUNTER — HOSPITAL ENCOUNTER (OUTPATIENT)
Dept: RADIOLOGY | Facility: CLINIC | Age: 64
Discharge: HOME | End: 2024-04-30
Payer: COMMERCIAL

## 2024-04-30 ENCOUNTER — HOSPITAL ENCOUNTER (OUTPATIENT)
Dept: RADIOLOGY | Facility: EXTERNAL LOCATION | Age: 64
Discharge: HOME | End: 2024-04-30

## 2024-04-30 ENCOUNTER — OFFICE VISIT (OUTPATIENT)
Dept: ORTHOPEDIC SURGERY | Facility: CLINIC | Age: 64
End: 2024-04-30
Payer: COMMERCIAL

## 2024-04-30 DIAGNOSIS — M16.12 OSTEOARTHRITIS OF LEFT HIP, UNSPECIFIED OSTEOARTHRITIS TYPE: ICD-10-CM

## 2024-04-30 PROCEDURE — 99024 POSTOP FOLLOW-UP VISIT: CPT | Performed by: SPECIALIST

## 2024-04-30 PROCEDURE — 1036F TOBACCO NON-USER: CPT | Performed by: SPECIALIST

## 2024-04-30 PROCEDURE — 76882 US LMTD JT/FCL EVL NVASC XTR: CPT | Performed by: SPECIALIST

## 2024-04-30 PROCEDURE — 73502 X-RAY EXAM HIP UNI 2-3 VIEWS: CPT | Mod: LT

## 2024-04-30 PROCEDURE — 73502 X-RAY EXAM HIP UNI 2-3 VIEWS: CPT | Mod: LEFT SIDE | Performed by: RADIOLOGY

## 2024-04-30 ASSESSMENT — PAIN SCALES - GENERAL: PAINLEVEL_OUTOF10: 2

## 2024-04-30 ASSESSMENT — PAIN DESCRIPTION - DESCRIPTORS: DESCRIPTORS: ACHING

## 2024-04-30 ASSESSMENT — PAIN - FUNCTIONAL ASSESSMENT: PAIN_FUNCTIONAL_ASSESSMENT: 0-10

## 2024-04-30 NOTE — PROGRESS NOTES
Assessment/Plan   Encounter Diagnoses:  Osteoarthritis of left hip, unspecified osteoarthritis type  Osteoarthritis of left hip, unspecified osteoarthritis type  Assessment: 2.5 month status post left total hip arthroplasty.  States he is doing well and he is returning to activities of daily living with no pain.    Plan: Follow-up in 3 month for reevaluation with new x-rays.  Continue to advance his activities.  I reviewed hip precautions with him and he understands these.  He will continue his baby aspirin per his internist.    Antibiotics for dental work he understands he will need these for the first year after the hip arthroplasty.  He will try to avoid routine dental work for the next several months.   He would like to return to a full duty at work and we gave him a note to that effect.  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  At the end of his visit he mentioned his right shoulder was bothering him.  He will return to follow-up with me at another time for this.  X-rays of the right shoulder should be obtained.      Subjective    Patient ID: Jesus Rand is a 64 y.o. male.    Chief Complaint: Post-op of the Left Hip     Last Surgery: Arthroplasty Total Hip Posterior Approach - Left  Last Surgery Date: 2/26/2024    HPI  64-year-old who is 2-1/2 months status post total hip arthroplasty.  He is doing quite well.  He is ambulating without an antalgic limp.  He does have a little bit of some start up pain if he sits for a long time.  He is returning to activities of daily living.  He states he did some yard work without any concerns or complaints.    OBJECTIVE: ORTHO EXAM  Left hip  His incision is clean and dry.  He has some soreness to palpation especially in the inferior aspect of the incision.  He was concerned about some swelling in that area.  He is neurovascularly intact distally.  His range of motion is good with flexion to 90 and extension to 10 degrees.  Abduction was 45 degrees and  adduction was just past neutral without any complaint.  He understands that he should avoid further abduction.  That is part of his hip precaution.    IMAGE RESULTS:  Point of Care Ultrasound  These images are not reportable by radiology and will not be interpreted   by  Radiologists.      X-rays of his hips show the implant to be in a good position.  There is no signs of loosening.    ULTRASOUND  DIAGNOSTIC ULTRASOUND REPORT FINAL: RIGHT HIP  Sonographer: Alexis Stewart MD  Indication:  Hip Pain  Procedure: Ultrasound, extremity, nonvascular, real-time, COMPLETE, anatomic specific  Technique: B-Mode Ultrasound Examination performed using 8-13 MHz linear transducer with [employer] Software  STUDY TYPE:   1. ULTRASOUND EXTREMITY  2. REAL TIME WITH IMAGE DOCUMENTATION  3. NON VASCULAR  4. COMPLETE STUDY, INCLUDING BUT NOT LIMITED TO MUSCLE, TENDONS, LIGAMENTS, SOFT TISSUES, ADIPOSE TISSUE AND SUBCUTANEOUS TISSUE.  Site:   HIP  Live ultrasound was performed of the patient's  HIP and PERMANENTLY documented. This is a COMPLETE and FINAL ULTRASOUND REPORT of the patient's  HIP.  Non sterile gloves were used for this procedure with a sterile technique. Gauze pads were then used to clean the area after the procedure was compete. . I personally performed the ultrasound and reviewed the findings. These show:  Live ultrasound was performed of the patient's HIP that shows an intact IT BAND with the vastus lateralis muscle fibers showing normal striations.  No fluid amount noted within the trochanteric bursa. No evidence of osseous pathology deep to the trochanteric bursa.  No significant soft tissue fluid collection/abscess appreciated.  No joint effusion noted. The patient tolerated the procedure well.  Distally there is minimal free fluid that is consistent with his early postoperative time.    Procedures     Orders Placed This Encounter    Point of Care Ultrasound

## 2024-04-30 NOTE — ASSESSMENT & PLAN NOTE
Assessment: 2.5 month status post left total hip arthroplasty.  States he is doing well and he is returning to activities of daily living with no pain.    Plan: Follow-up in 3 month for reevaluation with new x-rays.  Continue to advance his activities.  I reviewed hip precautions with him and he understands these.  He will continue his baby aspirin per his internist.    Antibiotics for dental work he understands he will need these for the first year after the hip arthroplasty.  He will try to avoid routine dental work for the next several months.

## 2024-05-20 ENCOUNTER — OFFICE VISIT (OUTPATIENT)
Dept: URGENT CARE | Facility: CLINIC | Age: 64
End: 2024-05-20
Payer: COMMERCIAL

## 2024-05-20 VITALS
HEART RATE: 61 BPM | RESPIRATION RATE: 18 BRPM | BODY MASS INDEX: 30.07 KG/M2 | HEIGHT: 72 IN | SYSTOLIC BLOOD PRESSURE: 131 MMHG | OXYGEN SATURATION: 96 % | DIASTOLIC BLOOD PRESSURE: 78 MMHG | TEMPERATURE: 98.2 F | WEIGHT: 222 LBS

## 2024-05-20 DIAGNOSIS — H60.391 OTHER INFECTIVE ACUTE OTITIS EXTERNA OF RIGHT EAR: ICD-10-CM

## 2024-05-20 DIAGNOSIS — H61.21 IMPACTED CERUMEN OF RIGHT EAR: Primary | ICD-10-CM

## 2024-05-20 PROCEDURE — 99213 OFFICE O/P EST LOW 20 MIN: CPT | Performed by: NURSE PRACTITIONER

## 2024-05-20 RX ORDER — OFLOXACIN 3 MG/ML
5 SOLUTION AURICULAR (OTIC) 2 TIMES DAILY
Qty: 5 ML | Refills: 0 | Status: SHIPPED | OUTPATIENT
Start: 2024-05-20 | End: 2024-05-23

## 2024-05-20 NOTE — PROGRESS NOTES
64 y.o. male presents for evaluation of right ear fullness and decreased hearing that began 3 days ago.  States he has a history of cerumen impaction.  Does work in a [employer] environment.  Denies fever, ear pain, URI symptoms or any other associated symptom or complaint.  Did use hydrogen peroxide x 1 without improvement in symptoms.  No other OTC meds for symptom management.  No other complaints.      Vitals:    24 1613   BP: 131/78   Pulse: 61   Resp: 18   Temp: 36.8 °C (98.2 °F)   SpO2: 96%       Allergies   Allergen Reactions    Penicillins Swelling and Other       Medication Documentation Review Audit       Reviewed by Jesika Felder MA (Medical Assistant) on 24 at 1613      Medication Order Taking? Sig Documenting Provider Last Dose Status   amLODIPine (Norvasc) 10 mg tablet [id number] Yes Take 1 tablet (10 mg) by mouth once daily. Dave Galindo MD MPH Taking Active   aspirin 81 mg EC tablet [id number] Yes TAKE 1 TABLET BY MOUTH ONCE DAILY. Alexis Stewart MD Taking Active   carvedilol (Coreg) 12.5 mg tablet [id number] Yes Take 1 tablet (12.5 mg) by mouth 2 times a day with meals. Dave Galindo MD MPH Taking Active   chlorthalidone (Hygroton) 25 mg tablet [id number] Yes Take 1 tablet (25 mg) by mouth once daily. Dave Galindo MD MPH Taking Active   cyclobenzaprine (Flexeril) 10 mg tablet [id number]  Take 1 tablet (10 mg) by mouth 3 times a day as needed for muscle spasms for up to 10 days. Alexis Stewart MD   03/10/24 6833   ibuprofen-diphenhydramine HCl (Ibuprofen PM) 200-25 mg capsule per capsule [id number] Yes Take by mouth. Historical Provider, MD Taking Active                    Past Medical History:   Diagnosis Date    Hypertension        Past Surgical History:   Procedure Laterality Date    OTHER SURGICAL HISTORY  2019    Appendectomy    SHOULDER SURGERY Left     TOTAL HIP ARTHROPLASTY Left        ROS  See HPI    Physical Exam  Vitals and nursing note  reviewed.   Constitutional:       General: He is not in acute distress.     Appearance: Normal appearance. He is not ill-appearing or toxic-appearing.   HENT:      Right Ear: Hearing and tympanic membrane normal. There is impacted cerumen.      Left Ear: Hearing, tympanic membrane, ear canal and external ear normal.      Ears:      Comments: R EAC erythematous  Cardiovascular:      Rate and Rhythm: Normal rate and regular rhythm.   Skin:     General: Skin is warm and dry.   Neurological:      General: No focal deficit present.      Mental Status: He is alert and oriented to person, place, and time.   Psychiatric:         Mood and Affect: Mood normal.         Behavior: Behavior normal.         Thought Content: Thought content normal.         Judgment: Judgment normal.       Procedure:  R ear irrigation with 50/50 hydrogen peroxide/tepid tap water with moderate amount of cerumen removed. Slightly erythematous R EAC post pocedure with TM intact without erythema. No purulent drainage noted. Pt tolerated well.      Assessment/Plan/MDM  Jesus was seen today for ear fullness.  Diagnoses and all orders for this visit:  Impacted cerumen of right ear (Primary)  -     Ear Cerumen Removal  Other infective acute otitis externa of right ear  -     ofloxacin (Floxin) 0.3 % otic solution; Administer 5 drops into the right ear 2 times a day for 3 days.    Patient's clinical presentation is otherwise unremarkable at this time. Patient is discharged with instructions to follow-up with primary care or seek emergency medical attention for worsening symptoms or any new concerns.    I did personally review Jeuss's past medical history, surgical history, social history, as well as family history (when relevant).  In this case, I also oversaw the his drug management by reviewing his medication list, allergy list, as well as the medications that I prescribed during the UC course and/or recommended as an out-patient (including possible OTC  medications such as acetaminophen, NSAIDs , etc).    After reviewing the items above, I did not look at previous medical documentation, such as recent hospitalizations, office visits, and/or recent consultations with PCP/specialist.                          SDOH:   Another factor that I considered in Jesus's care was his Social Determinants of Health (SDOH). During this UC encounter, he did not have social determinants of health. Those SDOH influencing Jesus's care are: none      Kenji Arambula CNP  [facility]  [phone]

## 2024-07-03 ENCOUNTER — HOSPITAL ENCOUNTER (EMERGENCY)
Facility: HOSPITAL | Age: 64
Discharge: HOME | End: 2024-07-03
Attending: EMERGENCY MEDICINE
Payer: COMMERCIAL

## 2024-07-03 ENCOUNTER — APPOINTMENT (OUTPATIENT)
Dept: RADIOLOGY | Facility: HOSPITAL | Age: 64
End: 2024-07-03
Payer: COMMERCIAL

## 2024-07-03 ENCOUNTER — HOSPITAL ENCOUNTER (OUTPATIENT)
Dept: CARDIOLOGY | Facility: HOSPITAL | Age: 64
Discharge: HOME | End: 2024-07-03
Payer: COMMERCIAL

## 2024-07-03 VITALS
BODY MASS INDEX: 29.8 KG/M2 | RESPIRATION RATE: 18 BRPM | OXYGEN SATURATION: 97 % | SYSTOLIC BLOOD PRESSURE: 137 MMHG | DIASTOLIC BLOOD PRESSURE: 76 MMHG | TEMPERATURE: 98.4 F | HEIGHT: 72 IN | HEART RATE: 79 BPM | WEIGHT: 220 LBS

## 2024-07-03 DIAGNOSIS — K52.9 GASTROENTERITIS: ICD-10-CM

## 2024-07-03 DIAGNOSIS — K50.00: Primary | ICD-10-CM

## 2024-07-03 LAB
ALBUMIN SERPL BCP-MCNC: 4.7 G/DL (ref 3.4–5)
ALP SERPL-CCNC: 64 U/L (ref 33–136)
ALT SERPL W P-5'-P-CCNC: 13 U/L (ref 10–52)
ANION GAP SERPL CALC-SCNC: 23 MMOL/L (ref 10–20)
APPEARANCE UR: CLEAR
AST SERPL W P-5'-P-CCNC: 14 U/L (ref 9–39)
BASOPHILS # BLD AUTO: 0.03 X10*3/UL (ref 0–0.1)
BASOPHILS NFR BLD AUTO: 0.2 %
BILIRUB DIRECT SERPL-MCNC: 0.2 MG/DL (ref 0–0.3)
BILIRUB SERPL-MCNC: 1.5 MG/DL (ref 0–1.2)
BILIRUB UR STRIP.AUTO-MCNC: NEGATIVE MG/DL
BUN SERPL-MCNC: 19 MG/DL (ref 6–23)
CALCIUM SERPL-MCNC: 9.5 MG/DL (ref 8.6–10.3)
CHLORIDE SERPL-SCNC: 96 MMOL/L (ref 98–107)
CO2 SERPL-SCNC: 17 MMOL/L (ref 21–32)
COLOR UR: ABNORMAL
CREAT SERPL-MCNC: 1.02 MG/DL (ref 0.5–1.3)
EGFRCR SERPLBLD CKD-EPI 2021: 83 ML/MIN/1.73M*2
EOSINOPHIL # BLD AUTO: 0.03 X10*3/UL (ref 0–0.7)
EOSINOPHIL NFR BLD AUTO: 0.2 %
ERYTHROCYTE [DISTWIDTH] IN BLOOD BY AUTOMATED COUNT: 13 % (ref 11.5–14.5)
FLUAV RNA RESP QL NAA+PROBE: NOT DETECTED
FLUBV RNA RESP QL NAA+PROBE: NOT DETECTED
GLUCOSE SERPL-MCNC: 163 MG/DL (ref 74–99)
GLUCOSE UR STRIP.AUTO-MCNC: NORMAL MG/DL
HCT VFR BLD AUTO: 42.7 % (ref 41–52)
HGB BLD-MCNC: 15.1 G/DL (ref 13.5–17.5)
HOLD SPECIMEN: NORMAL
IMM GRANULOCYTES # BLD AUTO: 0.03 X10*3/UL (ref 0–0.7)
IMM GRANULOCYTES NFR BLD AUTO: 0.2 % (ref 0–0.9)
KETONES UR STRIP.AUTO-MCNC: ABNORMAL MG/DL
LACTATE SERPL-SCNC: 0.6 MMOL/L (ref 0.4–2)
LACTATE SERPL-SCNC: 2.1 MMOL/L (ref 0.4–2)
LACTATE SERPL-SCNC: 4.1 MMOL/L (ref 0.4–2)
LEUKOCYTE ESTERASE UR QL STRIP.AUTO: NEGATIVE
LIPASE SERPL-CCNC: 7 U/L (ref 9–82)
LYMPHOCYTES # BLD AUTO: 0.7 X10*3/UL (ref 1.2–4.8)
LYMPHOCYTES NFR BLD AUTO: 5.8 %
MCH RBC QN AUTO: 30 PG (ref 26–34)
MCHC RBC AUTO-ENTMCNC: 35.4 G/DL (ref 32–36)
MCV RBC AUTO: 85 FL (ref 80–100)
MONOCYTES # BLD AUTO: 0.49 X10*3/UL (ref 0.1–1)
MONOCYTES NFR BLD AUTO: 4 %
MUCOUS THREADS #/AREA URNS AUTO: ABNORMAL /LPF
NEUTROPHILS # BLD AUTO: 10.86 X10*3/UL (ref 1.2–7.7)
NEUTROPHILS NFR BLD AUTO: 89.6 %
NITRITE UR QL STRIP.AUTO: NEGATIVE
NRBC BLD-RTO: 0 /100 WBCS (ref 0–0)
PH UR STRIP.AUTO: 7 [PH]
PLATELET # BLD AUTO: 339 X10*3/UL (ref 150–450)
POTASSIUM SERPL-SCNC: 3 MMOL/L (ref 3.5–5.3)
PROT SERPL-MCNC: 7.7 G/DL (ref 6.4–8.2)
PROT UR STRIP.AUTO-MCNC: ABNORMAL MG/DL
RBC # BLD AUTO: 5.03 X10*6/UL (ref 4.5–5.9)
RBC # UR STRIP.AUTO: ABNORMAL /UL
RBC #/AREA URNS AUTO: ABNORMAL /HPF
SARS-COV-2 RNA RESP QL NAA+PROBE: NOT DETECTED
SODIUM SERPL-SCNC: 133 MMOL/L (ref 136–145)
SP GR UR STRIP.AUTO: 1.03
UROBILINOGEN UR STRIP.AUTO-MCNC: NORMAL MG/DL
WBC # BLD AUTO: 12.1 X10*3/UL (ref 4.4–11.3)
WBC #/AREA URNS AUTO: ABNORMAL /HPF

## 2024-07-03 PROCEDURE — 87040 BLOOD CULTURE FOR BACTERIA: CPT | Mod: SAMLAB | Performed by: EMERGENCY MEDICINE

## 2024-07-03 PROCEDURE — 2500000004 HC RX 250 GENERAL PHARMACY W/ HCPCS (ALT 636 FOR OP/ED)

## 2024-07-03 PROCEDURE — 96365 THER/PROPH/DIAG IV INF INIT: CPT | Mod: 59

## 2024-07-03 PROCEDURE — 87502 INFLUENZA DNA AMP PROBE: CPT | Performed by: EMERGENCY MEDICINE

## 2024-07-03 PROCEDURE — 36415 COLL VENOUS BLD VENIPUNCTURE: CPT | Performed by: EMERGENCY MEDICINE

## 2024-07-03 PROCEDURE — 96361 HYDRATE IV INFUSION ADD-ON: CPT

## 2024-07-03 PROCEDURE — 83690 ASSAY OF LIPASE: CPT | Performed by: EMERGENCY MEDICINE

## 2024-07-03 PROCEDURE — 80048 BASIC METABOLIC PNL TOTAL CA: CPT | Performed by: EMERGENCY MEDICINE

## 2024-07-03 PROCEDURE — 2500000004 HC RX 250 GENERAL PHARMACY W/ HCPCS (ALT 636 FOR OP/ED): Performed by: EMERGENCY MEDICINE

## 2024-07-03 PROCEDURE — 2500000001 HC RX 250 WO HCPCS SELF ADMINISTERED DRUGS (ALT 637 FOR MEDICARE OP): Performed by: EMERGENCY MEDICINE

## 2024-07-03 PROCEDURE — 2550000001 HC RX 255 CONTRASTS: Performed by: EMERGENCY MEDICINE

## 2024-07-03 PROCEDURE — 96366 THER/PROPH/DIAG IV INF ADDON: CPT

## 2024-07-03 PROCEDURE — 85025 COMPLETE CBC W/AUTO DIFF WBC: CPT | Performed by: EMERGENCY MEDICINE

## 2024-07-03 PROCEDURE — 74177 CT ABD & PELVIS W/CONTRAST: CPT | Mod: FOREIGN READ | Performed by: RADIOLOGY

## 2024-07-03 PROCEDURE — 96376 TX/PRO/DX INJ SAME DRUG ADON: CPT

## 2024-07-03 PROCEDURE — 93005 ELECTROCARDIOGRAM TRACING: CPT

## 2024-07-03 PROCEDURE — 74177 CT ABD & PELVIS W/CONTRAST: CPT

## 2024-07-03 PROCEDURE — 96375 TX/PRO/DX INJ NEW DRUG ADDON: CPT

## 2024-07-03 PROCEDURE — 83605 ASSAY OF LACTIC ACID: CPT | Performed by: EMERGENCY MEDICINE

## 2024-07-03 PROCEDURE — 84075 ASSAY ALKALINE PHOSPHATASE: CPT | Performed by: EMERGENCY MEDICINE

## 2024-07-03 PROCEDURE — 81001 URINALYSIS AUTO W/SCOPE: CPT | Performed by: EMERGENCY MEDICINE

## 2024-07-03 PROCEDURE — 99285 EMERGENCY DEPT VISIT HI MDM: CPT | Mod: 25

## 2024-07-03 PROCEDURE — 71045 X-RAY EXAM CHEST 1 VIEW: CPT

## 2024-07-03 PROCEDURE — 71045 X-RAY EXAM CHEST 1 VIEW: CPT | Mod: FOREIGN READ | Performed by: RADIOLOGY

## 2024-07-03 RX ORDER — MORPHINE SULFATE 4 MG/ML
4 INJECTION, SOLUTION INTRAMUSCULAR; INTRAVENOUS ONCE
Status: COMPLETED | OUTPATIENT
Start: 2024-07-03 | End: 2024-07-03

## 2024-07-03 RX ORDER — ONDANSETRON HYDROCHLORIDE 2 MG/ML
INJECTION, SOLUTION INTRAVENOUS
Status: COMPLETED
Start: 2024-07-03 | End: 2024-07-03

## 2024-07-03 RX ORDER — FAMOTIDINE 20 MG/1
20 TABLET, FILM COATED ORAL DAILY
Qty: 10 TABLET | Refills: 0 | Status: SHIPPED | OUTPATIENT
Start: 2024-07-03 | End: 2024-07-13

## 2024-07-03 RX ORDER — MORPHINE SULFATE 4 MG/ML
INJECTION, SOLUTION INTRAMUSCULAR; INTRAVENOUS
Status: COMPLETED
Start: 2024-07-03 | End: 2024-07-03

## 2024-07-03 RX ORDER — POTASSIUM CHLORIDE 14.9 MG/ML
20 INJECTION INTRAVENOUS ONCE
Status: COMPLETED | OUTPATIENT
Start: 2024-07-03 | End: 2024-07-03

## 2024-07-03 RX ORDER — ONDANSETRON 4 MG/1
4 TABLET, ORALLY DISINTEGRATING ORAL EVERY 8 HOURS PRN
Qty: 12 TABLET | Refills: 0 | Status: SHIPPED | OUTPATIENT
Start: 2024-07-03 | End: 2024-07-06

## 2024-07-03 RX ORDER — ALUMINUM HYDROXIDE, MAGNESIUM HYDROXIDE, AND SIMETHICONE 1200; 120; 1200 MG/30ML; MG/30ML; MG/30ML
30 SUSPENSION ORAL ONCE
Status: COMPLETED | OUTPATIENT
Start: 2024-07-03 | End: 2024-07-03

## 2024-07-03 RX ORDER — ONDANSETRON HYDROCHLORIDE 2 MG/ML
4 INJECTION, SOLUTION INTRAVENOUS ONCE
Status: COMPLETED | OUTPATIENT
Start: 2024-07-03 | End: 2024-07-03

## 2024-07-03 RX ORDER — ONDANSETRON HYDROCHLORIDE 2 MG/ML
8 INJECTION, SOLUTION INTRAVENOUS ONCE
Status: COMPLETED | OUTPATIENT
Start: 2024-07-03 | End: 2024-07-03

## 2024-07-03 RX ORDER — LIDOCAINE HYDROCHLORIDE 20 MG/ML
15 SOLUTION OROPHARYNGEAL ONCE
Status: COMPLETED | OUTPATIENT
Start: 2024-07-03 | End: 2024-07-03

## 2024-07-03 RX ORDER — SODIUM CHLORIDE 9 MG/ML
250 INJECTION, SOLUTION INTRAVENOUS CONTINUOUS
Status: DISCONTINUED | OUTPATIENT
Start: 2024-07-03 | End: 2024-07-03 | Stop reason: HOSPADM

## 2024-07-03 ASSESSMENT — ENCOUNTER SYMPTOMS
COUGH: 0
CHILLS: 1
DIAPHORESIS: 1
SHORTNESS OF BREATH: 0
PALPITATIONS: 0
HEMATOLOGIC/LYMPHATIC NEGATIVE: 1
SORE THROAT: 0
EYES NEGATIVE: 1
FATIGUE: 1
DYSURIA: 0
ABDOMINAL PAIN: 1
NECK PAIN: 0
HEADACHES: 0
NECK STIFFNESS: 0
DIZZINESS: 0
HEMATURIA: 0
NAUSEA: 1
VOMITING: 1
FEVER: 1
MYALGIAS: 1
PSYCHIATRIC NEGATIVE: 1

## 2024-07-03 ASSESSMENT — PAIN DESCRIPTION - ORIENTATION: ORIENTATION: UPPER;LEFT

## 2024-07-03 ASSESSMENT — PAIN DESCRIPTION - DESCRIPTORS: DESCRIPTORS: PRESSURE

## 2024-07-03 ASSESSMENT — PAIN DESCRIPTION - PAIN TYPE: TYPE: ACUTE PAIN

## 2024-07-03 ASSESSMENT — PAIN SCALES - GENERAL
PAINLEVEL_OUTOF10: 0 - NO PAIN
PAINLEVEL_OUTOF10: 7
PAINLEVEL_OUTOF10: 4
PAINLEVEL_OUTOF10: 8
PAINLEVEL_OUTOF10: 8

## 2024-07-03 ASSESSMENT — PAIN DESCRIPTION - FREQUENCY: FREQUENCY: CONSTANT/CONTINUOUS

## 2024-07-03 ASSESSMENT — PAIN DESCRIPTION - ONSET: ONSET: PROGRESSIVE

## 2024-07-03 ASSESSMENT — PAIN - FUNCTIONAL ASSESSMENT
PAIN_FUNCTIONAL_ASSESSMENT: 0-10
PAIN_FUNCTIONAL_ASSESSMENT: 0-10

## 2024-07-03 ASSESSMENT — PAIN DESCRIPTION - PROGRESSION: CLINICAL_PROGRESSION: GRADUALLY IMPROVING

## 2024-07-03 ASSESSMENT — PAIN DESCRIPTION - LOCATION: LOCATION: ABDOMEN

## 2024-07-03 NOTE — ED PROVIDER NOTES
Chief Complaint: ABD PAIN.NAUSEA/VOMITING    This is a 64-year-old male who went to work yesterday who got sick in the morning and had nausea and vomiting came home and rested till about 1030 went back to work and continued to have nausea vomiting and came home at 3:00 since that time has been unable to keep any food or liquids then and has not been able to keep his blood pressure medicines in either.  He complains of nausea vomiting and some lower abdominal pain mostly in the right side he has had a previous appendectomy.  He has no history of cholecystitis or diverticulitis no history of renal calculi he has had chills and rigors also at home.  Denies any swelling of his legs no rashes had some myalgias also.  Patient had a total of 2 L of normal saline and then normal saline to 50 cc/h had a total of 12 mg of Zofran as well as a GI cocktail his symptoms completely resolved he was able to keep ice chips and had no pain or nausea at thereafter.  The CT scan shows a jejunal enteritis but no obstruction otherwise.  His initial lactate was 4.1.  It was repeated was down to 2.1.  Final was 6.6.  Urinalysis with some ketones which is consistent with mild dehydration influenza AMB and COVID were all negative his metabolic panel showed a BUN of 23 and a bicarb of 17 which is consistent with dehydration liver function testing was normal white blood count was slightly elevated at 12.1.  Patient be treated symptomatically as an outpatient with Pepcid and Zofran is to follow-up with his primary care physician for possible GI referral if necessary.           Review of Systems   Constitutional:  Positive for chills, diaphoresis, fatigue and fever.   HENT:  Negative for congestion and sore throat.    Eyes: Negative.    Respiratory:  Negative for cough and shortness of breath.    Cardiovascular:  Negative for chest pain, palpitations and leg swelling.   Gastrointestinal:  Positive for abdominal pain, nausea and vomiting.    Genitourinary:  Negative for dysuria, hematuria and urgency.   Musculoskeletal:  Positive for myalgias. Negative for neck pain and neck stiffness.   Skin:  Negative for rash.   Neurological:  Negative for dizziness and headaches.   Hematological: Negative.    Psychiatric/Behavioral: Negative.          Physical Exam  Vitals reviewed.   Constitutional:       General: He is not in acute distress.     Appearance: He is ill-appearing. He is not toxic-appearing.      Comments: Appears ill but is afebrile he is not toxic in appearance   HENT:      Head: Normocephalic and atraumatic.      Right Ear: Tympanic membrane normal.      Left Ear: Tympanic membrane normal.      Nose: Nose normal. No congestion or rhinorrhea.      Mouth/Throat:      Mouth: Mucous membranes are dry.      Pharynx: Oropharynx is clear. No oropharyngeal exudate or posterior oropharyngeal erythema.   Eyes:      Extraocular Movements: Extraocular movements intact.      Conjunctiva/sclera: Conjunctivae normal.      Pupils: Pupils are equal, round, and reactive to light.   Cardiovascular:      Rate and Rhythm: Normal rate.      Pulses: Normal pulses.      Heart sounds: No murmur heard.  Pulmonary:      Effort: Pulmonary effort is normal. No respiratory distress.   Abdominal:      General: There is no distension.      Palpations: There is no mass.      Tenderness: There is abdominal tenderness. There is no right CVA tenderness or left CVA tenderness.      Hernia: No hernia is present.      Comments: Patient with some suprapubic right lower quadrant tenderness bowel sounds are diminished there is no rebound or guarding no hernias noted   Musculoskeletal:         General: No swelling or tenderness. Normal range of motion.      Cervical back: Normal range of motion and neck supple. No rigidity or tenderness.      Right lower leg: No edema.      Left lower leg: No edema.   Skin:     General: Skin is warm.      Capillary Refill: Capillary refill takes less than  2 seconds.   Neurological:      General: No focal deficit present.      Mental Status: He is alert and oriented to person, place, and time.   Psychiatric:         Mood and Affect: Mood normal.         Behavior: Behavior normal.          Labs Reviewed   CBC WITH AUTO DIFFERENTIAL - Abnormal       Result Value    WBC 12.1 (*)     nRBC 0.0      RBC 5.03      Hemoglobin 15.1      Hematocrit 42.7      MCV 85      MCH 30.0      MCHC 35.4      RDW 13.0      Platelets 339      Neutrophils % 89.6      Immature Granulocytes %, Automated 0.2      Lymphocytes % 5.8      Monocytes % 4.0      Eosinophils % 0.2      Basophils % 0.2      Neutrophils Absolute 10.86 (*)     Immature Granulocytes Absolute, Automated 0.03      Lymphocytes Absolute 0.70 (*)     Monocytes Absolute 0.49      Eosinophils Absolute 0.03      Basophils Absolute 0.03     BASIC METABOLIC PANEL - Abnormal    Glucose 163 (*)     Sodium 133 (*)     Potassium 3.0 (*)     Chloride 96 (*)     Bicarbonate 17 (*)     Anion Gap 23 (*)     Urea Nitrogen 19      Creatinine 1.02      eGFR 83      Calcium 9.5     LIPASE - Abnormal    Lipase 7 (*)     Narrative:     Venipuncture immediately after or during the administration of Metamizole may lead to falsely low results. Testing should be performed immediately prior to Metamizole dosing.   HEPATIC FUNCTION PANEL - Abnormal    Albumin 4.7      Bilirubin, Total 1.5 (*)     Bilirubin, Direct 0.2      Alkaline Phosphatase 64      ALT 13      AST 14      Total Protein 7.7     LACTATE - Abnormal    Lactate 4.1 (*)     Narrative:     Venipuncture immediately after or during the administration of Metamizole may lead to falsely low results. Testing should be performed immediately  prior to Metamizole dosing.   URINALYSIS WITH REFLEX CULTURE AND MICROSCOPIC - Abnormal    Color, Urine Light-Yellow      Appearance, Urine Clear      Specific Gravity, Urine 1.034      pH, Urine 7.0      Protein, Urine 100 (2+) (*)     Glucose, Urine  Normal      Blood, Urine 0.06 (1+) (*)     Ketones, Urine 80 (3+) (*)     Bilirubin, Urine NEGATIVE      Urobilinogen, Urine Normal      Nitrite, Urine NEGATIVE      Leukocyte Esterase, Urine NEGATIVE     LACTATE - Abnormal    Lactate 2.1 (*)     Narrative:     Venipuncture immediately after or during the administration of Metamizole may lead to falsely low results. Testing should be performed immediately  prior to Metamizole dosing.   URINALYSIS MICROSCOPIC WITH REFLEX CULTURE - Abnormal    WBC, Urine 1-5      RBC, Urine 6-10 (*)     Mucus, Urine FEW     INFLUENZA A AND B PCR - Normal    Flu A Result Not Detected      Flu B Result Not Detected      Narrative:     This assay is an in vitro diagnostic multiplex nucleic acid amplification test for the detection and discrimination of Influenza A & B from nasopharyngeal specimens, and has been validated for use at [facility]. Negative results do not preclude Influenza A/B infections, and should not be used as the sole basis for diagnosis, treatment, or other management decisions. If Influenza A/B and RSV PCR results are negative, testing for Parainfluenza virus, Adenovirus and Metapneumovirus is routinely performed for [facility] pediatric oncology and intensive care inpatients, and is available on other patients by placing an add-on request.   SARS-COV-2 PCR - Normal    Coronavirus 2019, PCR Not Detected      Narrative:     This assay has received FDA Emergency Use Authorization (EUA) and is only authorized for the duration of time that circumstances exist to justify the authorization of the emergency use of in vitro diagnostic tests for the detection of SARS-CoV-2 virus and/or diagnosis of COVID-19 infection under section 564(b)(1) of the Act, 21 U.S.C. 360bbb-3(b)(1). This assay is an in vitro diagnostic nucleic acid amplification test for the qualitative detection of SARS-CoV-2 from nasopharyngeal specimens and has been validated for use at  [facility]. Negative results do not preclude COVID-19 infections and should not be used as the sole basis for diagnosis, treatment, or other management decisions.     LACTATE - Normal    Lactate 0.6      Narrative:     Venipuncture immediately after or during the administration of Metamizole may lead to falsely low results. Testing should be performed immediately  prior to Metamizole dosing.   BLOOD CULTURE   BLOOD CULTURE   GRAY TOP    Extra Tube Hold for add-ons.     URINALYSIS WITH REFLEX CULTURE AND MICROSCOPIC    Narrative:     The following orders were created for panel order Urinalysis with Reflex Culture and Microscopic.  Procedure                               Abnormality         Status                     ---------                               -----------         ------                     Urinalysis with Reflex C...[[id number]]  Abnormal            Final result               Extra Urine Gray Tube[[id number]]                                                         Please view results for these tests on the individual orders.   EXTRA URINE GRAY TUBE        CT abdomen pelvis w IV contrast   Final Result   Several loops of jejunum appear mildly thickened raising the question   of enteritis.  No ascites, free air or bowel obstruction.  No urinary   tract calculus or hydronephrosis.   Signed by Jerrell Lutz MD      XR chest 1 view   Final Result   No radiographic evidence of acute cardiopulmonary disease.   Signed by Mario Alberto Alaniz MD           Procedures     Medical Decision Making  DIFFERENTIAL diagnosis included influenza COVID-19 viral gastroenteritis bowel obstruction renal calculi pyelonephritis.  Appropriate labs including lactate were ordered and urinalysis.  Patient was ordered morphine sulfate 4 mg IV for pain and Zofran 8 mg intravenously for nausea 1 L of normal saline and normal saline infusion at 250 cc/h and CT scan of the abdomen pelvis at this time    Amount and/or  Complexity of Data Reviewed  ECG/medicine tests: ordered and independent interpretation performed.     Details: Twelve-lead EKG showed sinus rhythm at 96/min there is left atrial enlargement left axis deviation but no acute ST segment elevations or depressions         Diagnoses as of 07/03/24 1311   Regional enteritis of jejunum without complication (Multi)   Gastroenteritis                    David Rojas MD  07/03/24 1311

## 2024-07-03 NOTE — DISCHARGE INSTRUCTIONS
CLEAR LIQUIDS ADVANCE TO BRAT DIET  PEPCID FOR STOMACH ACID  ZOFRAN FOR NAUSEA  CALL YOU PCP FOR FOLLOW UP ?? GI REFERRAL

## 2024-07-05 LAB
ATRIAL RATE: 96 BPM
P AXIS: 72 DEGREES
P OFFSET: 200 MS
P ONSET: 149 MS
PR INTERVAL: 124 MS
Q ONSET: 211 MS
QRS COUNT: 16 BEATS
QRS DURATION: 94 MS
QT INTERVAL: 372 MS
QTC CALCULATION(BAZETT): 469 MS
QTC FREDERICIA: 435 MS
R AXIS: -49 DEGREES
T AXIS: 50 DEGREES
T OFFSET: 397 MS
VENTRICULAR RATE: 96 BPM

## 2024-07-07 LAB
BACTERIA BLD CULT: NORMAL
BACTERIA BLD CULT: NORMAL

## 2024-07-30 ENCOUNTER — APPOINTMENT (OUTPATIENT)
Dept: ORTHOPEDIC SURGERY | Facility: CLINIC | Age: 64
End: 2024-07-30
Payer: COMMERCIAL

## 2024-07-30 ENCOUNTER — HOSPITAL ENCOUNTER (OUTPATIENT)
Dept: RADIOLOGY | Facility: EXTERNAL LOCATION | Age: 64
Discharge: HOME | End: 2024-07-30

## 2024-07-30 ENCOUNTER — HOSPITAL ENCOUNTER (OUTPATIENT)
Dept: RADIOLOGY | Facility: CLINIC | Age: 64
Discharge: HOME | End: 2024-07-30
Payer: COMMERCIAL

## 2024-07-30 DIAGNOSIS — M16.12 PRIMARY OSTEOARTHRITIS OF LEFT HIP: ICD-10-CM

## 2024-07-30 PROCEDURE — 1036F TOBACCO NON-USER: CPT | Performed by: SPECIALIST

## 2024-07-30 PROCEDURE — 73502 X-RAY EXAM HIP UNI 2-3 VIEWS: CPT | Mod: LT

## 2024-07-30 PROCEDURE — 73502 X-RAY EXAM HIP UNI 2-3 VIEWS: CPT | Mod: LEFT SIDE | Performed by: RADIOLOGY

## 2024-07-30 PROCEDURE — 99214 OFFICE O/P EST MOD 30 MIN: CPT | Performed by: SPECIALIST

## 2024-07-30 ASSESSMENT — PAIN - FUNCTIONAL ASSESSMENT: PAIN_FUNCTIONAL_ASSESSMENT: NO/DENIES PAIN

## 2024-07-30 NOTE — ASSESSMENT & PLAN NOTE
5 months and 4 days status post left total hip arthroplasty.  Patient comes in today stating that he is returned to activities of daily living and is doing quite well.  He has no pain.  Other than the hip precautions he has no restrictions to his activities.  He is very pleased with his early outcome.    Plan:  I reiterated his dental precautions and the need for antibiotics.  I reiterated the hip precautions which he does still need to be cognizant of and careful with.  Follow-up in 6 months for reevaluation with new x-rays of the left hip.  I reviewed the plain x-rays which show the hip to be in a good position.  There is no signs of loosening.

## 2024-07-30 NOTE — PROGRESS NOTES
Assessment/Plan   Encounter Diagnoses:  Primary osteoarthritis of left hip  Primary osteoarthritis of left hip  5 months and 4 days status post left total hip arthroplasty.  Patient comes in today stating that he is returned to activities of daily living and is doing quite well.  He has no pain.  Other than the hip precautions he has no restrictions to his activities.  He is very pleased with his early outcome.    Plan:  I reiterated his dental precautions and the need for antibiotics.  I reiterated the hip precautions which he does still need to be cognizant of and careful with.  Follow-up in 6 months for reevaluation with new x-rays of the left hip.  I reviewed the plain x-rays which show the hip to be in a good position.  There is no signs of loosening.       Subjective    Patient ID: Jesus Rand is a 64 y.o. male.    Chief Complaint: Post-op of the Left Hip (6 MONTH POST OP /X-RAYS ORDERED )     Last Surgery: Arthroplasty Total Hip Posterior Approach - Left  Last Surgery Date: 2/26/2024    HPI  64-year-old who is 5 months and 4 days status post left total hip arthroplasty.  He has returned to activities of daily living without any restrictions other than the posterior hip dislocation protocol.  He is very pleased with his progress and does not feel like he is limping or having any significant sequelae from the surgery.    OBJECTIVE: ORTHO EXAM  Left hip exam  Posterior incision is healed well.  Gentle range of motion with full and nontender.  His leg lengths are equal.  He ambulates with a very subtle pelvic hitch but a negative Trendelenburg.  He does not have any antalgia with his gait.  His calves were supple and nontender bilaterally.  He is neurovascularly intact distally.    IMAGE RESULTS:  Point of Care Ultrasound  These images are not reportable by radiology and will not be interpreted   by  Radiologists.      ULTRASOUND      Procedures     Orders Placed This Encounter    XR hip left with pelvis when  performed 2 or 3 views    Point of Care Ultrasound

## 2024-08-05 ENCOUNTER — HOSPITAL ENCOUNTER (EMERGENCY)
Facility: HOSPITAL | Age: 64
Discharge: HOME | End: 2024-08-05
Attending: EMERGENCY MEDICINE
Payer: COMMERCIAL

## 2024-08-05 ENCOUNTER — APPOINTMENT (OUTPATIENT)
Dept: RADIOLOGY | Facility: HOSPITAL | Age: 64
End: 2024-08-05
Payer: COMMERCIAL

## 2024-08-05 VITALS
OXYGEN SATURATION: 94 % | DIASTOLIC BLOOD PRESSURE: 89 MMHG | SYSTOLIC BLOOD PRESSURE: 139 MMHG | TEMPERATURE: 98.3 F | HEART RATE: 68 BPM | RESPIRATION RATE: 18 BRPM | HEIGHT: 72 IN | BODY MASS INDEX: 29.53 KG/M2 | WEIGHT: 218 LBS

## 2024-08-05 DIAGNOSIS — R10.84 GENERALIZED ABDOMINAL PAIN: Primary | ICD-10-CM

## 2024-08-05 LAB
ALBUMIN SERPL BCP-MCNC: 4.3 G/DL (ref 3.4–5)
ALP SERPL-CCNC: 54 U/L (ref 33–136)
ALT SERPL W P-5'-P-CCNC: 12 U/L (ref 10–52)
ANION GAP SERPL CALC-SCNC: 18 MMOL/L (ref 10–20)
APPEARANCE UR: CLEAR
AST SERPL W P-5'-P-CCNC: 15 U/L (ref 9–39)
BASOPHILS # BLD AUTO: 0.03 X10*3/UL (ref 0–0.1)
BASOPHILS NFR BLD AUTO: 0.2 %
BILIRUB SERPL-MCNC: 1.3 MG/DL (ref 0–1.2)
BILIRUB UR STRIP.AUTO-MCNC: NEGATIVE MG/DL
BUN SERPL-MCNC: 25 MG/DL (ref 6–23)
CALCIUM SERPL-MCNC: 9.3 MG/DL (ref 8.6–10.3)
CHLORIDE SERPL-SCNC: 106 MMOL/L (ref 98–107)
CO2 SERPL-SCNC: 18 MMOL/L (ref 21–32)
COLOR UR: ABNORMAL
CREAT SERPL-MCNC: 0.99 MG/DL (ref 0.5–1.3)
EGFRCR SERPLBLD CKD-EPI 2021: 86 ML/MIN/1.73M*2
EOSINOPHIL # BLD AUTO: 0 X10*3/UL (ref 0–0.7)
EOSINOPHIL NFR BLD AUTO: 0 %
ERYTHROCYTE [DISTWIDTH] IN BLOOD BY AUTOMATED COUNT: 13.4 % (ref 11.5–14.5)
GLUCOSE SERPL-MCNC: 160 MG/DL (ref 74–99)
GLUCOSE UR STRIP.AUTO-MCNC: ABNORMAL MG/DL
HCT VFR BLD AUTO: 38.4 % (ref 41–52)
HGB BLD-MCNC: 13 G/DL (ref 13.5–17.5)
IMM GRANULOCYTES # BLD AUTO: 0.05 X10*3/UL (ref 0–0.7)
IMM GRANULOCYTES NFR BLD AUTO: 0.4 % (ref 0–0.9)
KETONES UR STRIP.AUTO-MCNC: ABNORMAL MG/DL
LEUKOCYTE ESTERASE UR QL STRIP.AUTO: NEGATIVE
LIPASE SERPL-CCNC: 7 U/L (ref 9–82)
LYMPHOCYTES # BLD AUTO: 0.57 X10*3/UL (ref 1.2–4.8)
LYMPHOCYTES NFR BLD AUTO: 4.6 %
MCH RBC QN AUTO: 29.3 PG (ref 26–34)
MCHC RBC AUTO-ENTMCNC: 33.9 G/DL (ref 32–36)
MCV RBC AUTO: 87 FL (ref 80–100)
MONOCYTES # BLD AUTO: 0.56 X10*3/UL (ref 0.1–1)
MONOCYTES NFR BLD AUTO: 4.5 %
MUCOUS THREADS #/AREA URNS AUTO: NORMAL /LPF
NEUTROPHILS # BLD AUTO: 11.11 X10*3/UL (ref 1.2–7.7)
NEUTROPHILS NFR BLD AUTO: 90.3 %
NITRITE UR QL STRIP.AUTO: NEGATIVE
NRBC BLD-RTO: 0 /100 WBCS (ref 0–0)
PH UR STRIP.AUTO: 8 [PH]
PLATELET # BLD AUTO: 307 X10*3/UL (ref 150–450)
POTASSIUM SERPL-SCNC: 3.3 MMOL/L (ref 3.5–5.3)
PROT SERPL-MCNC: 7.1 G/DL (ref 6.4–8.2)
PROT UR STRIP.AUTO-MCNC: ABNORMAL MG/DL
RBC # BLD AUTO: 4.44 X10*6/UL (ref 4.5–5.9)
RBC # UR STRIP.AUTO: NEGATIVE /UL
RBC #/AREA URNS AUTO: NORMAL /HPF
SODIUM SERPL-SCNC: 139 MMOL/L (ref 136–145)
SP GR UR STRIP.AUTO: 1.02
UROBILINOGEN UR STRIP.AUTO-MCNC: NORMAL MG/DL
WBC # BLD AUTO: 12.3 X10*3/UL (ref 4.4–11.3)
WBC #/AREA URNS AUTO: NORMAL /HPF

## 2024-08-05 PROCEDURE — 74177 CT ABD & PELVIS W/CONTRAST: CPT

## 2024-08-05 PROCEDURE — 84075 ASSAY ALKALINE PHOSPHATASE: CPT | Performed by: EMERGENCY MEDICINE

## 2024-08-05 PROCEDURE — 99284 EMERGENCY DEPT VISIT MOD MDM: CPT | Mod: 25

## 2024-08-05 PROCEDURE — 85025 COMPLETE CBC W/AUTO DIFF WBC: CPT | Performed by: EMERGENCY MEDICINE

## 2024-08-05 PROCEDURE — 83690 ASSAY OF LIPASE: CPT | Performed by: EMERGENCY MEDICINE

## 2024-08-05 PROCEDURE — 2550000001 HC RX 255 CONTRASTS: Performed by: EMERGENCY MEDICINE

## 2024-08-05 PROCEDURE — 96361 HYDRATE IV INFUSION ADD-ON: CPT

## 2024-08-05 PROCEDURE — 36415 COLL VENOUS BLD VENIPUNCTURE: CPT | Performed by: EMERGENCY MEDICINE

## 2024-08-05 PROCEDURE — 96375 TX/PRO/DX INJ NEW DRUG ADDON: CPT

## 2024-08-05 PROCEDURE — 74177 CT ABD & PELVIS W/CONTRAST: CPT | Mod: FOREIGN READ | Performed by: RADIOLOGY

## 2024-08-05 PROCEDURE — 81001 URINALYSIS AUTO W/SCOPE: CPT | Performed by: EMERGENCY MEDICINE

## 2024-08-05 PROCEDURE — 2500000004 HC RX 250 GENERAL PHARMACY W/ HCPCS (ALT 636 FOR OP/ED): Performed by: EMERGENCY MEDICINE

## 2024-08-05 PROCEDURE — 96374 THER/PROPH/DIAG INJ IV PUSH: CPT

## 2024-08-05 RX ORDER — ONDANSETRON HYDROCHLORIDE 2 MG/ML
4 INJECTION, SOLUTION INTRAVENOUS ONCE
Status: COMPLETED | OUTPATIENT
Start: 2024-08-05 | End: 2024-08-05

## 2024-08-05 RX ORDER — ONDANSETRON 4 MG/1
4 TABLET, ORALLY DISINTEGRATING ORAL EVERY 8 HOURS PRN
Qty: 30 TABLET | Refills: 0 | Status: SHIPPED | OUTPATIENT
Start: 2024-08-05

## 2024-08-05 RX ORDER — POTASSIUM CHLORIDE 20 MEQ/1
20 TABLET, EXTENDED RELEASE ORAL 2 TIMES DAILY
Qty: 20 TABLET | Refills: 0 | Status: SHIPPED | OUTPATIENT
Start: 2024-08-05 | End: 2024-08-15

## 2024-08-05 RX ORDER — DICYCLOMINE HYDROCHLORIDE 20 MG/1
20 TABLET ORAL
Qty: 30 TABLET | Refills: 0 | Status: SHIPPED | OUTPATIENT
Start: 2024-08-05

## 2024-08-05 ASSESSMENT — COLUMBIA-SUICIDE SEVERITY RATING SCALE - C-SSRS
6. HAVE YOU EVER DONE ANYTHING, STARTED TO DO ANYTHING, OR PREPARED TO DO ANYTHING TO END YOUR LIFE?: NO
2. HAVE YOU ACTUALLY HAD ANY THOUGHTS OF KILLING YOURSELF?: NO
1. IN THE PAST MONTH, HAVE YOU WISHED YOU WERE DEAD OR WISHED YOU COULD GO TO SLEEP AND NOT WAKE UP?: NO

## 2024-08-05 ASSESSMENT — PAIN SCALES - GENERAL
PAINLEVEL_OUTOF10: 2
PAINLEVEL_OUTOF10: 6

## 2024-08-05 ASSESSMENT — PAIN - FUNCTIONAL ASSESSMENT
PAIN_FUNCTIONAL_ASSESSMENT: 0-10
PAIN_FUNCTIONAL_ASSESSMENT: 0-10

## 2024-08-05 ASSESSMENT — PAIN DESCRIPTION - LOCATION
LOCATION: ABDOMEN
LOCATION: ABDOMEN

## 2024-08-05 ASSESSMENT — PAIN DESCRIPTION - ORIENTATION: ORIENTATION: LOWER

## 2024-08-05 ASSESSMENT — PAIN DESCRIPTION - PAIN TYPE: TYPE: ACUTE PAIN

## 2024-08-05 NOTE — ED PROVIDER NOTES
HPI   No chief complaint on file.      Limitations to History: None    HPI: 64-year-old male presents by EMS with concern for lower abdominal pain.  Began approximately 6 hours ago.  Achy in nature.  Admits to nausea and vomiting.  Denies any fever, chills, urinary symptoms.    Additional History Obtained from: EMS    ------------------------------------------------------------------------------------------------------------------------------------------  Physical Exam:    VS: As documented in the triage note and EMR flowsheet from this visit were reviewed.    Appearance: Alert. cooperative.  Distress secondary to pain and nausea.  Skin: Intact,  dry skin, no lesions, rash, petechiae or purpura.  Pale.  Eyes: PERRLA, EOMs intact,  Conjunctiva pink with no redness or exudates.   HENT: Normocephalic, atraumatic. Nares patent. No intraoral lesions.   Neck: Supple, without meningismus. Trachea at midline. No lymphadenopathy.  Pulmonary: Clear bilaterally with good chest wall excursion. No rales, rhonchi or wheezing. No accessory muscle use or stridor.  Cardiac: Regular rate and rhythm, no rubs, murmurs, or gallops.   Abdomen: Abdomen is soft.  Tenderness to palpation in the bilateral lower and mid abdomen.  Nondistended.  No palpable organomegaly.  No rebound or guarding.  No CVA tenderness. Nonsurgical abdomen.  Genitourinary: Exam deferred.  Musculoskeletal: Full range of motion.  Pulses full and equal. No cyanosis, clubbing, or edema.  Psychiatric: Appropriate mood and affect.                Patient History   Past Medical History:   Diagnosis Date    Hypertension      Past Surgical History:   Procedure Laterality Date    OTHER SURGICAL HISTORY  09/20/2019    Appendectomy    SHOULDER SURGERY Left     TOTAL HIP ARTHROPLASTY Left      No family history on file.  Social History     Tobacco Use    Smoking status: Never    Smokeless tobacco: Never   Vaping Use    Vaping status: Never Used   Substance Use Topics    Alcohol  use: Not Currently    Drug use: Never       Physical Exam   ED Triage Vitals   Temp Pulse Resp BP   -- -- -- --      SpO2 Temp src Heart Rate Source Patient Position   -- -- -- --      BP Location FiO2 (%)     -- --       Physical Exam      ED Course & MDM   Diagnoses as of 08/05/24 1838   Generalized abdominal pain                       No data recorded                      Medical Decision Making  Labs Reviewed  URINALYSIS WITH REFLEX MICROSCOPIC - Abnormal     Color, Urine                                         Appearance, Urine             Clear                  Specific Gravity, Urine       1.021                  pH, Urine                     8.0                    Protein, Urine                20 (TRACE)                Glucose, Urine                30 (TRACE) (*)               Blood, Urine                  NEGATIVE                Ketones, Urine                100 (3+) (*)               Bilirubin, Urine              NEGATIVE                Urobilinogen, Urine           Normal                 Nitrite, Urine                NEGATIVE                Leukocyte Esterase, Urine     NEGATIVE             LIPASE - Abnormal     Lipase                        7 (*)                      Narrative: Venipuncture immediately after or during the administration of Metamizole may lead to falsely low results. Testing should be performed immediately prior to Metamizole dosing.  COMPREHENSIVE METABOLIC PANEL - Abnormal     Glucose                       160 (*)                Sodium                        139                    Potassium                     3.3 (*)                Chloride                      106                    Bicarbonate                   18 (*)                 Anion Gap                     18                     Urea Nitrogen                 25 (*)                 Creatinine                    0.99                   eGFR                          86                     Calcium                       9.3                     Albumin                       4.3                    Alkaline Phosphatase          54                     Total Protein                 7.1                    AST                           15                     Bilirubin, Total              1.3 (*)                ALT                           12                  CBC WITH AUTO DIFFERENTIAL - Abnormal     WBC                           12.3 (*)               nRBC                          0.0                    RBC                           4.44 (*)               Hemoglobin                    13.0 (*)               Hematocrit                    38.4 (*)               MCV                           87                     MCH                           29.3                   MCHC                          33.9                   RDW                           13.4                   Platelets                     307                    Neutrophils %                 90.3                   Immature Granulocytes %, Automated   0.4                    Lymphocytes %                 4.6                    Monocytes %                   4.5                    Eosinophils %                 0.0                    Basophils %                   0.2                    Neutrophils Absolute          11.11 (*)               Immature Granulocytes Absolute, Au*   0.05                   Lymphocytes Absolute          0.57 (*)               Monocytes Absolute            0.56                   Eosinophils Absolute          0.00                   Basophils Absolute            0.03                MICROSCOPIC ONLY, URINE  CT abdomen pelvis w IV contrast   Final Result    On today's exam the colon distal to this cecum is empty but there is    no pericolonic edema and to suggest definite colitis.  The CT abdomen    and pelvis otherwise does not show any acute changes..    Signed by Gary Sanchez MD     Medical Decision Making:    Patient in distress secondary to abdominal pain and nausea.   Patient treated with 1 L normal saline, Dilaudid, Zofran.  Feeling improved.  Lab work shows a mild hypokalemia.  Potassium will be written for home.  CT of the abdomen pelvis shows no acute process.  Urine without acute infection.  After discussion with patient he will be given follow-up with primary care and gastroenterology.  Will be started on Bentyl and Zofran.  Advised on return for new or worsening symptoms.  Stable time of discharge.    Differential Diagnoses Considered: Diverticulitis, kidney stone, UTI, gastroenteritis    Independent Interpretation of Studies:  I independently interpreted: CT of the abdomen pelvis shows no intra-abdominal free air.    Escalation of Care:  Appropriate for discharge and follow-up with primary care and gastroenterology.    Prescription Drug Consideration: Oral Bentyl, Zofran, potassium.          Procedure  Procedures     Zeb Hull DO  08/05/24 9377

## 2024-08-29 ENCOUNTER — OFFICE VISIT (OUTPATIENT)
Dept: GASTROENTEROLOGY | Facility: CLINIC | Age: 64
End: 2024-08-29
Payer: COMMERCIAL

## 2024-08-29 VITALS
DIASTOLIC BLOOD PRESSURE: 75 MMHG | HEART RATE: 64 BPM | WEIGHT: 217.4 LBS | SYSTOLIC BLOOD PRESSURE: 126 MMHG | BODY MASS INDEX: 29.45 KG/M2 | HEIGHT: 72 IN

## 2024-08-29 DIAGNOSIS — R13.10 DYSPHAGIA, UNSPECIFIED TYPE: ICD-10-CM

## 2024-08-29 DIAGNOSIS — R10.84 GENERALIZED ABDOMINAL PAIN: ICD-10-CM

## 2024-08-29 DIAGNOSIS — Z87.19 HX SBO: Primary | ICD-10-CM

## 2024-08-29 PROBLEM — R13.19 ESOPHAGEAL DYSPHAGIA: Status: ACTIVE | Noted: 2024-08-29

## 2024-08-29 PROCEDURE — 99214 OFFICE O/P EST MOD 30 MIN: CPT | Performed by: INTERNAL MEDICINE

## 2024-08-29 PROCEDURE — 3008F BODY MASS INDEX DOCD: CPT | Performed by: INTERNAL MEDICINE

## 2024-08-29 RX ORDER — PANTOPRAZOLE SODIUM 40 MG/1
40 TABLET, DELAYED RELEASE ORAL 2 TIMES DAILY
Qty: 60 TABLET | Refills: 11 | Status: SHIPPED | OUTPATIENT
Start: 2024-08-29 | End: 2025-08-29

## 2024-08-29 ASSESSMENT — ENCOUNTER SYMPTOMS
NEUROLOGICAL NEGATIVE: 1
VOMITING: 1
HEMATOLOGIC/LYMPHATIC NEGATIVE: 1
EYES NEGATIVE: 1
ABDOMINAL PAIN: 1
CARDIOVASCULAR NEGATIVE: 1
DIARRHEA: 1
PSYCHIATRIC NEGATIVE: 1
CONSTITUTIONAL NEGATIVE: 1
NAUSEA: 1
MUSCULOSKELETAL NEGATIVE: 1
ENDOCRINE NEGATIVE: 1
RESPIRATORY NEGATIVE: 1

## 2024-08-29 NOTE — H&P (VIEW-ONLY)
"Subjective   Patient ID: Jesus Rand \"Josh" is a 64 y.o. male who presents for Establish Care (New patient to office following up from an emergency room visit on 8/5/24 for abd pain; had a CT abd/pelvis - results printed. Currently taking famotidine and zofran. /Pain has been present on and off for roughly 6 weeks. ).    HPI  64-year-old male referred from ER to our office.  Initial presentation in March to [facility] ER with vomiting preceded by severe abdominal pain.  Vomiting began with undigested food and ended with bile lasted approximately 6 to 8 hours.  Ended with severe diarrhea.  Initial episode began in March lasted a day and a half resolved spontaneously associate with negative CT scan.  Second episode in July showed edema and distention to the mid jejunum without transition point.  Subsequent CT in August was unremarkable.    Admits to remote appendectomy at age 20 secondary to perforated appendicitis.    Additional abdominal surgeries.    Continues to complain of feeling of incomplete swallowing food lodging or feeling stuck in his posterior pharynx, suprasternal notch.  History of hiccups and belching after meals.  Some intermittent dysphagia in his lower esophagus if he eats too quickly.    Review of Systems   Constitutional: Negative.    HENT: Negative.     Eyes: Negative.    Respiratory: Negative.     Cardiovascular: Negative.    Gastrointestinal:  Positive for abdominal pain, diarrhea, nausea and vomiting.   Endocrine: Negative.    Genitourinary: Negative.    Musculoskeletal: Negative.    Neurological: Negative.    Hematological: Negative.    Psychiatric/Behavioral: Negative.         Objective   Physical Exam  Vitals and nursing note reviewed.   Constitutional:       Appearance: Normal appearance.   HENT:      Head: Normocephalic.      Mouth/Throat:      Mouth: Mucous membranes are moist.      Pharynx: Oropharynx is clear.   Eyes:      Pupils: Pupils are equal, round, and reactive to light. "   Cardiovascular:      Rate and Rhythm: Normal rate and regular rhythm.      Heart sounds: Normal heart sounds.   Pulmonary:      Effort: Pulmonary effort is normal.      Breath sounds: Normal breath sounds.   Abdominal:      General: Abdomen is flat. Bowel sounds are normal.      Palpations: Abdomen is soft.   Musculoskeletal:         General: Normal range of motion.      Cervical back: Normal range of motion and neck supple.   Skin:     General: Skin is warm and dry.   Neurological:      General: No focal deficit present.      Mental Status: He is alert and oriented to person, place, and time.   Psychiatric:         Mood and Affect: Mood normal.         Behavior: Behavior normal.         Assessment/Plan   Diagnoses and all orders for this visit:  Hx SBO  Generalized abdominal pain  -     Referral to Gastroenterology  -     FL upper GI single contrast w small bowel follow through; Future  Dysphagia, unspecified type  -     FL upper GI single contrast w small bowel follow through; Future  -     Esophagogastroduodenoscopy (EGD); Future    Changed to Protonix.  Arrange small bowel series.  Arrange EGD.       Kyaw Michael DO 08/29/24 6:01 PM

## 2024-08-29 NOTE — PROGRESS NOTES
"Subjective   Patient ID: Jesus Rand \"Josh" is a 64 y.o. male who presents for Establish Care (New patient to office following up from an emergency room visit on 8/5/24 for abd pain; had a CT abd/pelvis - results printed. Currently taking famotidine and zofran. /Pain has been present on and off for roughly 6 weeks. ).    HPI  64-year-old male referred from ER to our office.  Initial presentation in March to [facility] ER with vomiting preceded by severe abdominal pain.  Vomiting began with undigested food and ended with bile lasted approximately 6 to 8 hours.  Ended with severe diarrhea.  Initial episode began in March lasted a day and a half resolved spontaneously associate with negative CT scan.  Second episode in July showed edema and distention to the mid jejunum without transition point.  Subsequent CT in August was unremarkable.    Admits to remote appendectomy at age 20 secondary to perforated appendicitis.    Additional abdominal surgeries.    Continues to complain of feeling of incomplete swallowing food lodging or feeling stuck in his posterior pharynx, suprasternal notch.  History of hiccups and belching after meals.  Some intermittent dysphagia in his lower esophagus if he eats too quickly.    Review of Systems   Constitutional: Negative.    HENT: Negative.     Eyes: Negative.    Respiratory: Negative.     Cardiovascular: Negative.    Gastrointestinal:  Positive for abdominal pain, diarrhea, nausea and vomiting.   Endocrine: Negative.    Genitourinary: Negative.    Musculoskeletal: Negative.    Neurological: Negative.    Hematological: Negative.    Psychiatric/Behavioral: Negative.         Objective   Physical Exam  Vitals and nursing note reviewed.   Constitutional:       Appearance: Normal appearance.   HENT:      Head: Normocephalic.      Mouth/Throat:      Mouth: Mucous membranes are moist.      Pharynx: Oropharynx is clear.   Eyes:      Pupils: Pupils are equal, round, and reactive to light. "   Cardiovascular:      Rate and Rhythm: Normal rate and regular rhythm.      Heart sounds: Normal heart sounds.   Pulmonary:      Effort: Pulmonary effort is normal.      Breath sounds: Normal breath sounds.   Abdominal:      General: Abdomen is flat. Bowel sounds are normal.      Palpations: Abdomen is soft.   Musculoskeletal:         General: Normal range of motion.      Cervical back: Normal range of motion and neck supple.   Skin:     General: Skin is warm and dry.   Neurological:      General: No focal deficit present.      Mental Status: He is alert and oriented to person, place, and time.   Psychiatric:         Mood and Affect: Mood normal.         Behavior: Behavior normal.         Assessment/Plan   Diagnoses and all orders for this visit:  Hx SBO  Generalized abdominal pain  -     Referral to Gastroenterology  -     FL upper GI single contrast w small bowel follow through; Future  Dysphagia, unspecified type  -     FL upper GI single contrast w small bowel follow through; Future  -     Esophagogastroduodenoscopy (EGD); Future    Changed to Protonix.  Arrange small bowel series.  Arrange EGD.       Kyaw Michael DO 08/29/24 6:01 PM

## 2024-09-04 ENCOUNTER — HOSPITAL ENCOUNTER (OUTPATIENT)
Dept: RADIOLOGY | Facility: HOSPITAL | Age: 64
Discharge: HOME | End: 2024-09-04
Payer: COMMERCIAL

## 2024-09-04 DIAGNOSIS — R13.10 DYSPHAGIA, UNSPECIFIED TYPE: ICD-10-CM

## 2024-09-04 DIAGNOSIS — R10.84 GENERALIZED ABDOMINAL PAIN: ICD-10-CM

## 2024-09-04 PROCEDURE — A9698 NON-RAD CONTRAST MATERIALNOC: HCPCS | Performed by: INTERNAL MEDICINE

## 2024-09-04 PROCEDURE — 2500000005 HC RX 250 GENERAL PHARMACY W/O HCPCS: Performed by: INTERNAL MEDICINE

## 2024-09-04 PROCEDURE — 74248 X-RAY SM INT F-THRU STD: CPT

## 2024-09-04 PROCEDURE — 74248 X-RAY SM INT F-THRU STD: CPT | Performed by: RADIOLOGY

## 2024-09-04 PROCEDURE — 74246 X-RAY XM UPR GI TRC 2CNTRST: CPT | Performed by: RADIOLOGY

## 2024-09-05 ENCOUNTER — TELEPHONE (OUTPATIENT)
Dept: GASTROENTEROLOGY | Facility: CLINIC | Age: 64
End: 2024-09-05
Payer: COMMERCIAL

## 2024-09-05 NOTE — TELEPHONE ENCOUNTER
Called patient with results. He verbalized understanding.   ----- Message from Kyaw Michael sent at 9/4/2024  3:08 PM EDT -----  Please let David know that his small bowel series reveals no evidence of intussusception, obstruction or abnormality follow-up on as-needed basis

## 2024-09-20 ENCOUNTER — HOSPITAL ENCOUNTER (OUTPATIENT)
Dept: GASTROENTEROLOGY | Facility: CLINIC | Age: 64
Setting detail: OUTPATIENT SURGERY
Discharge: HOME | End: 2024-09-20
Payer: COMMERCIAL

## 2024-09-20 VITALS
WEIGHT: 213 LBS | DIASTOLIC BLOOD PRESSURE: 87 MMHG | OXYGEN SATURATION: 97 % | BODY MASS INDEX: 28.85 KG/M2 | RESPIRATION RATE: 18 BRPM | TEMPERATURE: 97.9 F | HEART RATE: 56 BPM | SYSTOLIC BLOOD PRESSURE: 146 MMHG | HEIGHT: 72 IN

## 2024-09-20 DIAGNOSIS — R13.10 DYSPHAGIA, UNSPECIFIED TYPE: Primary | ICD-10-CM

## 2024-09-20 PROCEDURE — 3700000012 HC SEDATION LEVEL 5+ TIME - INITIAL 15 MINUTES 5/> YEARS

## 2024-09-20 PROCEDURE — 7100000010 HC PHASE TWO TIME - EACH INCREMENTAL 1 MINUTE

## 2024-09-20 PROCEDURE — 43239 EGD BIOPSY SINGLE/MULTIPLE: CPT | Performed by: INTERNAL MEDICINE

## 2024-09-20 PROCEDURE — 7100000009 HC PHASE TWO TIME - INITIAL BASE CHARGE

## 2024-09-20 PROCEDURE — 2500000004 HC RX 250 GENERAL PHARMACY W/ HCPCS (ALT 636 FOR OP/ED): Performed by: INTERNAL MEDICINE

## 2024-09-20 PROCEDURE — 2500000005 HC RX 250 GENERAL PHARMACY W/O HCPCS: Performed by: INTERNAL MEDICINE

## 2024-09-20 RX ORDER — FENTANYL CITRATE 50 UG/ML
INJECTION, SOLUTION INTRAMUSCULAR; INTRAVENOUS AS NEEDED
Status: COMPLETED | OUTPATIENT
Start: 2024-09-20 | End: 2024-09-20

## 2024-09-20 RX ORDER — MIDAZOLAM HYDROCHLORIDE 5 MG/ML
INJECTION, SOLUTION INTRAMUSCULAR; INTRAVENOUS AS NEEDED
Status: COMPLETED | OUTPATIENT
Start: 2024-09-20 | End: 2024-09-20

## 2024-09-20 RX ORDER — MEPERIDINE HYDROCHLORIDE 25 MG/ML
INJECTION INTRAMUSCULAR; INTRAVENOUS; SUBCUTANEOUS AS NEEDED
Status: COMPLETED | OUTPATIENT
Start: 2024-09-20 | End: 2024-09-20

## 2024-09-20 RX ORDER — SODIUM CHLORIDE, SODIUM LACTATE, POTASSIUM CHLORIDE, CALCIUM CHLORIDE 600; 310; 30; 20 MG/100ML; MG/100ML; MG/100ML; MG/100ML
20 INJECTION, SOLUTION INTRAVENOUS CONTINUOUS
Status: DISCONTINUED | OUTPATIENT
Start: 2024-09-20 | End: 2024-09-21 | Stop reason: HOSPADM

## 2024-09-20 ASSESSMENT — COLUMBIA-SUICIDE SEVERITY RATING SCALE - C-SSRS
1. IN THE PAST MONTH, HAVE YOU WISHED YOU WERE DEAD OR WISHED YOU COULD GO TO SLEEP AND NOT WAKE UP?: NO
6. HAVE YOU EVER DONE ANYTHING, STARTED TO DO ANYTHING, OR PREPARED TO DO ANYTHING TO END YOUR LIFE?: NO
2. HAVE YOU ACTUALLY HAD ANY THOUGHTS OF KILLING YOURSELF?: NO

## 2024-09-20 ASSESSMENT — PAIN SCALES - GENERAL
PAINLEVEL_OUTOF10: 0 - NO PAIN

## 2024-09-20 ASSESSMENT — PAIN - FUNCTIONAL ASSESSMENT
PAIN_FUNCTIONAL_ASSESSMENT: 0-10

## 2024-09-20 NOTE — DISCHARGE INSTRUCTIONS

## 2024-09-30 LAB
LABORATORY COMMENT REPORT: NORMAL
PATH REPORT.FINAL DX SPEC: NORMAL
PATH REPORT.GROSS SPEC: NORMAL
PATH REPORT.RELEVANT HX SPEC: NORMAL
PATH REPORT.TOTAL CANCER: NORMAL

## 2024-10-02 ENCOUNTER — TELEPHONE (OUTPATIENT)
Dept: GASTROENTEROLOGY | Facility: CLINIC | Age: 64
End: 2024-10-02
Payer: COMMERCIAL

## 2024-10-02 NOTE — TELEPHONE ENCOUNTER
Called patient with results and he verbalized understanding. As of right now he not having any issues with the dysphagia but if it were to occur again, he will call office to have manometry set up  ----- Message from Kyaw Michael sent at 10/1/2024  2:36 PM EDT -----  Upper endoscopy reveals no hiatal hernia no significant reflux and no H. pylori.  No discernible cause for dysphagia was noted on the study.  If dysphagia persist would recommend is off of manometry.

## 2024-10-04 ENCOUNTER — APPOINTMENT (OUTPATIENT)
Dept: PRIMARY CARE | Facility: CLINIC | Age: 64
End: 2024-10-04
Payer: COMMERCIAL

## 2024-10-07 ENCOUNTER — APPOINTMENT (OUTPATIENT)
Dept: GASTROENTEROLOGY | Facility: CLINIC | Age: 64
End: 2024-10-07
Payer: COMMERCIAL

## 2024-10-24 DIAGNOSIS — M25.552 LEFT HIP PAIN: ICD-10-CM

## 2024-10-24 RX ORDER — ASPIRIN 81 MG/1
81 TABLET ORAL DAILY
Qty: 90 TABLET | Refills: 1 | Status: SHIPPED | OUTPATIENT
Start: 2024-10-24

## 2024-11-19 ENCOUNTER — APPOINTMENT (OUTPATIENT)
Dept: RADIOLOGY | Facility: HOSPITAL | Age: 64
End: 2024-11-19
Payer: COMMERCIAL

## 2024-11-19 ENCOUNTER — HOSPITAL ENCOUNTER (OUTPATIENT)
Dept: CARDIOLOGY | Facility: HOSPITAL | Age: 64
Discharge: HOME | End: 2024-11-19
Payer: COMMERCIAL

## 2024-11-19 ENCOUNTER — HOSPITAL ENCOUNTER (EMERGENCY)
Facility: HOSPITAL | Age: 64
Discharge: HOME | End: 2024-11-19
Attending: EMERGENCY MEDICINE
Payer: COMMERCIAL

## 2024-11-19 VITALS
WEIGHT: 215 LBS | OXYGEN SATURATION: 97 % | TEMPERATURE: 97.2 F | HEIGHT: 72 IN | HEART RATE: 82 BPM | SYSTOLIC BLOOD PRESSURE: 162 MMHG | RESPIRATION RATE: 17 BRPM | DIASTOLIC BLOOD PRESSURE: 94 MMHG | BODY MASS INDEX: 29.12 KG/M2

## 2024-11-19 DIAGNOSIS — M48.02 SPINAL STENOSIS OF CERVICAL REGION: Primary | ICD-10-CM

## 2024-11-19 DIAGNOSIS — M54.12 CERVICAL RADICULOPATHY: ICD-10-CM

## 2024-11-19 DIAGNOSIS — M48.02 FORAMINAL STENOSIS OF CERVICAL REGION: ICD-10-CM

## 2024-11-19 LAB
ALBUMIN SERPL BCP-MCNC: 4.2 G/DL (ref 3.4–5)
ALP SERPL-CCNC: 59 U/L (ref 33–136)
ALT SERPL W P-5'-P-CCNC: 10 U/L (ref 10–52)
ANION GAP SERPL CALC-SCNC: 11 MMOL/L (ref 10–20)
AST SERPL W P-5'-P-CCNC: 14 U/L (ref 9–39)
ATRIAL RATE: 55 BPM
ATRIAL RATE: 69 BPM
BASOPHILS # BLD AUTO: 0.05 X10*3/UL (ref 0–0.1)
BASOPHILS NFR BLD AUTO: 0.7 %
BILIRUB SERPL-MCNC: 0.9 MG/DL (ref 0–1.2)
BUN SERPL-MCNC: 20 MG/DL (ref 6–23)
CALCIUM SERPL-MCNC: 8.8 MG/DL (ref 8.6–10.3)
CARDIAC TROPONIN I PNL SERPL HS: 3 NG/L (ref 0–20)
CARDIAC TROPONIN I PNL SERPL HS: 3 NG/L (ref 0–20)
CHLORIDE SERPL-SCNC: 103 MMOL/L (ref 98–107)
CO2 SERPL-SCNC: 27 MMOL/L (ref 21–32)
CREAT SERPL-MCNC: 0.96 MG/DL (ref 0.5–1.3)
EGFRCR SERPLBLD CKD-EPI 2021: 89 ML/MIN/1.73M*2
EOSINOPHIL # BLD AUTO: 0.1 X10*3/UL (ref 0–0.7)
EOSINOPHIL NFR BLD AUTO: 1.4 %
ERYTHROCYTE [DISTWIDTH] IN BLOOD BY AUTOMATED COUNT: 12 % (ref 11.5–14.5)
GLUCOSE SERPL-MCNC: 99 MG/DL (ref 74–99)
HCT VFR BLD AUTO: 40.4 % (ref 41–52)
HGB BLD-MCNC: 13.7 G/DL (ref 13.5–17.5)
HOLD SPECIMEN: NORMAL
IMM GRANULOCYTES # BLD AUTO: 0.03 X10*3/UL (ref 0–0.7)
IMM GRANULOCYTES NFR BLD AUTO: 0.4 % (ref 0–0.9)
LYMPHOCYTES # BLD AUTO: 1.64 X10*3/UL (ref 1.2–4.8)
LYMPHOCYTES NFR BLD AUTO: 22.3 %
MAGNESIUM SERPL-MCNC: 1.81 MG/DL (ref 1.6–2.4)
MCH RBC QN AUTO: 30.2 PG (ref 26–34)
MCHC RBC AUTO-ENTMCNC: 33.9 G/DL (ref 32–36)
MCV RBC AUTO: 89 FL (ref 80–100)
MONOCYTES # BLD AUTO: 0.59 X10*3/UL (ref 0.1–1)
MONOCYTES NFR BLD AUTO: 8 %
NEUTROPHILS # BLD AUTO: 4.93 X10*3/UL (ref 1.2–7.7)
NEUTROPHILS NFR BLD AUTO: 67.2 %
NRBC BLD-RTO: 0 /100 WBCS (ref 0–0)
P AXIS: 62 DEGREES
P AXIS: 79 DEGREES
P OFFSET: 200 MS
P OFFSET: 205 MS
P ONSET: 148 MS
P ONSET: 153 MS
PLATELET # BLD AUTO: 254 X10*3/UL (ref 150–450)
POTASSIUM SERPL-SCNC: 3.9 MMOL/L (ref 3.5–5.3)
PR INTERVAL: 120 MS
PR INTERVAL: 128 MS
PROT SERPL-MCNC: 7 G/DL (ref 6.4–8.2)
Q ONSET: 212 MS
Q ONSET: 213 MS
QRS COUNT: 12 BEATS
QRS COUNT: 9 BEATS
QRS DURATION: 86 MS
QRS DURATION: 88 MS
QT INTERVAL: 398 MS
QT INTERVAL: 420 MS
QTC CALCULATION(BAZETT): 401 MS
QTC CALCULATION(BAZETT): 426 MS
QTC FREDERICIA: 408 MS
QTC FREDERICIA: 417 MS
R AXIS: -25 DEGREES
R AXIS: -36 DEGREES
RBC # BLD AUTO: 4.54 X10*6/UL (ref 4.5–5.9)
SODIUM SERPL-SCNC: 137 MMOL/L (ref 136–145)
T AXIS: 40 DEGREES
T AXIS: 76 DEGREES
T OFFSET: 412 MS
T OFFSET: 422 MS
VENTRICULAR RATE: 55 BPM
VENTRICULAR RATE: 69 BPM
WBC # BLD AUTO: 7.3 X10*3/UL (ref 4.4–11.3)

## 2024-11-19 PROCEDURE — 2500000004 HC RX 250 GENERAL PHARMACY W/ HCPCS (ALT 636 FOR OP/ED): Performed by: NURSE PRACTITIONER

## 2024-11-19 PROCEDURE — 71045 X-RAY EXAM CHEST 1 VIEW: CPT | Performed by: RADIOLOGY

## 2024-11-19 PROCEDURE — 72125 CT NECK SPINE W/O DYE: CPT | Performed by: RADIOLOGY

## 2024-11-19 PROCEDURE — 36415 COLL VENOUS BLD VENIPUNCTURE: CPT | Performed by: NURSE PRACTITIONER

## 2024-11-19 PROCEDURE — 93005 ELECTROCARDIOGRAM TRACING: CPT

## 2024-11-19 PROCEDURE — 84484 ASSAY OF TROPONIN QUANT: CPT | Performed by: NURSE PRACTITIONER

## 2024-11-19 PROCEDURE — 96375 TX/PRO/DX INJ NEW DRUG ADDON: CPT | Performed by: EMERGENCY MEDICINE

## 2024-11-19 PROCEDURE — 99285 EMERGENCY DEPT VISIT HI MDM: CPT | Mod: 25 | Performed by: EMERGENCY MEDICINE

## 2024-11-19 PROCEDURE — 83735 ASSAY OF MAGNESIUM: CPT | Performed by: NURSE PRACTITIONER

## 2024-11-19 PROCEDURE — 96374 THER/PROPH/DIAG INJ IV PUSH: CPT | Performed by: EMERGENCY MEDICINE

## 2024-11-19 PROCEDURE — 72125 CT NECK SPINE W/O DYE: CPT

## 2024-11-19 PROCEDURE — 85025 COMPLETE CBC W/AUTO DIFF WBC: CPT | Performed by: NURSE PRACTITIONER

## 2024-11-19 PROCEDURE — 80053 COMPREHEN METABOLIC PANEL: CPT | Performed by: NURSE PRACTITIONER

## 2024-11-19 PROCEDURE — 71045 X-RAY EXAM CHEST 1 VIEW: CPT

## 2024-11-19 RX ORDER — PREDNISONE 50 MG/1
50 TABLET ORAL DAILY
Qty: 7 TABLET | Refills: 0 | Status: SHIPPED | OUTPATIENT
Start: 2024-11-19 | End: 2024-11-26

## 2024-11-19 RX ORDER — IBUPROFEN 200 MG
400 TABLET ORAL EVERY 6 HOURS PRN
COMMUNITY

## 2024-11-19 RX ORDER — KETOROLAC TROMETHAMINE 30 MG/ML
15 INJECTION, SOLUTION INTRAMUSCULAR; INTRAVENOUS ONCE
Status: COMPLETED | OUTPATIENT
Start: 2024-11-19 | End: 2024-11-19

## 2024-11-19 RX ORDER — NAPROXEN 500 MG/1
500 TABLET ORAL
Qty: 30 TABLET | Refills: 0 | Status: SHIPPED | OUTPATIENT
Start: 2024-11-19 | End: 2024-12-04

## 2024-11-19 ASSESSMENT — HEART SCORE
TROPONIN: LESS THAN OR EQUAL TO NORMAL LIMIT
AGE: 45-64
HISTORY: SLIGHTLY SUSPICIOUS
HEART SCORE: 2
RISK FACTORS: 1-2 RISK FACTORS
ECG: NORMAL

## 2024-11-19 ASSESSMENT — PAIN SCALES - GENERAL
PAINLEVEL_OUTOF10: 8
PAINLEVEL_OUTOF10: 8

## 2024-11-19 ASSESSMENT — PAIN DESCRIPTION - DESCRIPTORS: DESCRIPTORS: ACHING;SHARP

## 2024-11-19 ASSESSMENT — PAIN - FUNCTIONAL ASSESSMENT: PAIN_FUNCTIONAL_ASSESSMENT: 0-10

## 2024-11-19 NOTE — DISCHARGE INSTRUCTIONS
Follow-up with primary care physician for referral to either pain management or neurosurgery for further evaluation for your neck pain

## 2024-11-19 NOTE — ED PROVIDER NOTES
"Chief Complaint   Patient presents with    Chest Pain     Right sided chest pain radiating into right arm with tingling in fingers on right hand \"for a couple weeks\". Denies injury        Patient History    Past Medical History:   Diagnosis Date    GERD (gastroesophageal reflux disease)     Hypertension       Past Surgical History:   Procedure Laterality Date    OTHER SURGICAL HISTORY  09/20/2019    Appendectomy    SHOULDER SURGERY Left     TOTAL HIP ARTHROPLASTY Left       No family history on file.   Social History     Social History Narrative    Not on file      Allergies   Allergen Reactions    Penicillins Swelling and Other        PMH: Reviewed  PSH: Reviewed  Social History: Reviewed.   Allergies reviewed.     HPI: Jesus Rand \"Josh" is a right-handed 64 y.o. male who presents to the ED today unaccompanied with complaints of right sided chest and arm pain.  States his symptoms been ongoing over the last month.  Progressively worsening.  He feels the pain when he lifts his head that goes from the right side of his chest down his right arm.  States he initially thought that he coughed and pulled a muscle in his chest.  He has had a massage which did not improve his symptoms.  He occasionally takes ibuprofen.  States it is progressively worsening to the point now he is not getting more than an hour of sleep at a time.  States it is always there, gets worse at times with certain movements.  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      REVIEW OF SYSTEMS:  All other systems reviewed and negative except as listed in HPI.    PHYSICAL EXAM:    GENERAL: Vitals noted, no distress. Alert and oriented x 3. Non-toxic.      EENT: TMs clear. Posterior oropharynx unremarkable. EOMI, no nystagmus noted.     NECK: Supple. No masses. No midline tenderness. No meningeal signs.  Full range of motion noted.    CARDIAC: Regular rate, rhythm. No murmurs rubs or " gallops. No JVD.  Right sided lateral chest wall tender to palpation, no rash or ecchymosis.    PULMONARY: Lungs clear and equal bilaterally. No wheezes rales or rhonchi. No respiratory distress.     ABDOMEN: Soft, nondistended, and nontender. No peritoneal signs. Bowel sounds are present and normoactive in all 4 quadrants. No pulsatile masses.     EXTREMITIES: No peripheral edema.     SKIN: No rash. Warm, dry, and intact.     NEURO: No focal neurologic deficits.     Labs Reviewed   CBC WITH AUTO DIFFERENTIAL - Abnormal       Result Value    WBC 7.3      nRBC 0.0      RBC 4.54      Hemoglobin 13.7      Hematocrit 40.4 (*)     MCV 89      MCH 30.2      MCHC 33.9      RDW 12.0      Platelets 254      Neutrophils % 67.2      Immature Granulocytes %, Automated 0.4      Lymphocytes % 22.3      Monocytes % 8.0      Eosinophils % 1.4      Basophils % 0.7      Neutrophils Absolute 4.93      Immature Granulocytes Absolute, Automated 0.03      Lymphocytes Absolute 1.64      Monocytes Absolute 0.59      Eosinophils Absolute 0.10      Basophils Absolute 0.05     COMPREHENSIVE METABOLIC PANEL - Normal    Glucose 99      Sodium 137      Potassium 3.9      Chloride 103      Bicarbonate 27      Anion Gap 11      Urea Nitrogen 20      Creatinine 0.96      eGFR 89      Calcium 8.8      Albumin 4.2      Alkaline Phosphatase 59      Total Protein 7.0      AST 14      Bilirubin, Total 0.9      ALT 10     MAGNESIUM - Normal    Magnesium 1.81     SERIAL TROPONIN-INITIAL - Normal    Troponin I, High Sensitivity 3      Narrative:     Less than 99th percentile of normal range cutoff-  Female and children under 18 years old <14 ng/L; Male <21 ng/L: Negative  Repeat testing should be performed if clinically indicated.     Female and children under 18 years old 14-50 ng/L; Male 21-50 ng/L:  Consistent with possible cardiac damage and possible increased clinical   risk. Serial measurements may help to assess extent of myocardial damage.     >50  ng/L: Consistent with cardiac damage, increased clinical risk and  myocardial infarction. Serial measurements may help assess extent of   myocardial damage.      NOTE: Children less than 1 year old may have higher baseline troponin   levels and results should be interpreted in conjunction with the overall   clinical context.     NOTE: Troponin I testing is performed using a different   testing methodology at [facility] than at other   [facility]. Direct result comparisons should only   be made within the same method.   SERIAL TROPONIN, 1 HOUR - Normal    Troponin I, High Sensitivity 3      Narrative:     Less than 99th percentile of normal range cutoff-  Female and children under 18 years old <14 ng/L; Male <21 ng/L: Negative  Repeat testing should be performed if clinically indicated.     Female and children under 18 years old 14-50 ng/L; Male 21-50 ng/L:  Consistent with possible cardiac damage and possible increased clinical   risk. Serial measurements may help to assess extent of myocardial damage.     >50 ng/L: Consistent with cardiac damage, increased clinical risk and  myocardial infarction. Serial measurements may help assess extent of   myocardial damage.      NOTE: Children less than 1 year old may have higher baseline troponin   levels and results should be interpreted in conjunction with the overall   clinical context.     NOTE: Troponin I testing is performed using a different   testing methodology at [facility] than at other   [facility]. Direct result comparisons should only   be made within the same method.   TROPONIN SERIES- (INITIAL, 1 HR)    Narrative:     The following orders were created for panel order Troponin I Series, High Sensitivity (0, 1 HR).  Procedure                               Abnormality         Status                     ---------                               -----------         ------                     Troponin I, High  Sensiti...[[id number]]  Normal              Final result               Troponin, High Sensitivi...[[id number]]  Normal              Final result                 Please view results for these tests on the individual orders.        CT cervical spine wo IV contrast   Final Result   Cervical spondylosis with spinal canal and bilateral foraminal   stenosis as above, without acute fracture or facet subluxation.        Signed by: Gibson Sellers 11/19/2024 12:32 PM   Dictation workstation:   [id number]      XR chest 1 view   Final Result   Thoracic spine DJD as described.  Remainder of the exam was negative.        MACRO:   None        Signed by: Micheal Bueno 11/19/2024 12:25 PM   Dictation workstation:   [id number]           Medical Decision Making  Amount and/or Complexity of Data Reviewed  Labs: ordered.  Radiology: ordered.  ECG/medicine tests: ordered.     EKG interpreted by myself shows SR with rate of 69.  Left axis. HI interval 120. QRS interval 86. QT interval 398. QTc interval 426. No acute ischemia or injury pattern. No changes from EKG dated     EKG #2 interpreted by myself shows SB with rate of 55.  Left axis. HI interval 128. QRS interval 88. QT interval 420. QTc interval 401. No acute ischemia or injury pattern. No changes from EKG #1.      ED COURSE: This patient was seen and examined by myself and Dr. Rojas. He is placed on a continuous cardiac monitor with pulse oximetry monitoring. Old records and EKGs are obtained and reviewed. IV heplock is established, labs are obtained and noted above.  CBC with differential, CMP, magnesium, troponin x 2 are all within normal limits.  He is given IV Solu-Medrol and Toradol with improvement of symptoms.  CT of the neck and x-ray of the chest noted above, showing cervical spondylosis with spinal canal and bilateral foraminal stenosis without acute fracture or facet subluxation and thoracic spine DJD.  Patient is made aware of these findings.  Very much contributing  to his symptoms today.  Started on high-dose steroids for a week as well as Naprosyn for his pain.  Recommended he follow-up with primary care for a referral to either pain management or neurosurgery for repeat evaluation and care. He is discharged home in a stable condition with computer instructions given and is encouraged to return to the ER for any new or worsening symptoms.         Heart score: 2    Differential Diagnoses Considered: MI/NSTEMI, pleurisy, pneumonia, bronchitis, cervical radiculopathy, arthritis    Chronic Medical Conditions Significantly Affecting Care: see above    External Records Reviewed: I reviewed recent and relevant outside records including: PCP notes, prior discharge summary, previous radiologic studies    Diagnostic testing considered: CT, x-ray, EKG, blood    Escalation of Care: Appropriate for outpatient management    Prescription Drug Consideration: Steroids, NSAIDs          DIAGNOSTIC IMPRESSION: #1 cervical radiculopathy #2 spinal stenosis #3 foraminal stenosis     Marely Oliver, MANI-CNP  11/19/24 [id number]

## 2024-11-27 ENCOUNTER — APPOINTMENT (OUTPATIENT)
Dept: PRIMARY CARE | Facility: CLINIC | Age: 64
End: 2024-11-27
Payer: COMMERCIAL

## 2024-11-27 VITALS
BODY MASS INDEX: 30.23 KG/M2 | HEIGHT: 72 IN | HEART RATE: 69 BPM | SYSTOLIC BLOOD PRESSURE: 130 MMHG | WEIGHT: 223.2 LBS | OXYGEN SATURATION: 96 % | DIASTOLIC BLOOD PRESSURE: 70 MMHG

## 2024-11-27 DIAGNOSIS — M48.02 FORAMINAL STENOSIS OF CERVICAL REGION: ICD-10-CM

## 2024-11-27 DIAGNOSIS — M54.12 CERVICAL RADICULOPATHY: ICD-10-CM

## 2024-11-27 DIAGNOSIS — M48.02 SPINAL STENOSIS OF CERVICAL REGION: ICD-10-CM

## 2024-11-27 PROCEDURE — 99214 OFFICE O/P EST MOD 30 MIN: CPT

## 2024-11-27 PROCEDURE — 1036F TOBACCO NON-USER: CPT

## 2024-11-27 PROCEDURE — 3008F BODY MASS INDEX DOCD: CPT

## 2024-11-27 RX ORDER — GABAPENTIN 100 MG/1
100 CAPSULE ORAL 3 TIMES DAILY
Qty: 90 CAPSULE | Refills: 2 | Status: SHIPPED | OUTPATIENT
Start: 2024-11-27 | End: 2025-02-25

## 2024-11-27 ASSESSMENT — ENCOUNTER SYMPTOMS
MYALGIAS: 1
CHEST TIGHTNESS: 0
CONSTIPATION: 0
NECK PAIN: 1
UNEXPECTED WEIGHT CHANGE: 0
TREMORS: 0
DIAPHORESIS: 0
POLYPHAGIA: 0
LIGHT-HEADEDNESS: 0
FREQUENCY: 0
PALPITATIONS: 0
WOUND: 0
SHORTNESS OF BREATH: 0
NUMBNESS: 0
DIARRHEA: 0
SPEECH DIFFICULTY: 0
NERVOUS/ANXIOUS: 0
RECTAL PAIN: 0
FATIGUE: 0
POLYDIPSIA: 0
DIZZINESS: 0
NAUSEA: 0
TROUBLE SWALLOWING: 0
ABDOMINAL PAIN: 0
WEAKNESS: 0
NECK STIFFNESS: 1
SEIZURES: 0
HEADACHES: 0
FACIAL ASYMMETRY: 0
CHILLS: 0
ARTHRALGIAS: 0
DIFFICULTY URINATING: 0

## 2024-11-27 NOTE — PROGRESS NOTES
Subjective   Patient ID: David Rand is a 64 y.o. male who presents for Hospital Follow-up (PT is here today for a hospital follow up visit on 11/19/24. ).    Patient presents for emergency department follow-up.  Had been experiencing right upper arm/neck/right upper chest pain.  Received full cardiac workup with cervical spine imaging.  Cardiac workup negative.  Seen and evaluated by Dr. David Rojas and Marely Oliver, CNP.  Patient diagnosed with cervical stenosis.     Cervical stenosis: Patient has pain with flexion of neck pain follows C3-C5 dermatomes.  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 positive Spurling sign.  Steroids prescribed emergency department effective.  Patient would like referral to surgery for evaluation.  Referred to Dr. Ravi Bellamy ().  Will trial low-dose gabapentin for pain relief.  No other neurological cardiovascular symptoms reported.         Review of Systems   Constitutional:  Negative for chills, diaphoresis, fatigue and unexpected weight change.   HENT:  Negative for dental problem, tinnitus and trouble swallowing.    Eyes:  Negative for visual disturbance.   Respiratory:  Negative for chest tightness and shortness of breath.    Cardiovascular:  Negative for chest pain, palpitations and leg swelling.   Gastrointestinal:  Negative for abdominal pain, constipation, diarrhea, nausea and rectal pain.   Endocrine: Negative for polydipsia, polyphagia and polyuria.   Genitourinary:  Negative for difficulty urinating, frequency and urgency.   Musculoskeletal:  Positive for myalgias, neck pain and neck stiffness. Negative for arthralgias.   Skin:  Negative for pallor and wound.   Neurological:  Negative for dizziness, tremors, seizures, syncope, facial asymmetry, speech difficulty, weakness, light-headedness, numbness and headaches.   Psychiatric/Behavioral:  Negative for suicidal ideas. The patient  is not nervous/anxious.        Objective   /70   Pulse 69   Ht 1.829 m (6')   Wt 101 kg (223 lb 3.2 oz)   SpO2 96%   BMI 30.27 kg/m²     Physical Exam  Vitals and nursing note reviewed.   Constitutional:       General: He is not in acute distress.     Appearance: Normal appearance. He is normal weight. He is not ill-appearing.   HENT:      Head: Normocephalic.      Nose: Nose normal.      Mouth/Throat:      Mouth: Mucous membranes are moist.      Pharynx: Oropharynx is clear.   Eyes:      Pupils: Pupils are equal, round, and reactive to light.   Neck:      Vascular: No carotid bruit.   Cardiovascular:      Rate and Rhythm: Normal rate and regular rhythm.      Pulses: Normal pulses.      Heart sounds: Normal heart sounds.   Pulmonary:      Effort: Pulmonary effort is normal. No respiratory distress.      Breath sounds: Normal breath sounds.   Abdominal:      General: Bowel sounds are normal.      Palpations: Abdomen is soft.   Musculoskeletal:         General: Tenderness (Cervical spine) present. No signs of injury. Normal range of motion.      Cervical back: Normal range of motion. Rigidity and tenderness present.   Lymphadenopathy:      Cervical: No cervical adenopathy.   Skin:     General: Skin is warm and dry.      Capillary Refill: Capillary refill takes less than 2 seconds.   Neurological:      General: No focal deficit present.      Mental Status: He is alert and oriented to person, place, and time. Mental status is at baseline.      Cranial Nerves: No cranial nerve deficit.      Sensory: No sensory deficit.      Motor: No weakness.      Coordination: Coordination normal.      Gait: Gait normal.      Deep Tendon Reflexes: Reflexes normal.   Psychiatric:         Mood and Affect: Mood normal.         Behavior: Behavior normal.         Thought Content: Thought content normal.         Judgment: Judgment normal.         Assessment/Plan   Diagnoses and all orders for this visit:  Spinal stenosis of  cervical region  -     Referral to Primary Care  -     Referral to Neurosurgery; Future  Foraminal stenosis of cervical region  -     Referral to Primary Care  -     Referral to Neurosurgery; Future  Cervical radiculopathy  -     Referral to Primary Care  -     gabapentin (Neurontin) 100 mg capsule; Take 1 capsule (100 mg) by mouth 3 times a day.

## 2024-12-11 ENCOUNTER — OFFICE VISIT (OUTPATIENT)
Dept: PRIMARY CARE | Facility: CLINIC | Age: 64
End: 2024-12-11
Payer: COMMERCIAL

## 2024-12-11 VITALS
HEIGHT: 72 IN | OXYGEN SATURATION: 96 % | WEIGHT: 223.6 LBS | SYSTOLIC BLOOD PRESSURE: 142 MMHG | HEART RATE: 56 BPM | DIASTOLIC BLOOD PRESSURE: 80 MMHG | BODY MASS INDEX: 30.28 KG/M2

## 2024-12-11 DIAGNOSIS — I10 PRIMARY HYPERTENSION: ICD-10-CM

## 2024-12-11 DIAGNOSIS — M54.12 CERVICAL RADICULOPATHY: ICD-10-CM

## 2024-12-11 PROCEDURE — 3077F SYST BP >= 140 MM HG: CPT

## 2024-12-11 PROCEDURE — 3079F DIAST BP 80-89 MM HG: CPT

## 2024-12-11 PROCEDURE — 1036F TOBACCO NON-USER: CPT

## 2024-12-11 PROCEDURE — 3008F BODY MASS INDEX DOCD: CPT

## 2024-12-11 PROCEDURE — 99214 OFFICE O/P EST MOD 30 MIN: CPT

## 2024-12-11 RX ORDER — CYCLOBENZAPRINE HCL 10 MG
10 TABLET ORAL 3 TIMES DAILY PRN
Qty: 30 TABLET | Refills: 0 | Status: SHIPPED | OUTPATIENT
Start: 2024-12-11 | End: 2024-12-21

## 2024-12-11 RX ORDER — GABAPENTIN 100 MG/1
300 CAPSULE ORAL 3 TIMES DAILY
Qty: 90 CAPSULE | Refills: 2 | Status: SHIPPED | OUTPATIENT
Start: 2024-12-11 | End: 2025-03-11

## 2024-12-11 RX ORDER — CHLORTHALIDONE 25 MG/1
25 TABLET ORAL DAILY
Qty: 90 TABLET | Refills: 3 | Status: SHIPPED | OUTPATIENT
Start: 2024-12-11 | End: 2025-12-11

## 2024-12-11 ASSESSMENT — ENCOUNTER SYMPTOMS
CHILLS: 0
POLYDIPSIA: 0
PAIN: 1
DIFFICULTY URINATING: 0
WEAKNESS: 0
MYALGIAS: 1
NUMBNESS: 0
NAUSEA: 0
SHORTNESS OF BREATH: 0
BACK PAIN: 1
WOUND: 0
DIAPHORESIS: 0
PALPITATIONS: 0
FATIGUE: 0
FREQUENCY: 0
ABDOMINAL PAIN: 0
DIARRHEA: 0
TROUBLE SWALLOWING: 0
CONSTIPATION: 0
RECTAL PAIN: 0
ARTHRALGIAS: 0
CHEST TIGHTNESS: 0
UNEXPECTED WEIGHT CHANGE: 0
POLYPHAGIA: 0
NECK PAIN: 1
HEADACHES: 0
NECK STIFFNESS: 1
NERVOUS/ANXIOUS: 0

## 2024-12-11 ASSESSMENT — PATIENT HEALTH QUESTIONNAIRE - PHQ9
2. FEELING DOWN, DEPRESSED OR HOPELESS: NOT AT ALL
SUM OF ALL RESPONSES TO PHQ9 QUESTIONS 1 AND 2: 0
1. LITTLE INTEREST OR PLEASURE IN DOING THINGS: NOT AT ALL

## 2024-12-11 NOTE — PROGRESS NOTES
Subjective   Patient ID: David Rand is a 64 y.o. male who presents for Pain (Pt is here today for pain follow up. Reports the new medication he was put on last time is not working. He was started on Gabapentin. ).    Patient presents today for follow-up for cervical radiculopathy.      Cervical radiculopathy: Previously diagnosed referred to neurosurgery for treatment.  Will be unable to see neurosurgery until February of next year.  Continues to have pain radiating from his neck into his right shoulder and arm.  Previously prescribed gabapentin 100 mg 3 times daily.  This has not been effective for pain control.  Will increase dosing to 300 mg 3 times daily.  Did discuss with the patient the side effects of gabapentin and for him to discontinue if experiencing any neurological side effects.  Upper right back and neck muscle spasms at present.  Patient is out of his prescription Flexeril which she had previously for hip pain.  Will refill medication.  The patient will contact other neurosurgeons in the [place] area and contact us if he find somebody with a sooner opening that we can refer to.  Will schedule appointment in 3 months for follow-up.    Hypertension: 142/80.  Medications refilled.  Patient stated is not been sleeping well and is in significant pain from his cervical issues.        Pain  Pertinent negatives include no abdominal pain, chest pain, constipation, diarrhea, fatigue, headaches, nausea, shortness of breath or weakness.        Review of Systems   Constitutional:  Negative for chills, diaphoresis, fatigue and unexpected weight change.   HENT:  Negative for dental problem, tinnitus and trouble swallowing.    Eyes:  Negative for visual disturbance.   Respiratory:  Negative for chest tightness and shortness of breath.    Cardiovascular:  Negative for chest pain, palpitations and leg swelling.   Gastrointestinal:  Negative for abdominal pain, constipation, diarrhea, nausea and rectal pain.    Endocrine: Negative for polydipsia, polyphagia and polyuria.   Genitourinary:  Negative for difficulty urinating, frequency and urgency.   Musculoskeletal:  Positive for back pain, myalgias, neck pain and neck stiffness. Negative for arthralgias.   Skin:  Negative for pallor and wound.   Neurological:  Negative for syncope, weakness, numbness and headaches.   Psychiatric/Behavioral:  Negative for suicidal ideas. The patient is not nervous/anxious.        Objective   /80   Pulse 56   Ht 1.829 m (6')   Wt 101 kg (223 lb 9.6 oz)   SpO2 96%   BMI 30.33 kg/m²     Physical Exam  Vitals and nursing note reviewed.   Constitutional:       General: He is not in acute distress.     Appearance: Normal appearance. He is normal weight.   HENT:      Head: Normocephalic.      Nose: Nose normal.      Mouth/Throat:      Mouth: Mucous membranes are moist.      Pharynx: Oropharynx is clear.   Eyes:      Pupils: Pupils are equal, round, and reactive to light.   Neck:      Vascular: No carotid bruit.   Cardiovascular:      Rate and Rhythm: Normal rate and regular rhythm.      Pulses: Normal pulses.      Heart sounds: Normal heart sounds.   Pulmonary:      Effort: Pulmonary effort is normal.      Breath sounds: Normal breath sounds.   Abdominal:      General: Bowel sounds are normal.      Palpations: Abdomen is soft.   Musculoskeletal:         General: Swelling (Right upper back) and tenderness present. No deformity or signs of injury. Normal range of motion.      Cervical back: Rigidity and tenderness present.   Lymphadenopathy:      Cervical: No cervical adenopathy.   Skin:     General: Skin is warm and dry.      Capillary Refill: Capillary refill takes less than 2 seconds.   Neurological:      General: No focal deficit present.      Mental Status: He is alert and oriented to person, place, and time. Mental status is at baseline.   Psychiatric:         Mood and Affect: Mood normal.         Behavior: Behavior normal.          Thought Content: Thought content normal.         Judgment: Judgment normal.         Assessment/Plan   Diagnoses and all orders for this visit:  Primary hypertension  -     chlorthalidone (Hygroton) 25 mg tablet; Take 1 tablet (25 mg) by mouth once daily.  Cervical radiculopathy  -     gabapentin (Neurontin) 100 mg capsule; Take 3 capsules (300 mg) by mouth 3 times a day.  -     cyclobenzaprine (Flexeril) 10 mg tablet; Take 1 tablet (10 mg) by mouth 3 times a day as needed for muscle spasms for up to 10 days.

## 2025-01-16 DIAGNOSIS — Z48.89 ENCOUNTER FOR POSTOPERATIVE CARE: ICD-10-CM

## 2025-01-24 ENCOUNTER — TELEPHONE (OUTPATIENT)
Dept: PRIMARY CARE | Facility: CLINIC | Age: 65
End: 2025-01-24
Payer: COMMERCIAL

## 2025-01-24 DIAGNOSIS — M43.02 CERVICAL SPONDYLOLYSIS: Primary | ICD-10-CM

## 2025-01-24 NOTE — PROGRESS NOTES
Patient is to be evaluated by neuro spine, Dr. Bellamy.  Dr. Bellamy is requesting MRI of the cervical spine prior to his appointment.  Order placed.

## 2025-01-24 NOTE — TELEPHONE ENCOUNTER
HE CALLED AND SAID THAT THE OFFICE OF DR. FIORE CALLED AND SAID THAT HE NEEDS A MRI BEFORE HE COMES TO THEM. CAN YOU PUT THE ORDER IN PLEASE. THANK YOU.

## 2025-01-29 ENCOUNTER — TELEPHONE (OUTPATIENT)
Dept: PRIMARY CARE | Facility: CLINIC | Age: 65
End: 2025-01-29
Payer: COMMERCIAL

## 2025-01-29 NOTE — TELEPHONE ENCOUNTER
Let him know I called the insurance company.  I have a peer to peer discussion scheduled with them on Friday morning to discuss it

## 2025-01-29 NOTE — TELEPHONE ENCOUNTER
Is scheduled for an MRI on 02/04/25 and his insurance denied this. States they told him that  could do a peer to peer and this MRI is needed for a neurosurgeon. He would like to know if this is something that we have received.

## 2025-01-29 NOTE — TELEPHONE ENCOUNTER
Spoke to pt and let him. He expressed a verbal understanding and nothing further was needed at this time. He did request a phone call after the peer to peer is done if a decision is reached.

## 2025-01-30 ENCOUNTER — APPOINTMENT (OUTPATIENT)
Dept: ORTHOPEDIC SURGERY | Facility: CLINIC | Age: 65
End: 2025-01-30
Payer: COMMERCIAL

## 2025-01-30 ENCOUNTER — HOSPITAL ENCOUNTER (OUTPATIENT)
Dept: RADIOLOGY | Facility: CLINIC | Age: 65
Discharge: HOME | End: 2025-01-30
Payer: COMMERCIAL

## 2025-01-30 DIAGNOSIS — M16.12 PRIMARY OSTEOARTHRITIS OF LEFT HIP: ICD-10-CM

## 2025-01-30 DIAGNOSIS — Z48.89 ENCOUNTER FOR POSTOPERATIVE CARE: ICD-10-CM

## 2025-01-30 PROCEDURE — 99213 OFFICE O/P EST LOW 20 MIN: CPT | Performed by: SPECIALIST

## 2025-01-30 PROCEDURE — 1036F TOBACCO NON-USER: CPT | Performed by: SPECIALIST

## 2025-01-30 PROCEDURE — 73502 X-RAY EXAM HIP UNI 2-3 VIEWS: CPT | Mod: LT

## 2025-01-30 ASSESSMENT — PAIN - FUNCTIONAL ASSESSMENT: PAIN_FUNCTIONAL_ASSESSMENT: NO/DENIES PAIN

## 2025-01-30 NOTE — PROGRESS NOTES
"Assessment/Plan   Encounter Diagnoses:  Primary osteoarthritis of left hip  Osteoarthritis of left hip, unspecified osteoarthritis type  Assessment: 11 months and 4 days month status post left total hip arthroplasty.  States he is doing well and he is returning to activities of daily living and athletics with no pain      Plan: Follow-up in 1 year for reevaluation with new x-rays.  Continue to advance his activities.  I reviewed hip precautions with him and he understands these.  He will continue his baby aspirin per his internist.           Subjective    Patient ID: Jesus Rand \"Josh" is a 64 y.o. male.    Chief Complaint: Follow-up of the Left Hip (1YR FOLLOW UP/X-RAYS ORDERED )     Last Surgery: Arthroplasty Total Hip Posterior Approach - Left  Last Surgery Date: 2/26/2024    HPI  Left hip replacement 11 months and 4 days status post posterior approach.  He states that he is having no pain and is back to every day activity without any concerns.  He does have spinal stenosis and is being evaluated by a spine surgeon for possible decompression.    OBJECTIVE: ORTHO EXAM  Left hip exam  The incision is clean and dry.  He has good range of motion without any pain.  He ambulates with a normal gait.  He is neurovascularly intact distally.    X-rays show the total hip arthroplasty to be in an excellent position.  There is no signs of loosening and good bone to implant integration is seen.    IMAGE RESULTS:  ECG 12 lead  Sinus bradycardia  Otherwise normal ECG  When compared with ECG of 19-NOV-2024 11:42, (unconfirmed)  No significant change was found  See ED provider note for full interpretation and clinical correlation  Confirmed by Marely Oliver ([id number]) on 11/19/2024 3:34:44 PM  ECG 12 lead  Normal sinus rhythm  Left axis deviation  Abnormal ECG  When compared with ECG of 03-JUL-2024 08:18,  No significant change was found  See ED provider note for full interpretation and clinical correlation  Confirmed by " Elsa Pro ([id number]) on 11/19/2024 3:34:27 PM  CT cervical spine wo IV contrast  Narrative: Interpreted By:  Gibson Sellers,   STUDY:  CT CERVICAL SPINE WO IV CONTRAST; 11/19/2024 12:20 pm      INDICATION:  Signs/Symptoms:pain with movement, pain down to right arm      COMPARISON:  None.      ACCESSION NUMBER(S):  [id number]      ORDERING CLINICIAN:  ELSA PRO      TECHNIQUE:  Axial unenhanced images were obtained through the cervical spine.  Sagittal and coronal post processing reconstruction images were  obtained.      All CT examinations are performed with one or more of the following  dose reduction techniques: Automated Exposure Control, adjustment of  mA and/or kV according to patient size, or use of iterative  reconstruction techniques.      FINDINGS:  No fracture is seen.  The vertebral body heights are maintained.  The vertebral alignment is anatomic; this is confirmed on the  sagittal reconstruction images. Degenerative changes involve the  articulation between the odontoid process and anterior arch of the C1  vertebra. There is narrowing of the C2-C3, C3-C4, C4-C5 and mostly  the C5-C6, C6-C7 and C7-T1 discs with marginal osteophytes. There are  also hypertrophic changes of facet joints bilaterally. There is  associated bilateral foraminal stenosis, especially involving the  left C3-C4 and bilateral C5-C6 neural foramina. There is dense  calcification posteriorly to the C5 spinous process. Images from the  thoracic inlet are unremarkable.      Impression: Cervical spondylosis with spinal canal and bilateral foraminal  stenosis as above, without acute fracture or facet subluxation.      Signed by: Gibson Sellers 11/19/2024 12:32 PM  Dictation workstation:   [id number]  XR chest 1 view  Narrative: Interpreted By:  Micheal Bueno,   STUDY:  XR CHEST 1 VIEW;  11/19/2024 12:06 pm      INDICATION:  Signs/Symptoms:Chest Pain.      COMPARISON:  CT scan abdomen and pelvis from 08/05/2024.  Chest x-rays, most recent  from 07/03/2024. Dot      ACCESSION NUMBER(S):  [id number]      ORDERING CLINICIAN:  ELSA PRO      TECHNIQUE:  Single AP portable view of the chest was obtained.      FINDINGS:  MEDIASTINUM/ LUNGS/ MEMO:  No cardiomegaly, vascular congestion, or pleural effusion.  No abnormal opacity in either lung worrisome for tumor or pneumonia.  No pneumothorax.  No tracheal deviation.  No abnormal hilar fullness or gross mass on either side.      BONES:  No lytic or blastic destructive bone lesion.  There is  mild-to-moderate disc space narrowing and endplate osteophytosis  throughout the thoracic spine.      UPPER ABDOMEN:  Grossly intact.      Impression: Thoracic spine DJD as described.  Remainder of the exam was negative.      MACRO:  None      Signed by: Micheal Bueno 11/19/2024 12:25 PM  Dictation workstation:   [id number]      ULTRASOUND  None    Procedures     No orders of the defined types were placed in this encounter.

## 2025-01-30 NOTE — ASSESSMENT & PLAN NOTE
Assessment: 11 months and 4 days month status post left total hip arthroplasty.  States he is doing well and he is returning to activities of daily living and athletics with no pain      Plan: Follow-up in 1 year for reevaluation with new x-rays.  Continue to advance his activities.  I reviewed hip precautions with him and he understands these.  He will continue his baby aspirin per his internist.

## 2025-01-31 ENCOUNTER — DOCUMENTATION (OUTPATIENT)
Dept: PRIMARY CARE | Facility: CLINIC | Age: 65
End: 2025-01-31
Payer: COMMERCIAL

## 2025-01-31 NOTE — PROGRESS NOTES
[facility] [place]  Department of Neurological Surgery  New Patient Visit    History of Present Illness:  Jesus Rand is a 64 y.o. year old male who presents to the spine clinic with right side chest pain that radiates down the right arm for the past 3 months. He describes his symptoms as intermittent aching predominately in the right arm in C8 dermatone that starts at the back of the neck radiating down the lats, the forearm, and hand.  He visited the ED on 11/19/24 for this and received a full cardiac workup with cervical spine imaging.  Cardiac workup was negative and he was diagnosed with cervical stenosis.  Endorses pain with flexion around C3-C5.  He does regular desk/computer work which aggravates his pain when he adjusts his head to look through his bifocals.  He has previously tried gabapentin 100 mg 3 times a day as well as 300 mg 3 times a day which did not provide relief.  Has been in PT for 6 weeks focusing on traction and HEP which provides benefit with numbness but continues to experience pain.      Prior Spine Surgeries: No    Physical Therapy: Yes    Diabetic: No       Osteoporosis: No  No DXA results found for the past 12 months    Patient's BMI is Body mass index is 30.68 kg/m².    Review of Systems:  14/14 systems reviewed and negative other than what is listed in the history of present illness    Patient Active Problem List   Diagnosis    Arthritis of left hip    Osteoarthritis of left hip, unspecified osteoarthritis type    Primary osteoarthritis of left hip    Hx SBO    Esophageal dysphagia    Cervical radiculopathy    Foraminal stenosis of cervical region    Primary hypertension     Past Medical History:   Diagnosis Date    GERD (gastroesophageal reflux disease)     Hypertension      Past Surgical History:   Procedure Laterality Date    OTHER SURGICAL HISTORY  09/20/2019    Appendectomy    SHOULDER SURGERY Left     TOTAL HIP ARTHROPLASTY Left      Social History     Tobacco  Use    Smoking status: Never    Smokeless tobacco: Never   Substance Use Topics    Alcohol use: Not Currently     family history includes No Known Problems in his brother, father, mother, and sister.    Current Outpatient Medications:     amLODIPine (Norvasc) 10 mg tablet, Take 1 tablet (10 mg) by mouth once daily., Disp: 90 tablet, Rfl: 3    aspirin 81 mg EC tablet, TAKE 1 TABLET BY MOUTH EVERY DAY, Disp: 90 tablet, Rfl: 1    carvedilol (Coreg) 12.5 mg tablet, Take 1 tablet (12.5 mg) by mouth 2 times a day with meals., Disp: 180 tablet, Rfl: 3    chlorthalidone (Hygroton) 25 mg tablet, Take 1 tablet (25 mg) by mouth once daily., Disp: 90 tablet, Rfl: 3    dicyclomine (Bentyl) 20 mg tablet, Take 1 tablet (20 mg) by mouth 4 times a day before meals., Disp: 30 tablet, Rfl: 0    gabapentin (Neurontin) 100 mg capsule, Take 3 capsules (300 mg) by mouth 3 times a day., Disp: 90 capsule, Rfl: 2    ibuprofen 200 mg tablet, Take 2 tablets (400 mg) by mouth every 6 hours if needed for mild pain (1 - 3)., Disp: , Rfl:     ibuprofen-diphenhydramine HCl (Ibuprofen PM) 200-25 mg capsule per capsule, Take 2 capsules by mouth once daily at bedtime., Disp: , Rfl:     ondansetron ODT (Zofran-ODT) 4 mg disintegrating tablet, Take 1 tablet (4 mg) by mouth every 8 hours if needed for nausea or vomiting., Disp: 30 tablet, Rfl: 0    pantoprazole (ProtoNix) 40 mg EC tablet, Take 1 tablet (40 mg) by mouth 2 times a day. Do not crush, chew, or split., Disp: 60 tablet, Rfl: 11    cyclobenzaprine (Flexeril) 10 mg tablet, Take 1 tablet (10 mg) by mouth 3 times a day as needed for muscle spasms for up to 10 days., Disp: 30 tablet, Rfl: 0  Allergies   Allergen Reactions    Penicillins Swelling and Other       Physical Examination    General: Well developed, awake/alert/oriented x3, no distress, alert and cooperative  Skin: Warm and dry, no lesions, no rashes  ENMT: Mucous membranes moist, no apparent injury, no lesions seen  Head/Neck: Neck  Supple, no apparent injury  Respiratory/Thorax: Normal breath sounds with good chest expansion, thorax symmetric  Cardiovascular: No pitting edema, no JVD    Motor Strength: 5/5 Throughout all extremities    Muscle Bulk: Normal and symmetric in all extremities    Posture:   -- Cervical: Normal  -- Thoracic: Normal  -- Lumbar : Normal  Paraspinal muscle spasm/tenderness absent.     Sensation: intact to light touch    Results    I personally reviewed and interpreted the imaging results which included CT cervical spine which demonstrated cervical spondylosis with spinal canal and bilateral foraminal stenosis without acute fracture or facet.  MRI cervical demonstrated spinal canal is stenosed on a congenital basis with large disc herniations and uncovertebral spurring from C2-3 through C6-7 with severe stenosis.     Assessment and Plan:    Jesus Rand is a 64 y.o. year old male who presents to the spine clinic with right side chest pain that radiates down the right arm for the past 3 months. He describes his symptoms as intermittent aching.  He visited the ED on 11/19/24 for this and received a full cardiac workup with cervical spine imaging.  Cardiac workup was negative and he was diagnosed with cervical stenosis.  Endorses pain with flexion around C3-C5.  He does regular desk/computer work which aggravates his pain when he adjusts his head to look through his bifocals.  He has previously tried gabapentin 100 mg 3 times a day as well as 300 mg 3 times a day which did not provide relief.  Has been in PT for 6 weeks focusing on traction and HEP which provides benefit with numbness but continues to experience pain.     I have reviewed imaging and diagnosis with the patient, discussed the natural history of their disease and both non-operative and operative treatments available and rationale vs risks for both.    The patient’s clinical symptoms correlates well with the radiological findings. Patient has been having  significant functional impairment with decreased ability to perform her normal activities of daily living. They have tried treatment options including medications (NSAIDs/narcotics/muscle relaxants/membrane stabilizers), formal physical therapy, and injections.    I offered the option of surgery that would consist of a posterior cervical C7-T1 laminotomy, peritomy, and discectomy.     While there is no evidence of instability preoperatively on imaging, a cervical decompression alone has a 20% chance of post-laminectomy kyphosis due to the instability created by removal of the posterior elements including the spinous process, interspinous ligament, and lamina. I counseled the patient that an instrumented fusion with autograft and allograft would be necessary for long-term pain relief and stability is needed.    I have explained the surgical procedure in detail with expected duration and extent of recovery along risks of surgery that include, but is not limited to bleeding, infection, blood vessel injury or damage, loss of sensation, loss of bladder, bowel or sexual function, nerve injury/damage resulting in weakness/paralysis, malunion, nonunion, CSF leak, brachial plexus injury, peripheral vision blindness, failure of implants/fusion, failure to relieve symptoms, recurrent disease, adjacent segment disease, need to reoperate for any reason and general anesthesia reaction such as stroke, coma, heart attack, delirium, confusion, death as well as worsening of preexisted medical conditions. I have also discussed with the patient the chances of C5 palsy.    I clearly emphasized that while the goal of surgery is to decompress the spinal cord so as to ARREST the progression of neurological deficits - preexisting deficits may or may not improve after surgery. We discussed that up to 90% of patients do show clinically significant improvement in functional and neurological outcomes following decompression of the spinal cord  in patients with cervical degenerative myelopathy or radiculopathy the extent of which is variable and depends on the severity of myelopathy, duration of deficits and age of the patient.    All questions were answered and the patient left satisfied with the surgical plan moving forward.    I have reviewed all prior documentation and reviewed the electronic medical record since admission. I have personally have reviewed all advanced imaging not just the reports and used my interpretation as documented as the relevant findings. I have reviewed the risks and benefits of all treatment recommendations listed in this note with the patient and family.     The above clinical summary has been dictated with voice recognition software. It has not been proofread for grammatical errors, typographical mistakes, or other semantic inconsistencies.    Thank you for visiting our office today. It was our pleasure to take part in your healthcare.     Do not hesitate to call with any questions regarding your plan of care after leaving at [phone] M-F 8am-4pm.     To clinicians, thank you very much for this kind referral. It is a privilege to partner with you in the care of your patients. My office would be delighted to assist you with any further consultations or with questions regarding the plan of care outlined. Do not hesitate to call the office or contact me directly.       Sincerely,      Ravi Bellamy MD, [facility]  Spine , [facility]  Kailash Webber Chair in Spinal Neurosurgery  Complex Spine Surgery Fellowship Director   of Neurological Surgery  [facility] [employer]  Phone: [phone]  Fax: [phone]        Scribe Attestation  By signing my name below, I, Bailey Razo   attest that this documentation has been prepared under the direction and in the presence of Ravi Bellamy MD.

## 2025-01-31 NOTE — PROGRESS NOTES
Performed peer to peer meeting with the patient's insurance company.  His MRI is now approved.  Approval #[id number]

## 2025-02-04 ENCOUNTER — HOSPITAL ENCOUNTER (OUTPATIENT)
Dept: RADIOLOGY | Facility: HOSPITAL | Age: 65
Discharge: HOME | End: 2025-02-04
Payer: COMMERCIAL

## 2025-02-04 DIAGNOSIS — M43.02 CERVICAL SPONDYLOLYSIS: ICD-10-CM

## 2025-02-04 PROCEDURE — 72141 MRI NECK SPINE W/O DYE: CPT | Performed by: RADIOLOGY

## 2025-02-04 PROCEDURE — 72141 MRI NECK SPINE W/O DYE: CPT

## 2025-02-17 ENCOUNTER — OFFICE VISIT (OUTPATIENT)
Facility: CLINIC | Age: 65
End: 2025-02-17
Payer: COMMERCIAL

## 2025-02-17 VITALS
HEART RATE: 65 BPM | BODY MASS INDEX: 30.8 KG/M2 | WEIGHT: 220 LBS | TEMPERATURE: 97.5 F | SYSTOLIC BLOOD PRESSURE: 142 MMHG | HEIGHT: 71 IN | DIASTOLIC BLOOD PRESSURE: 82 MMHG

## 2025-02-17 DIAGNOSIS — M48.02 SPINAL STENOSIS OF CERVICAL REGION: ICD-10-CM

## 2025-02-17 DIAGNOSIS — M48.02 FORAMINAL STENOSIS OF CERVICAL REGION: ICD-10-CM

## 2025-02-17 PROCEDURE — 99205 OFFICE O/P NEW HI 60 MIN: CPT | Performed by: STUDENT IN AN ORGANIZED HEALTH CARE EDUCATION/TRAINING PROGRAM

## 2025-02-17 PROCEDURE — 3008F BODY MASS INDEX DOCD: CPT | Performed by: STUDENT IN AN ORGANIZED HEALTH CARE EDUCATION/TRAINING PROGRAM

## 2025-02-17 PROCEDURE — 3079F DIAST BP 80-89 MM HG: CPT | Performed by: STUDENT IN AN ORGANIZED HEALTH CARE EDUCATION/TRAINING PROGRAM

## 2025-02-17 PROCEDURE — 3077F SYST BP >= 140 MM HG: CPT | Performed by: STUDENT IN AN ORGANIZED HEALTH CARE EDUCATION/TRAINING PROGRAM

## 2025-02-17 PROCEDURE — 1036F TOBACCO NON-USER: CPT | Performed by: STUDENT IN AN ORGANIZED HEALTH CARE EDUCATION/TRAINING PROGRAM

## 2025-02-17 PROCEDURE — 99215 OFFICE O/P EST HI 40 MIN: CPT | Performed by: STUDENT IN AN ORGANIZED HEALTH CARE EDUCATION/TRAINING PROGRAM

## 2025-02-17 ASSESSMENT — PAIN SCALES - GENERAL: PAINLEVEL_OUTOF10: 7

## 2025-02-18 DIAGNOSIS — M48.02 FORAMINAL STENOSIS OF CERVICAL REGION: Primary | ICD-10-CM

## 2025-02-18 RX ORDER — TRANEXAMIC ACID 650 MG/1
1300 TABLET ORAL ONCE
OUTPATIENT
Start: 2025-02-18 | End: 2025-02-18

## 2025-02-18 RX ORDER — CELECOXIB 400 MG/1
400 CAPSULE ORAL ONCE
OUTPATIENT
Start: 2025-02-18 | End: 2025-02-18

## 2025-02-18 RX ORDER — ACETAMINOPHEN 325 MG/1
975 TABLET ORAL ONCE
OUTPATIENT
Start: 2025-02-18 | End: 2025-02-18

## 2025-02-24 ENCOUNTER — APPOINTMENT (OUTPATIENT)
Dept: RADIOLOGY | Facility: HOSPITAL | Age: 65
End: 2025-02-24
Payer: COMMERCIAL

## 2025-02-24 ENCOUNTER — HOSPITAL ENCOUNTER (INPATIENT)
Facility: HOSPITAL | Age: 65
LOS: 2 days | Discharge: HOME | End: 2025-02-26
Attending: INTERNAL MEDICINE | Admitting: INTERNAL MEDICINE
Payer: COMMERCIAL

## 2025-02-24 DIAGNOSIS — R10.84 ABDOMINAL PAIN, GENERALIZED: ICD-10-CM

## 2025-02-24 DIAGNOSIS — K44.9 HIATAL HERNIA: ICD-10-CM

## 2025-02-24 DIAGNOSIS — E86.0 DEHYDRATION: ICD-10-CM

## 2025-02-24 DIAGNOSIS — D72.829 LEUKOCYTOSIS, UNSPECIFIED TYPE: ICD-10-CM

## 2025-02-24 DIAGNOSIS — R79.89 ELEVATED LACTIC ACID LEVEL: ICD-10-CM

## 2025-02-24 DIAGNOSIS — K52.9 COLITIS: Primary | ICD-10-CM

## 2025-02-24 LAB
ALBUMIN SERPL BCP-MCNC: 4.8 G/DL (ref 3.4–5)
ALP SERPL-CCNC: 64 U/L (ref 33–136)
ALT SERPL W P-5'-P-CCNC: 14 U/L (ref 10–52)
ANION GAP SERPL CALC-SCNC: 21 MMOL/L (ref 10–20)
APPEARANCE UR: CLEAR
AST SERPL W P-5'-P-CCNC: 16 U/L (ref 9–39)
BASOPHILS # BLD AUTO: 0.04 X10*3/UL (ref 0–0.1)
BASOPHILS NFR BLD AUTO: 0.2 %
BILIRUB DIRECT SERPL-MCNC: 0.1 MG/DL (ref 0–0.3)
BILIRUB SERPL-MCNC: 1.5 MG/DL (ref 0–1.2)
BILIRUB UR STRIP.AUTO-MCNC: NEGATIVE MG/DL
BUN SERPL-MCNC: 22 MG/DL (ref 6–23)
CALCIUM SERPL-MCNC: 9.9 MG/DL (ref 8.6–10.3)
CHLORIDE SERPL-SCNC: 102 MMOL/L (ref 98–107)
CO2 SERPL-SCNC: 19 MMOL/L (ref 21–32)
COLOR UR: ABNORMAL
CREAT SERPL-MCNC: 1.03 MG/DL (ref 0.5–1.3)
EGFRCR SERPLBLD CKD-EPI 2021: 82 ML/MIN/1.73M*2
EOSINOPHIL # BLD AUTO: 0.06 X10*3/UL (ref 0–0.7)
EOSINOPHIL NFR BLD AUTO: 0.4 %
ERYTHROCYTE [DISTWIDTH] IN BLOOD BY AUTOMATED COUNT: 12.1 % (ref 11.5–14.5)
GLUCOSE SERPL-MCNC: 167 MG/DL (ref 74–99)
GLUCOSE UR STRIP.AUTO-MCNC: ABNORMAL MG/DL
HCT VFR BLD AUTO: 41.5 % (ref 41–52)
HGB BLD-MCNC: 14.4 G/DL (ref 13.5–17.5)
IMM GRANULOCYTES # BLD AUTO: 0.08 X10*3/UL (ref 0–0.7)
IMM GRANULOCYTES NFR BLD AUTO: 0.5 % (ref 0–0.9)
KETONES UR STRIP.AUTO-MCNC: ABNORMAL MG/DL
LACTATE SERPL-SCNC: 1.8 MMOL/L (ref 0.4–2)
LACTATE SERPL-SCNC: 4.8 MMOL/L (ref 0.4–2)
LEUKOCYTE ESTERASE UR QL STRIP.AUTO: NEGATIVE
LIPASE SERPL-CCNC: 5 U/L (ref 9–82)
LYMPHOCYTES # BLD AUTO: 0.68 X10*3/UL (ref 1.2–4.8)
LYMPHOCYTES NFR BLD AUTO: 4.1 %
MCH RBC QN AUTO: 30.6 PG (ref 26–34)
MCHC RBC AUTO-ENTMCNC: 34.7 G/DL (ref 32–36)
MCV RBC AUTO: 88 FL (ref 80–100)
MONOCYTES # BLD AUTO: 0.6 X10*3/UL (ref 0.1–1)
MONOCYTES NFR BLD AUTO: 3.6 %
MUCOUS THREADS #/AREA URNS AUTO: NORMAL /LPF
NEUTROPHILS # BLD AUTO: 15.09 X10*3/UL (ref 1.2–7.7)
NEUTROPHILS NFR BLD AUTO: 91.2 %
NITRITE UR QL STRIP.AUTO: NEGATIVE
NRBC BLD-RTO: 0 /100 WBCS (ref 0–0)
PH UR STRIP.AUTO: 7.5 [PH]
PLATELET # BLD AUTO: 332 X10*3/UL (ref 150–450)
POTASSIUM SERPL-SCNC: 3.7 MMOL/L (ref 3.5–5.3)
PROT SERPL-MCNC: 7.9 G/DL (ref 6.4–8.2)
PROT UR STRIP.AUTO-MCNC: ABNORMAL MG/DL
RBC # BLD AUTO: 4.7 X10*6/UL (ref 4.5–5.9)
RBC # UR STRIP.AUTO: NEGATIVE MG/DL
RBC #/AREA URNS AUTO: NORMAL /HPF
SODIUM SERPL-SCNC: 138 MMOL/L (ref 136–145)
SP GR UR STRIP.AUTO: 1.02
UROBILINOGEN UR STRIP.AUTO-MCNC: NORMAL MG/DL
WBC # BLD AUTO: 16.6 X10*3/UL (ref 4.4–11.3)
WBC #/AREA URNS AUTO: NORMAL /HPF

## 2025-02-24 PROCEDURE — 2500000004 HC RX 250 GENERAL PHARMACY W/ HCPCS (ALT 636 FOR OP/ED): Performed by: EMERGENCY MEDICINE

## 2025-02-24 PROCEDURE — 2500000004 HC RX 250 GENERAL PHARMACY W/ HCPCS (ALT 636 FOR OP/ED): Performed by: PHYSICIAN ASSISTANT

## 2025-02-24 PROCEDURE — 82248 BILIRUBIN DIRECT: CPT | Performed by: PHYSICIAN ASSISTANT

## 2025-02-24 PROCEDURE — 2500000005 HC RX 250 GENERAL PHARMACY W/O HCPCS: Performed by: PHYSICIAN ASSISTANT

## 2025-02-24 PROCEDURE — 96375 TX/PRO/DX INJ NEW DRUG ADDON: CPT

## 2025-02-24 PROCEDURE — 74177 CT ABD & PELVIS W/CONTRAST: CPT | Mod: FOREIGN READ | Performed by: RADIOLOGY

## 2025-02-24 PROCEDURE — 36415 COLL VENOUS BLD VENIPUNCTURE: CPT | Performed by: PHYSICIAN ASSISTANT

## 2025-02-24 PROCEDURE — 87040 BLOOD CULTURE FOR BACTERIA: CPT | Mod: SAMLAB | Performed by: PHYSICIAN ASSISTANT

## 2025-02-24 PROCEDURE — 96365 THER/PROPH/DIAG IV INF INIT: CPT

## 2025-02-24 PROCEDURE — 80053 COMPREHEN METABOLIC PANEL: CPT | Performed by: PHYSICIAN ASSISTANT

## 2025-02-24 PROCEDURE — 2550000001 HC RX 255 CONTRASTS: Performed by: PHYSICIAN ASSISTANT

## 2025-02-24 PROCEDURE — 96376 TX/PRO/DX INJ SAME DRUG ADON: CPT

## 2025-02-24 PROCEDURE — 94664 DEMO&/EVAL PT USE INHALER: CPT

## 2025-02-24 PROCEDURE — 96361 HYDRATE IV INFUSION ADD-ON: CPT

## 2025-02-24 PROCEDURE — 2500000004 HC RX 250 GENERAL PHARMACY W/ HCPCS (ALT 636 FOR OP/ED)

## 2025-02-24 PROCEDURE — 2500000001 HC RX 250 WO HCPCS SELF ADMINISTERED DRUGS (ALT 637 FOR MEDICARE OP): Performed by: PHYSICIAN ASSISTANT

## 2025-02-24 PROCEDURE — 85025 COMPLETE CBC W/AUTO DIFF WBC: CPT | Performed by: PHYSICIAN ASSISTANT

## 2025-02-24 PROCEDURE — 99223 1ST HOSP IP/OBS HIGH 75: CPT | Performed by: INTERNAL MEDICINE

## 2025-02-24 PROCEDURE — 96368 THER/DIAG CONCURRENT INF: CPT

## 2025-02-24 PROCEDURE — 99285 EMERGENCY DEPT VISIT HI MDM: CPT | Mod: 25

## 2025-02-24 PROCEDURE — 83605 ASSAY OF LACTIC ACID: CPT | Performed by: PHYSICIAN ASSISTANT

## 2025-02-24 PROCEDURE — 81001 URINALYSIS AUTO W/SCOPE: CPT | Performed by: PHYSICIAN ASSISTANT

## 2025-02-24 PROCEDURE — 94762 N-INVAS EAR/PLS OXIMTRY CONT: CPT

## 2025-02-24 PROCEDURE — 9420000001 HC RT PATIENT EDUCATION 5 MIN

## 2025-02-24 PROCEDURE — 74177 CT ABD & PELVIS W/CONTRAST: CPT

## 2025-02-24 PROCEDURE — 1100000001 HC PRIVATE ROOM DAILY

## 2025-02-24 PROCEDURE — 83690 ASSAY OF LIPASE: CPT | Performed by: PHYSICIAN ASSISTANT

## 2025-02-24 RX ORDER — MORPHINE SULFATE 4 MG/ML
INJECTION, SOLUTION INTRAMUSCULAR; INTRAVENOUS
Status: COMPLETED
Start: 2025-02-24 | End: 2025-02-24

## 2025-02-24 RX ORDER — ONDANSETRON HYDROCHLORIDE 2 MG/ML
4 INJECTION, SOLUTION INTRAVENOUS ONCE
Status: COMPLETED | OUTPATIENT
Start: 2025-02-24 | End: 2025-02-24

## 2025-02-24 RX ORDER — MORPHINE SULFATE 4 MG/ML
4 INJECTION, SOLUTION INTRAMUSCULAR; INTRAVENOUS ONCE
Status: COMPLETED | OUTPATIENT
Start: 2025-02-24 | End: 2025-02-24

## 2025-02-24 RX ORDER — ONDANSETRON HYDROCHLORIDE 2 MG/ML
INJECTION, SOLUTION INTRAVENOUS
Status: COMPLETED
Start: 2025-02-24 | End: 2025-02-24

## 2025-02-24 RX ORDER — CIPROFLOXACIN 2 MG/ML
400 INJECTION, SOLUTION INTRAVENOUS ONCE
Status: COMPLETED | OUTPATIENT
Start: 2025-02-24 | End: 2025-02-24

## 2025-02-24 RX ORDER — METRONIDAZOLE 500 MG/100ML
500 INJECTION, SOLUTION INTRAVENOUS ONCE
Status: COMPLETED | OUTPATIENT
Start: 2025-02-24 | End: 2025-02-24

## 2025-02-24 RX ORDER — METRONIDAZOLE 500 MG/1
500 TABLET ORAL 3 TIMES DAILY
Qty: 30 TABLET | Refills: 0 | Status: SHIPPED | OUTPATIENT
Start: 2025-02-24 | End: 2025-02-26 | Stop reason: HOSPADM

## 2025-02-24 RX ORDER — LABETALOL HYDROCHLORIDE 5 MG/ML
10 INJECTION, SOLUTION INTRAVENOUS ONCE
Status: COMPLETED | OUTPATIENT
Start: 2025-02-24 | End: 2025-02-24

## 2025-02-24 RX ORDER — CIPROFLOXACIN 500 MG/1
500 TABLET ORAL 2 TIMES DAILY
Qty: 20 TABLET | Refills: 0 | Status: SHIPPED | OUTPATIENT
Start: 2025-02-24 | End: 2025-02-26 | Stop reason: HOSPADM

## 2025-02-24 RX ORDER — METRONIDAZOLE 500 MG/1
500 TABLET ORAL ONCE
Status: COMPLETED | OUTPATIENT
Start: 2025-02-24 | End: 2025-02-24

## 2025-02-24 RX ORDER — CIPROFLOXACIN 500 MG/1
500 TABLET ORAL ONCE
Status: COMPLETED | OUTPATIENT
Start: 2025-02-24 | End: 2025-02-24

## 2025-02-24 RX ADMIN — CIPROFLOXACIN 400 MG: 2 INJECTION, SOLUTION INTRAVENOUS at 21:35

## 2025-02-24 RX ADMIN — CIPROFLOXACIN 500 MG: 500 TABLET ORAL at 20:29

## 2025-02-24 RX ADMIN — ONDANSETRON 4 MG: 2 INJECTION INTRAMUSCULAR; INTRAVENOUS at 16:31

## 2025-02-24 RX ADMIN — ONDANSETRON 4 MG: 2 INJECTION INTRAMUSCULAR; INTRAVENOUS at 21:09

## 2025-02-24 RX ADMIN — MORPHINE SULFATE 4 MG: 4 INJECTION, SOLUTION INTRAMUSCULAR; INTRAVENOUS at 21:09

## 2025-02-24 RX ADMIN — METRONIDAZOLE 500 MG: 500 INJECTION, SOLUTION INTRAVENOUS at 21:36

## 2025-02-24 RX ADMIN — METRONIDAZOLE 500 MG: 500 TABLET ORAL at 20:29

## 2025-02-24 RX ADMIN — SODIUM CHLORIDE 500 ML: 9 INJECTION, SOLUTION INTRAVENOUS at 18:49

## 2025-02-24 RX ADMIN — LABETALOL HYDROCHLORIDE 10 MG: 5 INJECTION, SOLUTION INTRAVENOUS at 21:51

## 2025-02-24 RX ADMIN — Medication 2 L/MIN: at 17:09

## 2025-02-24 RX ADMIN — Medication 2 L/MIN: at 22:05

## 2025-02-24 RX ADMIN — MORPHINE SULFATE 4 MG: 4 INJECTION, SOLUTION INTRAMUSCULAR; INTRAVENOUS at 18:00

## 2025-02-24 RX ADMIN — SODIUM CHLORIDE 1000 ML: 9 INJECTION, SOLUTION INTRAVENOUS at 16:38

## 2025-02-24 RX ADMIN — MORPHINE SULFATE 4 MG: 4 INJECTION, SOLUTION INTRAMUSCULAR; INTRAVENOUS at 16:32

## 2025-02-24 RX ADMIN — IOHEXOL 68 ML: 350 INJECTION, SOLUTION INTRAVENOUS at 17:33

## 2025-02-24 RX ADMIN — ONDANSETRON 4 MG: 2 INJECTION INTRAMUSCULAR; INTRAVENOUS at 17:58

## 2025-02-24 ASSESSMENT — ENCOUNTER SYMPTOMS
PALPITATIONS: 0
SORE THROAT: 0
BACK PAIN: 0
VOMITING: 1
NAUSEA: 1
HEMATURIA: 0
EYE PAIN: 0
ABDOMINAL PAIN: 1
ARTHRALGIAS: 0
COLOR CHANGE: 0
SEIZURES: 0
DYSURIA: 0
CHILLS: 0
FEVER: 0
COUGH: 0
SHORTNESS OF BREATH: 0

## 2025-02-24 ASSESSMENT — PAIN - FUNCTIONAL ASSESSMENT
PAIN_FUNCTIONAL_ASSESSMENT: 0-10

## 2025-02-24 ASSESSMENT — PAIN SCALES - GENERAL
PAINLEVEL_OUTOF10: 9
PAINLEVEL_OUTOF10: 6
PAINLEVEL_OUTOF10: 10 - WORST POSSIBLE PAIN
PAINLEVEL_OUTOF10: 3
PAINLEVEL_OUTOF10: 7
PAINLEVEL_OUTOF10: 4
PAINLEVEL_OUTOF10: 2
PAINLEVEL_OUTOF10: 0 - NO PAIN

## 2025-02-24 ASSESSMENT — COGNITIVE AND FUNCTIONAL STATUS - GENERAL
PATIENT BASELINE BEDBOUND: NO
DAILY ACTIVITIY SCORE: 24
MOBILITY SCORE: 24

## 2025-02-24 ASSESSMENT — ACTIVITIES OF DAILY LIVING (ADL)
LACK_OF_TRANSPORTATION: NO
HEARING - RIGHT EAR: FUNCTIONAL
ADEQUATE_TO_COMPLETE_ADL: YES
GROOMING: INDEPENDENT
JUDGMENT_ADEQUATE_SAFELY_COMPLETE_DAILY_ACTIVITIES: YES
DRESSING YOURSELF: INDEPENDENT
PATIENT'S MEMORY ADEQUATE TO SAFELY COMPLETE DAILY ACTIVITIES?: YES
FEEDING YOURSELF: INDEPENDENT
HEARING - LEFT EAR: FUNCTIONAL
WALKS IN HOME: INDEPENDENT
BATHING: INDEPENDENT
TOILETING: INDEPENDENT

## 2025-02-24 ASSESSMENT — PAIN DESCRIPTION - PROGRESSION: CLINICAL_PROGRESSION: GRADUALLY IMPROVING

## 2025-02-24 ASSESSMENT — COLUMBIA-SUICIDE SEVERITY RATING SCALE - C-SSRS
6. HAVE YOU EVER DONE ANYTHING, STARTED TO DO ANYTHING, OR PREPARED TO DO ANYTHING TO END YOUR LIFE?: NO
1. IN THE PAST MONTH, HAVE YOU WISHED YOU WERE DEAD OR WISHED YOU COULD GO TO SLEEP AND NOT WAKE UP?: NO
2. HAVE YOU ACTUALLY HAD ANY THOUGHTS OF KILLING YOURSELF?: NO

## 2025-02-24 ASSESSMENT — PAIN DESCRIPTION - ORIENTATION: ORIENTATION: LOWER

## 2025-02-24 ASSESSMENT — PAIN DESCRIPTION - LOCATION: LOCATION: ABDOMEN

## 2025-02-24 NOTE — ED PROVIDER NOTES
Patient is a 64-year-old male who presents to the emergency department with a chief complaint of generalized abdominal pain, nausea and vomiting.  He does admit to having a few episodes of diarrhea this morning. He states that his symptoms started at around 7:00 this morning. Patient states that this has happened in the past and they have never determined the cause. He states that when this happens the only relief that he get is sitting in a warm bathtub. No chest pain or shortness of breath.  He reports that he recently had an upper and a lower endoscopy that was unremarkable.  He states that morphine is the only medicine that has seemed to help it in the past.           Review of Systems   Constitutional:  Negative for chills and fever.   HENT:  Negative for ear pain and sore throat.    Eyes:  Negative for pain and visual disturbance.   Respiratory:  Negative for cough and shortness of breath.    Cardiovascular:  Negative for chest pain and palpitations.   Gastrointestinal:  Positive for abdominal pain, nausea and vomiting.   Genitourinary:  Negative for dysuria and hematuria.   Musculoskeletal:  Negative for arthralgias and back pain.   Skin:  Negative for color change and rash.   Neurological:  Negative for seizures and syncope.   All other systems reviewed and are negative.       Physical Exam  Vitals and nursing note reviewed.   Constitutional:       General: He is not in acute distress.     Appearance: He is well-developed. He is not ill-appearing or toxic-appearing.   HENT:      Head: Normocephalic and atraumatic.   Eyes:      Extraocular Movements: Extraocular movements intact.      Conjunctiva/sclera: Conjunctivae normal.      Pupils: Pupils are equal, round, and reactive to light.   Cardiovascular:      Rate and Rhythm: Normal rate and regular rhythm.      Heart sounds: No murmur heard.  Pulmonary:      Effort: Pulmonary effort is normal. No respiratory distress.      Breath sounds: Normal breath sounds.  No stridor. No wheezing, rhonchi or rales.   Abdominal:      Palpations: Abdomen is soft.      Tenderness: There is generalized abdominal tenderness.      Hernia: No hernia is present.   Musculoskeletal:         General: No swelling.      Cervical back: Neck supple.   Skin:     General: Skin is warm and dry.      Capillary Refill: Capillary refill takes less than 2 seconds.   Neurological:      General: No focal deficit present.      Mental Status: He is alert.   Psychiatric:         Mood and Affect: Mood normal.          Labs Reviewed   CBC WITH AUTO DIFFERENTIAL - Abnormal       Result Value    WBC 16.6 (*)     nRBC 0.0      RBC 4.70      Hemoglobin 14.4      Hematocrit 41.5      MCV 88      MCH 30.6      MCHC 34.7      RDW 12.1      Platelets 332      Neutrophils % 91.2      Immature Granulocytes %, Automated 0.5      Lymphocytes % 4.1      Monocytes % 3.6      Eosinophils % 0.4      Basophils % 0.2      Neutrophils Absolute 15.09 (*)     Immature Granulocytes Absolute, Automated 0.08      Lymphocytes Absolute 0.68 (*)     Monocytes Absolute 0.60      Eosinophils Absolute 0.06      Basophils Absolute 0.04     BASIC METABOLIC PANEL - Abnormal    Glucose 167 (*)     Sodium 138      Potassium 3.7      Chloride 102      Bicarbonate 19 (*)     Anion Gap 21 (*)     Urea Nitrogen 22      Creatinine 1.03      eGFR 82      Calcium 9.9     LIPASE - Abnormal    Lipase 5 (*)     Narrative:     Venipuncture immediately after or during the administration of Metamizole may lead to falsely low results. Testing should be performed immediately prior to Metamizole dosing.   HEPATIC FUNCTION PANEL - Abnormal    Albumin 4.8      Bilirubin, Total 1.5 (*)     Bilirubin, Direct 0.1      Alkaline Phosphatase 64      ALT 14      AST 16      Total Protein 7.9     LACTATE - Abnormal    Lactate 4.8 (*)     Narrative:     Venipuncture immediately after or during the administration of Metamizole may lead to falsely low results. Testing should  be performed immediately prior to Metamizole dosing.   URINALYSIS WITH REFLEX CULTURE AND MICROSCOPIC - Abnormal    Color, Urine Light-Yellow      Appearance, Urine Clear      Specific Gravity, Urine 1.023      pH, Urine 7.5      Protein, Urine 100 (2+) (*)     Glucose, Urine 50 (TRACE) (*)     Blood, Urine NEGATIVE      Ketones, Urine 150 (4+) (*)     Bilirubin, Urine NEGATIVE      Urobilinogen, Urine Normal      Nitrite, Urine NEGATIVE      Leukocyte Esterase, Urine NEGATIVE     LACTATE - Normal    Lactate 1.8      Narrative:     Venipuncture immediately after or during the administration of Metamizole may lead to falsely low results. Testing should be performed immediately prior to Metamizole dosing.   BLOOD CULTURE   BLOOD CULTURE   URINALYSIS WITH REFLEX CULTURE AND MICROSCOPIC    Narrative:     The following orders were created for panel order Urinalysis with Reflex Culture and Microscopic.  Procedure                               Abnormality         Status                     ---------                               -----------         ------                     Urinalysis with Reflex C...[[id number]]  Abnormal            Final result               Extra Urine Gray Tube[[id number]]                            In process                   Please view results for these tests on the individual orders.   EXTRA URINE GRAY TUBE   URINALYSIS MICROSCOPIC WITH REFLEX CULTURE    WBC, Urine NONE      RBC, Urine 3-5      Mucus, Urine FEW          CT abdomen pelvis w IV contrast   Final Result   Scattered areas of wall thickening of the colon compatible with areas   of colitis.   Mild wall thickening of the urinary bladder. Correlate clinically for   cystitis.   Small hiatal hernia.   Signed by Raciel Stockton MD           Procedures     Medical Decision Making  Patient is a 64-year-old male who presents to the emergency department with a chief complaint of generalized abdominal pain, nausea, vomiting and a few episodes of  diarrhea this morning.  With onset of symptoms at 7:00.  Patient does admit that this has happened in the past.  He is seeing GI for similar symptoms and had an upper endoscopy which was unremarkable.  Patient was treated with IV analgesics.  He reported improvement of his symptoms.  CT scan of the abdomen pelvis shows findings consistent with colitis.  Patient was noted to have an elevated lactic acid at 4.8 originally and this did improve with IV hydration to 1.8.  He has leukocytosis of 16.6.  He is afebrile and nontoxic-appearing.  Nontachycardic.  Urinalysis shows no signs of infection.  He does have lab work consistent with dehydration.  I discussed with patient along with family in the room that ultimately I recommend admission given his leukocytosis and his elevated lactic acid and findings of colitis. Patient states that his symptoms are resolved and he would like to go home. He does not want to be admitted. We discussed returning immediately if symptoms return and following up with gastroenterology    2045: Patient was given oral antibiotics prior to planned discharge. He drank water and took flagyl and cipro and immediately vomited afterwards. Reports generalized abdominal cramping. Patient no longer feels that he can go home given his symptoms. He will be admitted to the hospital    Differential diagnosis includes but not limited to gastroenteritis, appendicitis, colitis, diverticulitis, pancreatitis, ischemic bowel, UTI, UC, Crohn's disease    Amount and/or Complexity of Data Reviewed  Labs: ordered. Decision-making details documented in ED Course.  Radiology: ordered. Decision-making details documented in ED Course.    Risk  Prescription drug management.         Diagnoses as of 02/24/25 2138   Colitis   Abdominal pain, generalized   Dehydration   Hiatal hernia   Leukocytosis, unspecified type   Elevated lactic acid level                    Marely Boles PA-C  02/24/25 2138

## 2025-02-25 LAB
ANION GAP SERPL CALC-SCNC: 12 MMOL/L (ref 10–20)
BUN SERPL-MCNC: 19 MG/DL (ref 6–23)
CALCIUM SERPL-MCNC: 8.3 MG/DL (ref 8.6–10.3)
CHLORIDE SERPL-SCNC: 105 MMOL/L (ref 98–107)
CO2 SERPL-SCNC: 23 MMOL/L (ref 21–32)
CREAT SERPL-MCNC: 0.92 MG/DL (ref 0.5–1.3)
EGFRCR SERPLBLD CKD-EPI 2021: >90 ML/MIN/1.73M*2
ERYTHROCYTE [DISTWIDTH] IN BLOOD BY AUTOMATED COUNT: 12.6 % (ref 11.5–14.5)
GLUCOSE SERPL-MCNC: 125 MG/DL (ref 74–99)
HCT VFR BLD AUTO: 35.2 % (ref 41–52)
HGB BLD-MCNC: 12.1 G/DL (ref 13.5–17.5)
HOLD SPECIMEN: NORMAL
MCH RBC QN AUTO: 31.3 PG (ref 26–34)
MCHC RBC AUTO-ENTMCNC: 34.4 G/DL (ref 32–36)
MCV RBC AUTO: 91 FL (ref 80–100)
NRBC BLD-RTO: 0 /100 WBCS (ref 0–0)
PLATELET # BLD AUTO: 254 X10*3/UL (ref 150–450)
POTASSIUM SERPL-SCNC: 3.6 MMOL/L (ref 3.5–5.3)
RBC # BLD AUTO: 3.87 X10*6/UL (ref 4.5–5.9)
SODIUM SERPL-SCNC: 136 MMOL/L (ref 136–145)
WBC # BLD AUTO: 12.3 X10*3/UL (ref 4.4–11.3)

## 2025-02-25 PROCEDURE — 2500000001 HC RX 250 WO HCPCS SELF ADMINISTERED DRUGS (ALT 637 FOR MEDICARE OP): Performed by: INTERNAL MEDICINE

## 2025-02-25 PROCEDURE — 99233 SBSQ HOSP IP/OBS HIGH 50: CPT | Performed by: STUDENT IN AN ORGANIZED HEALTH CARE EDUCATION/TRAINING PROGRAM

## 2025-02-25 PROCEDURE — 2500000005 HC RX 250 GENERAL PHARMACY W/O HCPCS

## 2025-02-25 PROCEDURE — 2500000005 HC RX 250 GENERAL PHARMACY W/O HCPCS: Performed by: INTERNAL MEDICINE

## 2025-02-25 PROCEDURE — 85027 COMPLETE CBC AUTOMATED: CPT | Performed by: INTERNAL MEDICINE

## 2025-02-25 PROCEDURE — 2500000001 HC RX 250 WO HCPCS SELF ADMINISTERED DRUGS (ALT 637 FOR MEDICARE OP)

## 2025-02-25 PROCEDURE — 94761 N-INVAS EAR/PLS OXIMETRY MLT: CPT

## 2025-02-25 PROCEDURE — 1100000001 HC PRIVATE ROOM DAILY

## 2025-02-25 PROCEDURE — 80048 BASIC METABOLIC PNL TOTAL CA: CPT | Performed by: INTERNAL MEDICINE

## 2025-02-25 PROCEDURE — 2500000004 HC RX 250 GENERAL PHARMACY W/ HCPCS (ALT 636 FOR OP/ED)

## 2025-02-25 PROCEDURE — 2500000005 HC RX 250 GENERAL PHARMACY W/O HCPCS: Performed by: PHYSICIAN ASSISTANT

## 2025-02-25 PROCEDURE — 36415 COLL VENOUS BLD VENIPUNCTURE: CPT | Performed by: INTERNAL MEDICINE

## 2025-02-25 PROCEDURE — 2500000004 HC RX 250 GENERAL PHARMACY W/ HCPCS (ALT 636 FOR OP/ED): Performed by: INTERNAL MEDICINE

## 2025-02-25 RX ORDER — ONDANSETRON 4 MG/1
4 TABLET, ORALLY DISINTEGRATING ORAL EVERY 8 HOURS PRN
Status: DISCONTINUED | OUTPATIENT
Start: 2025-02-25 | End: 2025-02-26 | Stop reason: HOSPADM

## 2025-02-25 RX ORDER — CIPROFLOXACIN 2 MG/ML
400 INJECTION, SOLUTION INTRAVENOUS EVERY 12 HOURS
Status: DISCONTINUED | OUTPATIENT
Start: 2025-02-25 | End: 2025-02-26 | Stop reason: HOSPADM

## 2025-02-25 RX ORDER — ACETAMINOPHEN 650 MG/1
650 SUPPOSITORY RECTAL EVERY 4 HOURS PRN
Status: DISCONTINUED | OUTPATIENT
Start: 2025-02-25 | End: 2025-02-26 | Stop reason: HOSPADM

## 2025-02-25 RX ORDER — ACETAMINOPHEN 325 MG/1
650 TABLET ORAL EVERY 4 HOURS PRN
Status: DISCONTINUED | OUTPATIENT
Start: 2025-02-25 | End: 2025-02-26 | Stop reason: HOSPADM

## 2025-02-25 RX ORDER — FAMOTIDINE 10 MG/ML
20 INJECTION, SOLUTION INTRAVENOUS EVERY 12 HOURS SCHEDULED
Status: DISCONTINUED | OUTPATIENT
Start: 2025-02-25 | End: 2025-02-26 | Stop reason: HOSPADM

## 2025-02-25 RX ORDER — CARVEDILOL 12.5 MG/1
12.5 TABLET ORAL
Status: DISCONTINUED | OUTPATIENT
Start: 2025-02-25 | End: 2025-02-26 | Stop reason: HOSPADM

## 2025-02-25 RX ORDER — MORPHINE SULFATE 2 MG/ML
2 INJECTION, SOLUTION INTRAMUSCULAR; INTRAVENOUS
Status: DISCONTINUED | OUTPATIENT
Start: 2025-02-25 | End: 2025-02-26 | Stop reason: HOSPADM

## 2025-02-25 RX ORDER — ONDANSETRON HYDROCHLORIDE 2 MG/ML
4 INJECTION, SOLUTION INTRAVENOUS EVERY 8 HOURS PRN
Status: DISCONTINUED | OUTPATIENT
Start: 2025-02-25 | End: 2025-02-26 | Stop reason: HOSPADM

## 2025-02-25 RX ORDER — ACETAMINOPHEN 160 MG/5ML
650 SOLUTION ORAL EVERY 4 HOURS PRN
Status: DISCONTINUED | OUTPATIENT
Start: 2025-02-25 | End: 2025-02-26 | Stop reason: HOSPADM

## 2025-02-25 RX ORDER — METRONIDAZOLE 500 MG/100ML
500 INJECTION, SOLUTION INTRAVENOUS EVERY 8 HOURS
Status: DISCONTINUED | OUTPATIENT
Start: 2025-02-25 | End: 2025-02-26 | Stop reason: HOSPADM

## 2025-02-25 RX ORDER — ASPIRIN 81 MG/1
81 TABLET ORAL DAILY
Status: DISCONTINUED | OUTPATIENT
Start: 2025-02-25 | End: 2025-02-26 | Stop reason: HOSPADM

## 2025-02-25 RX ORDER — CHLORTHALIDONE 25 MG/1
25 TABLET ORAL DAILY
Status: DISCONTINUED | OUTPATIENT
Start: 2025-02-25 | End: 2025-02-25

## 2025-02-25 RX ORDER — HYDROCHLOROTHIAZIDE 25 MG/1
25 TABLET ORAL DAILY
Status: DISCONTINUED | OUTPATIENT
Start: 2025-02-25 | End: 2025-02-26 | Stop reason: HOSPADM

## 2025-02-25 RX ORDER — ADHESIVE BANDAGE
30 BANDAGE TOPICAL DAILY PRN
Status: DISCONTINUED | OUTPATIENT
Start: 2025-02-25 | End: 2025-02-26 | Stop reason: HOSPADM

## 2025-02-25 RX ORDER — FAMOTIDINE 10 MG/ML
INJECTION, SOLUTION INTRAVENOUS
Status: COMPLETED
Start: 2025-02-25 | End: 2025-02-25

## 2025-02-25 RX ORDER — AMLODIPINE BESYLATE 10 MG/1
10 TABLET ORAL DAILY
Status: DISCONTINUED | OUTPATIENT
Start: 2025-02-25 | End: 2025-02-26 | Stop reason: HOSPADM

## 2025-02-25 RX ORDER — DICYCLOMINE HYDROCHLORIDE 10 MG/1
20 CAPSULE ORAL 4 TIMES DAILY PRN
Status: DISCONTINUED | OUTPATIENT
Start: 2025-02-25 | End: 2025-02-26 | Stop reason: HOSPADM

## 2025-02-25 RX ORDER — SODIUM CHLORIDE 9 MG/ML
75 INJECTION, SOLUTION INTRAVENOUS CONTINUOUS
Status: DISCONTINUED | OUTPATIENT
Start: 2025-02-25 | End: 2025-02-25

## 2025-02-25 RX ORDER — SODIUM CHLORIDE 9 MG/ML
INJECTION, SOLUTION INTRAMUSCULAR; INTRAVENOUS; SUBCUTANEOUS
Status: COMPLETED
Start: 2025-02-25 | End: 2025-02-25

## 2025-02-25 RX ADMIN — SODIUM CHLORIDE 75 ML/HR: 9 INJECTION, SOLUTION INTRAVENOUS at 08:56

## 2025-02-25 RX ADMIN — FAMOTIDINE 20 MG: 10 INJECTION, SOLUTION INTRAVENOUS at 08:55

## 2025-02-25 RX ADMIN — SODIUM CHLORIDE 75 ML/HR: 9 INJECTION, SOLUTION INTRAMUSCULAR; INTRAVENOUS; SUBCUTANEOUS at 08:56

## 2025-02-25 RX ADMIN — FAMOTIDINE 20 MG: 10 INJECTION, SOLUTION INTRAVENOUS at 20:41

## 2025-02-25 RX ADMIN — SODIUM CHLORIDE 10 ML: 9 INJECTION, SOLUTION INTRAMUSCULAR; INTRAVENOUS; SUBCUTANEOUS at 20:41

## 2025-02-25 RX ADMIN — ONDANSETRON 4 MG: 2 INJECTION INTRAMUSCULAR; INTRAVENOUS at 20:44

## 2025-02-25 RX ADMIN — CIPROFLOXACIN 400 MG: 2 INJECTION, SOLUTION INTRAVENOUS at 08:54

## 2025-02-25 RX ADMIN — SODIUM CHLORIDE 75 ML/HR: 9 INJECTION, SOLUTION INTRAVENOUS at 03:12

## 2025-02-25 RX ADMIN — CARVEDILOL 12.5 MG: 12.5 TABLET, FILM COATED ORAL at 08:54

## 2025-02-25 RX ADMIN — ACETAMINOPHEN 650 MG: 325 TABLET ORAL at 17:27

## 2025-02-25 RX ADMIN — METRONIDAZOLE 500 MG: 500 INJECTION, SOLUTION INTRAVENOUS at 05:10

## 2025-02-25 RX ADMIN — AMLODIPINE BESYLATE 10 MG: 10 TABLET ORAL at 08:54

## 2025-02-25 RX ADMIN — HYDROCHLOROTHIAZIDE 25 MG: 25 TABLET ORAL at 08:54

## 2025-02-25 RX ADMIN — CARVEDILOL 12.5 MG: 12.5 TABLET, FILM COATED ORAL at 16:36

## 2025-02-25 RX ADMIN — METRONIDAZOLE 500 MG: 500 INJECTION, SOLUTION INTRAVENOUS at 13:58

## 2025-02-25 RX ADMIN — ACETAMINOPHEN 650 MG: 325 TABLET ORAL at 11:24

## 2025-02-25 RX ADMIN — DICYCLOMINE HYDROCHLORIDE 20 MG: 10 CAPSULE ORAL at 23:01

## 2025-02-25 RX ADMIN — Medication 21 PERCENT: at 00:40

## 2025-02-25 RX ADMIN — CIPROFLOXACIN 400 MG: 2 INJECTION, SOLUTION INTRAVENOUS at 20:41

## 2025-02-25 RX ADMIN — Medication 21 PERCENT: at 07:33

## 2025-02-25 RX ADMIN — METRONIDAZOLE 500 MG: 500 INJECTION, SOLUTION INTRAVENOUS at 23:00

## 2025-02-25 RX ADMIN — ASPIRIN 81 MG: 81 TABLET, COATED ORAL at 08:54

## 2025-02-25 ASSESSMENT — PAIN DESCRIPTION - LOCATION
LOCATION: HEAD
LOCATION: HEAD

## 2025-02-25 ASSESSMENT — PAIN SCALES - GENERAL
PAINLEVEL_OUTOF10: 0 - NO PAIN
PAINLEVEL_OUTOF10: 3
PAINLEVEL_OUTOF10: 3

## 2025-02-25 ASSESSMENT — COGNITIVE AND FUNCTIONAL STATUS - GENERAL
DAILY ACTIVITIY SCORE: 24
MOBILITY SCORE: 24

## 2025-02-25 ASSESSMENT — ACTIVITIES OF DAILY LIVING (ADL): LACK_OF_TRANSPORTATION: NO

## 2025-02-25 NOTE — PROGRESS NOTES
02/25/25 1635   Discharge Planning   Living Arrangements Children  (Son)   Support Systems Children   Assistance Needed None   Type of Residence Private residence   Number of Stairs to Enter Residence 3   Number of Stairs Within Residence 0   Do you have animals or pets at home? No   Who is requesting discharge planning? Provider   Home or Post Acute Services None   Expected Discharge Disposition Home   Does the patient need discharge transport arranged? No   Financial Resource Strain   How hard is it for you to pay for the very basics like food, housing, medical care, and heating? Not hard   Housing Stability   In the last 12 months, was there a time when you were not able to pay the mortgage or rent on time? N   In the past 12 months, how many times have you moved where you were living? 0   At any time in the past 12 months, were you homeless or living in a shelter (including now)? N   Transportation Needs   In the past 12 months, has lack of transportation kept you from medical appointments or from getting medications? no   In the past 12 months, has lack of transportation kept you from meetings, work, or from getting things needed for daily living? No   Stroke Family Assessment   Stroke Family Assessment Needed No   Intensity of Service   Intensity of Service 0-30 min     Care Transitions: Patient reviewed in care round meeting this AM, ADOD 24 hours. Met with patient at bedside. Role of TCC explained. Demographics and contacts verified. PCP Alonzo Tierney. Preferred pharmacy [facility] in [place]. Denies any difficulty obtaining/affording medications. He is independent at home with all ADL's and drives self. Denies the need for any DME equipment. [facility] 24/24. Discharge plan is to return home, denies any needs or in home resources at this time. Care team to follow. Liz Buck RN/TCC

## 2025-02-25 NOTE — H&P
"History Of Present Illness  Jesus Rand \"Josh" is a 64 y.o. male    presented today with generalized abdominal pain, and n/v/d that started around 7:00 this morning. He has had similar symptoms in the past that he has been evaluated for and no etiology has been identified. Recently had an EGD. Patient was quite uncomfortable upon arrival.    Who presented to the emergency room for generalized abdominal pain.  On presentation, blood pressure 188/63, heart rate 85, respiratory rate 18, afebrile, saturation oxygen 100% on room air.  Pertinent findings on blood workup; WBC 16,600, bicarbonate 19, lactic acid 4.8.  CT scan of the abdomen pelvis showed wall thickening of the colon compatible with an acute colitis.  Patient was given in the emergency room IV Flagyl and ciprofloxacin and then admitted to the medical service for further investigation and management.  Upon encounter now, patient reports feeling better.  He said that he was in his usual state of health when suddenly started experiencing diffuse sharp throbbing abdominal pain associated with nausea.  He then had vomiting.  He did not have any fever or chills.  No urinary symptoms.  Did not have any diarrhea.  He said that he does have a history of diverticulitis in the past.  Last time he did colonoscopy was around 5 years ago.  And according to his words, it was normal at that time.    ROS  10 systems were reviewed and were negative except for those noted in the history of present illness.    Past Medical History  Past Medical History:   Diagnosis Date    GERD (gastroesophageal reflux disease)     Hypertension      Pertinent medical history also documented in my below narrative    Surgical History  Past Surgical History:   Procedure Laterality Date    OTHER SURGICAL HISTORY  09/20/2019    Appendectomy    SHOULDER SURGERY Left     TOTAL HIP ARTHROPLASTY Left       Pertinent surgical history also documented in my below narrative    Social History  He reports " "that he has never smoked. He has never used smokeless tobacco. He reports that he does not currently use alcohol. He reports that he does not use drugs.    Family History  Family History   Problem Relation Name Age of Onset    No Known Problems Mother      No Known Problems Father      No Known Problems Sister      No Known Problems Brother          Medications  Current Outpatient Medications   Medication Instructions    amLODIPine (NORVASC) 10 mg, oral, Daily    aspirin 81 mg, oral, Daily    carvedilol (COREG) 12.5 mg, oral, 2 times daily (morning and late afternoon)    chlorthalidone (HYGROTON) 25 mg, oral, Daily    ciprofloxacin (CIPRO) 500 mg, oral, 2 times daily    cyclobenzaprine (FLEXERIL) 10 mg, oral, 3 times daily PRN    dicyclomine (BENTYL) 20 mg, oral, 4 times daily before meals and nightly    gabapentin (NEURONTIN) 300 mg, oral, 3 times daily    ibuprofen-diphenhydramine HCl (Ibuprofen PM) 200-25 mg capsule per capsule 2 capsules, Nightly    ibuprofen 400 mg, Every 6 hours PRN    metroNIDAZOLE (FLAGYL) 500 mg, oral, 3 times daily    ondansetron ODT (ZOFRAN-ODT) 4 mg, oral, Every 8 hours PRN    pantoprazole (PROTONIX) 40 mg, oral, 2 times daily, Do not crush, chew, or split.       Allergies  Penicillins    Last Recorded Vitals  Blood pressure 149/80, pulse 71, temperature 37.1 °C (98.7 °F), temperature source Temporal, resp. rate 20, height 1.803 m (5' 10.98\"), weight 98.6 kg (217 lb 6 oz), SpO2 93%.    Physical Exam  Constitutional:       General: He is not in acute distress.     Appearance: He is ill-appearing.      Comments: Awake alert and oriented x3   HENT:      Mouth/Throat:      Pharynx: Oropharynx is clear.   Eyes:      Pupils: Pupils are equal, round, and reactive to light.   Cardiovascular:      Rate and Rhythm: Normal rate and regular rhythm.      Heart sounds: Normal heart sounds.   Pulmonary:      Effort: No respiratory distress.      Breath sounds: Normal breath sounds. No wheezing or " rhonchi.   Abdominal:      General: Abdomen is flat. Bowel sounds are normal. There is no distension.      Palpations: Abdomen is soft.      Tenderness: There is abdominal tenderness.   Musculoskeletal:         General: No swelling.   Skin:     General: Skin is warm.   Neurological:      General: No focal deficit present.   Psychiatric:         Mood and Affect: Mood normal.         Behavior: Behavior normal.         Thought Content: Thought content normal.         Judgment: Judgment normal.           Relevant Results  Results for orders placed or performed during the hospital encounter of 02/24/25 (from the past 24 hours)   CBC and Auto Differential   Result Value Ref Range    WBC 16.6 (H) 4.4 - 11.3 x10*3/uL    nRBC 0.0 0.0 - 0.0 /100 WBCs    RBC 4.70 4.50 - 5.90 x10*6/uL    Hemoglobin 14.4 13.5 - 17.5 g/dL    Hematocrit 41.5 41.0 - 52.0 %    MCV 88 80 - 100 fL    MCH 30.6 26.0 - 34.0 pg    MCHC 34.7 32.0 - 36.0 g/dL    RDW 12.1 11.5 - 14.5 %    Platelets 332 150 - 450 x10*3/uL    Neutrophils % 91.2 40.0 - 80.0 %    Immature Granulocytes %, Automated 0.5 0.0 - 0.9 %    Lymphocytes % 4.1 13.0 - 44.0 %    Monocytes % 3.6 2.0 - 10.0 %    Eosinophils % 0.4 0.0 - 6.0 %    Basophils % 0.2 0.0 - 2.0 %    Neutrophils Absolute 15.09 (H) 1.20 - 7.70 x10*3/uL    Immature Granulocytes Absolute, Automated 0.08 0.00 - 0.70 x10*3/uL    Lymphocytes Absolute 0.68 (L) 1.20 - 4.80 x10*3/uL    Monocytes Absolute 0.60 0.10 - 1.00 x10*3/uL    Eosinophils Absolute 0.06 0.00 - 0.70 x10*3/uL    Basophils Absolute 0.04 0.00 - 0.10 x10*3/uL   Basic metabolic panel   Result Value Ref Range    Glucose 167 (H) 74 - 99 mg/dL    Sodium 138 136 - 145 mmol/L    Potassium 3.7 3.5 - 5.3 mmol/L    Chloride 102 98 - 107 mmol/L    Bicarbonate 19 (L) 21 - 32 mmol/L    Anion Gap 21 (H) 10 - 20 mmol/L    Urea Nitrogen 22 6 - 23 mg/dL    Creatinine 1.03 0.50 - 1.30 mg/dL    eGFR 82 >60 mL/min/1.73m*2    Calcium 9.9 8.6 - 10.3 mg/dL   Lipase   Result Value  Ref Range    Lipase 5 (L) 9 - 82 U/L   Hepatic function panel   Result Value Ref Range    Albumin 4.8 3.4 - 5.0 g/dL    Bilirubin, Total 1.5 (H) 0.0 - 1.2 mg/dL    Bilirubin, Direct 0.1 0.0 - 0.3 mg/dL    Alkaline Phosphatase 64 33 - 136 U/L    ALT 14 10 - 52 U/L    AST 16 9 - 39 U/L    Total Protein 7.9 6.4 - 8.2 g/dL   Lactate   Result Value Ref Range    Lactate 4.8 (HH) 0.4 - 2.0 mmol/L   Urinalysis with Reflex Culture and Microscopic   Result Value Ref Range    Color, Urine Light-Yellow Light-Yellow, Yellow, Dark-Yellow    Appearance, Urine Clear Clear    Specific Gravity, Urine 1.023 1.005 - 1.035    pH, Urine 7.5 5.0, 5.5, 6.0, 6.5, 7.0, 7.5, 8.0    Protein, Urine 100 (2+) (A) NEGATIVE, 10 (TRACE), 20 (TRACE) mg/dL    Glucose, Urine 50 (TRACE) (A) Normal mg/dL    Blood, Urine NEGATIVE NEGATIVE mg/dL    Ketones, Urine 150 (4+) (A) NEGATIVE mg/dL    Bilirubin, Urine NEGATIVE NEGATIVE mg/dL    Urobilinogen, Urine Normal Normal mg/dL    Nitrite, Urine NEGATIVE NEGATIVE    Leukocyte Esterase, Urine NEGATIVE NEGATIVE   Urinalysis Microscopic   Result Value Ref Range    WBC, Urine NONE 1-5, NONE /HPF    RBC, Urine 3-5 NONE, 1-2, 3-5 /HPF    Mucus, Urine FEW Reference range not established. /LPF   Lactate   Result Value Ref Range    Lactate 1.8 0.4 - 2.0 mmol/L        CT abdomen pelvis w IV contrast    Result Date: 2/24/2025  STUDY: CT Abdomen and Pelvis with IV Contrast; 02/24/2025 at 5:31 PM INDICATION: Lower abdominal pain. COMPARISON: CT abdomen and pelvis 08/05/24, 07/03/24. ACCESSION NUMBER(S): [id number] ORDERING CLINICIAN: ELSA ROMERO TECHNIQUE: CT of the abdomen and pelvis was performed.  Contiguous axial images were obtained at 3 mm slice thickness through the abdomen and pelvis. Coronal and sagittal reconstructions at 3 mm slice thickness were performed.  Omnipaque-350 68 mL was administered intravenously.  FINDINGS: LOWER CHEST: No cardiomegaly.  No pericardial effusion.  Lung bases are clear.  Small hiatal hernia.  ABDOMEN:  LIVER: No hepatomegaly.  Smooth surface contour.  Normal attenuation.  BILE DUCTS: No intrahepatic or extrahepatic biliary ductal dilatation.  GALLBLADDER: The gallbladder is present without gallstones. STOMACH: No abnormalities identified.  PANCREAS: No masses or ductal dilatation.  SPLEEN: No splenomegaly or focal splenic lesion.  ADRENAL GLANDS: No thickening or nodules.  KIDNEYS AND URETERS: Kidneys are normal in size and location.  No renal or ureteral calculi. Small right renal cyst.  PELVIS:  BLADDER: Mild wall thickening  REPRODUCTIVE ORGANS: No abnormalities identified.  BOWEL: There are scattered areas of wall thickening of the colon compatible with areas of colitis. Appendix is not identified. No inflammation in the right lower quadrant.  VESSELS: No abnormalities identified.  Abdominal aorta is normal in caliber.  PERITONEUM/RETROPERITONEUM/LYMPH NODES: No free fluid.  No pneumoperitoneum. No lymphadenopathy.  ABDOMINAL WALL: No abnormalities identified. SOFT TISSUES: No abnormalities identified.  BONES: No acute fracture or aggressive osseous lesion. Left hip arthroplasty again noted.    Scattered areas of wall thickening of the colon compatible with areas of colitis. Mild wall thickening of the urinary bladder. Correlate clinically for cystitis. Small hiatal hernia. Signed by Raciel Stockton MD         Assessment & Plan  Colitis  64-year-old male with a past medical history of diverticulitis, small bowel obstruction, cervical radiculopathy, hypertension who presented to the emergency room for diffuse throbbing abdominal pain associated with nausea and vomiting.  Patient was found to have an acute colon diverticulitis associated with elevated blood pressure, leukocytosis, lactic acidosis, and metabolic acidosis.    Admit patient to the inpatient medical service with vital signs monitoring.  I will place the patient on a clear liquid diet and give IV fluids normal saline at  rate of 75 cc/hour.  Continue treating with IV ceftriaxone and IV Flagyl.  Repeat CBC and BMP in the morning.  Antiemetics and pain control as needed  SCDs for DVT prophylaxis  Pepcid for GI prophylaxis  Patient is full code      (This note was generated with voice recognition software and may contain errors including spelling, grammar, syntax and misrecognition of what was dictated, that are not fully corrected)     Masoud Juarez MD

## 2025-02-25 NOTE — CARE PLAN
The patient's goals for the shift include      The clinical goals for the shift include decrease in abd pain and nausea

## 2025-02-25 NOTE — ASSESSMENT & PLAN NOTE
64-year-old male with a past medical history of diverticulitis, small bowel obstruction, cervical radiculopathy, hypertension who presented to the emergency room for diffuse throbbing abdominal pain associated with nausea and vomiting.  Patient was found to have an acute colon diverticulitis associated with elevated blood pressure, leukocytosis, lactic acidosis, and metabolic acidosis.    Admit patient to the inpatient medical service with vital signs monitoring.  I will place the patient on a clear liquid diet and give IV fluids normal saline at rate of 75 cc/hour.  Continue treating with IV ceftriaxone and IV Flagyl.  Repeat CBC and BMP in the morning.  Antiemetics and pain control as needed  SCDs for DVT prophylaxis  Pepcid for GI prophylaxis  Patient is full code

## 2025-02-25 NOTE — DISCHARGE INSTRUCTIONS
"Your CAT scan shows findings consistent with colitis which is inflammation of your colon.  It is unclear as to whether this is infectious or inflammatory.  Given that you have an elevated white blood cell count and initially had an elevated lactic acid that improved this could be infectious and you will be placed on oral antibiotics.  We did discuss being admitted to the hospital which I felt was the most reasonable, at this time you would like to go home.  Given that your lab work has improved and do feel that you can attempt discharge.  If your symptoms return or you develop any new or worsening symptoms return immediately.  I do recommend following up with gastroenterology.    Irritable bowel syndrome    The Basics  Written by the doctors and editors at [facility]  What is irritable bowel syndrome?  Irritable bowel syndrome, or \"IBS,\" is a chronic condition that causes belly pain and problems with bowel movements. \"Chronic\" means that it is long term and needs to be managed throughout a person's life.  Some people with IBS have frequent, watery bowel movements (diarrhea). Others do not have enough bowel movements (constipation). Some people go back and forth between diarrhea and constipation.  What are the symptoms of IBS?  Symptoms include:  ? Stomach pain and cramps, related to a bowel movement  ? Diarrhea, constipation, or both  ? Bloating  ? Gas  ? Mucus in bowel movements  Is there a test for IBS?  No. But your doctor or nurse can figure out if you have IBS by asking you a few questions, and by running tests to make sure that you do not have something else instead.  Lots of medical conditions can cause the same symptoms as IBS. So it is important that your doctor or nurse checks the other possibilities.  What can I do to feel better?  You can:  ? Keep track of what you ate each day, what you did, and how you felt. That way, you can figure out if anything you do or eat makes your symptoms better or worse.  ? " "Avoid foods that might make your IBS worse. Start by avoiding foods that give you gas. Then, you can try avoiding milk, ice cream, and other foods that have traces of milk for 2 weeks. Ask your doctor or nurse for advice on which foods can make IBS worse. They might suggest changing your diet. Some people need to:  ? Eat a \"low-FODMAP\" diet - FODMAP stands for \"fermentable oligosaccharides, disaccharides, monosaccharides, and polyols.\" These are types of carbohydrates that are harder for the body to digest than other types. A low-FODMAP diet means limiting foods that contain these carbohydrates.  ? Avoid lactose - This is the main form of sugar found in milk.  ? Eat more fiber, if you have constipation. You can do this by eating more fruits and vegetables. Or you can take fiber pills or powders. (If eating more fiber makes your symptoms worse, cut back on the fiber.)  ? Get regular physical activity. Try to do something active for 20 to 60 minutes, at least 3 to 5 days a week. Studies show this helps improve IBS symptoms. Even gentle forms of movement, like walking, can help.  How is IBS treated?  IBS cannot be cured. But there are medicines that can help relieve symptoms, including:  ? Medicines to ease diarrhea  ? Medicines to ease constipation  ? Antidepressants - These work by blocking pain. When used to treat IBS, they are given at a much lower dose than would normally be given to treat depression.  ? \"Antispasmodics\" - These might help with cramping.  ? Antibiotics - These sometimes help with bloating and diarrhea.  Stress can make IBS symptoms worse. It might help to find ways to relax and manage stress. You can talk with a counselor, or talk with others going through similar things. Support groups can happen in person or online.  What will my life be like?  Most people with IBS have the condition for the rest of their life. Even so, most people find ways to improve their symptoms. Keep working with your " doctor or nurse until you find an approach that works.  When should I call the doctor?  Call for advice if you have:  ? Stool with blood in it or stool that looks black  ? Diarrhea that wakes you up from sleep  ? Changes in your bowel movements that do not go away - This can include a change in the color, amount, and frequency of bowel movements.  ? Belly pain that is severe or gets worse  ? Lost weight without trying  ? Signs of infection - These include a fever of 100.4°F (38°C) or higher, or chills.  All topics are updated as new evidence becomes available and our peer review process is complete.  This topic retrieved from [employer] on: Feb 12, 2025.  Topic 07751 Version 12.0  Release: 32.10.4 - C33.42  © 2025 [employer] and/or its affiliates. All rights reserved.

## 2025-02-25 NOTE — CARE PLAN
The patient's goals for the shift include  no pain or vomiting    The clinical goals for the shift include decrease in abd pain and nausea no falls, labs and vs wdl, no nausea

## 2025-02-25 NOTE — ASSESSMENT & PLAN NOTE
64-year-old male with a past medical history of diverticulitis, small bowel obstruction, cervical radiculopathy, hypertension who presented to the emergency room for diffuse throbbing abdominal pain associated with nausea and vomiting.  Patient was found to have an acute colon diverticulitis associated with elevated blood pressure, leukocytosis, lactic acidosis, and metabolic acidosis.    We will continue IV antibiotics with ciprofloxacin and Flagyl for now.  Abdominal pain has improved.  He does continue to have some upset stomach with eating but he is technically keeping food down now.  Advance to soft diet.    Patient will need follow-up colonoscopy once he recovers from this.

## 2025-02-25 NOTE — PROGRESS NOTES
"Jesus Rand \"David\" is a 64 y.o. male on day 1 of admission presenting with Colitis.      Subjective   Patient seen and examined at bedside.  He has been managed in the hospital for colitis.  His abdominal pain has resolved.  Does have an upset stomach still.       Objective     Last Recorded Vitals  /72 (BP Location: Left arm, Patient Position: Lying)   Pulse 68   Temp 37.7 °C (99.8 °F) (Temporal)   Resp 14   Wt 98.6 kg (217 lb 6 oz)   SpO2 96%   Intake/Output last 3 Shifts:    Intake/Output Summary (Last 24 hours) at 2/25/2025 1833  Last data filed at 2/25/2025 1000  Gross per 24 hour   Intake 120 ml   Output 725 ml   Net -605 ml       Admission Weight  Weight: 99.8 kg (220 lb) (02/24/25 1550)    Daily Weight  02/24/25 : 98.6 kg (217 lb 6 oz)    Image Results  CT abdomen pelvis w IV contrast  Narrative: STUDY:  CT Abdomen and Pelvis with IV Contrast; 02/24/2025 at 5:31 PM  INDICATION:  Lower abdominal pain.  COMPARISON:  CT abdomen and pelvis 08/05/24, 07/03/24.  ACCESSION NUMBER(S):  [id number]  ORDERING CLINICIAN:  ELSA ROMERO  TECHNIQUE:  CT of the abdomen and pelvis was performed.  Contiguous axial images  were obtained at 3 mm slice thickness through the abdomen and pelvis.   Coronal and sagittal reconstructions at 3 mm slice thickness were  performed.  Omnipaque-350 68 mL was administered intravenously.    FINDINGS:  LOWER CHEST:  No cardiomegaly.  No pericardial effusion.  Lung bases are clear.  Small hiatal hernia.     ABDOMEN:     LIVER:  No hepatomegaly.  Smooth surface contour.  Normal attenuation.     BILE DUCTS:  No intrahepatic or extrahepatic biliary ductal dilatation.     GALLBLADDER:  The gallbladder is present without gallstones.  STOMACH:  No abnormalities identified.     PANCREAS:  No masses or ductal dilatation.     SPLEEN:  No splenomegaly or focal splenic lesion.     ADRENAL GLANDS:  No thickening or nodules.     KIDNEYS AND URETERS:  Kidneys are normal in size and " location.  No renal or ureteral  calculi. Small right renal cyst.     PELVIS:     BLADDER:  Mild wall thickening     REPRODUCTIVE ORGANS:  No abnormalities identified.     BOWEL:  There are scattered areas of wall thickening of the colon compatible  with areas of colitis. Appendix is not identified. No inflammation in  the right lower quadrant.     VESSELS:  No abnormalities identified.  Abdominal aorta is normal in caliber.      PERITONEUM/RETROPERITONEUM/LYMPH NODES:  No free fluid.  No pneumoperitoneum.  No lymphadenopathy.     ABDOMINAL WALL:  No abnormalities identified.  SOFT TISSUES:   No abnormalities identified.     BONES:  No acute fracture or aggressive osseous lesion. Left hip arthroplasty  again noted.  Impression: Scattered areas of wall thickening of the colon compatible with areas  of colitis.  Mild wall thickening of the urinary bladder. Correlate clinically for  cystitis.  Small hiatal hernia.  Signed by Raciel Stockton MD        Physical exam:  General:     Well appearing  HENT:      Head: Normocephalic and atraumatic.      Mouth: Mucous membranes are moist.   Eyes:      Pupils are equal, round, and reactive to light.   Cardiovascular:      Normal rate and regular rhythm. Normal S1, S2.  No murmurs, clicks, gallops.   Pulmonary:      Clear to auscultation bilaterally.  No wheezing, rhonchi, or crackles heard.   Abdominal:       Bowel sounds are normal.      Abdomen is soft, nontender, nondistended  Skin:      No lesions seen  Musculoskeletal:         Extremities: No edema present. No deformities with no abnormal range of motion     Neck supple.   Neurological:      Mental Status: Patient is alert and oriented X3     CN II-XII intact.  No focal neurologic deficits appreciated.     Relevant Results               Assessment/Plan                  Assessment & Plan  Colitis  64-year-old male with a past medical history of diverticulitis, small bowel obstruction, cervical radiculopathy, hypertension who  presented to the emergency room for diffuse throbbing abdominal pain associated with nausea and vomiting.  Patient was found to have an acute colon diverticulitis associated with elevated blood pressure, leukocytosis, lactic acidosis, and metabolic acidosis.    We will continue IV antibiotics with ciprofloxacin and Flagyl for now.  Abdominal pain has improved.  He does continue to have some upset stomach with eating but he is technically keeping food down now.  Advance to soft diet.    Patient will need follow-up colonoscopy once he recovers from this.  2.  Cervical radiculopathy  3.  Hypertension: Continue antihypertensives  4.  History of small bowel obstruction  5.  Obese: BMI 30.33  6.  Leukocytosis: Secondary to colitis.  Continue IV antibiotics.  Follow-up CBC in the morning.  Monitor fever curve.              DVT ppx: SCDs  Diet: Soft regular diet    Disposition: Continue IV antibiotics.  Continue slowly advancing diet.  If patient continues to tolerate a diet, we will consider discharge home tomorrow.  Follow-up CBC and BMP in the morning.    Nas Bronson,

## 2025-02-26 VITALS
BODY MASS INDEX: 30.43 KG/M2 | WEIGHT: 217.37 LBS | HEIGHT: 71 IN | DIASTOLIC BLOOD PRESSURE: 64 MMHG | RESPIRATION RATE: 18 BRPM | SYSTOLIC BLOOD PRESSURE: 105 MMHG | HEART RATE: 67 BPM | OXYGEN SATURATION: 93 % | TEMPERATURE: 98.7 F

## 2025-02-26 PROBLEM — K52.9 COLITIS: Status: RESOLVED | Noted: 2025-02-24 | Resolved: 2025-02-26

## 2025-02-26 LAB
ANION GAP SERPL CALC-SCNC: 14 MMOL/L (ref 10–20)
BASOPHILS # BLD AUTO: 0.02 X10*3/UL (ref 0–0.1)
BASOPHILS NFR BLD AUTO: 0.2 %
BUN SERPL-MCNC: 17 MG/DL (ref 6–23)
CALCIUM SERPL-MCNC: 8.9 MG/DL (ref 8.6–10.3)
CHLORIDE SERPL-SCNC: 97 MMOL/L (ref 98–107)
CO2 SERPL-SCNC: 23 MMOL/L (ref 21–32)
CREAT SERPL-MCNC: 0.94 MG/DL (ref 0.5–1.3)
EGFRCR SERPLBLD CKD-EPI 2021: >90 ML/MIN/1.73M*2
EOSINOPHIL # BLD AUTO: 0.02 X10*3/UL (ref 0–0.7)
EOSINOPHIL NFR BLD AUTO: 0.2 %
ERYTHROCYTE [DISTWIDTH] IN BLOOD BY AUTOMATED COUNT: 12 % (ref 11.5–14.5)
GLUCOSE SERPL-MCNC: 141 MG/DL (ref 74–99)
HCT VFR BLD AUTO: 41 % (ref 41–52)
HGB BLD-MCNC: 14.5 G/DL (ref 13.5–17.5)
IMM GRANULOCYTES # BLD AUTO: 0.07 X10*3/UL (ref 0–0.7)
IMM GRANULOCYTES NFR BLD AUTO: 0.5 % (ref 0–0.9)
LYMPHOCYTES # BLD AUTO: 0.56 X10*3/UL (ref 1.2–4.8)
LYMPHOCYTES NFR BLD AUTO: 4.3 %
MCH RBC QN AUTO: 31.5 PG (ref 26–34)
MCHC RBC AUTO-ENTMCNC: 35.4 G/DL (ref 32–36)
MCV RBC AUTO: 89 FL (ref 80–100)
MONOCYTES # BLD AUTO: 0.33 X10*3/UL (ref 0.1–1)
MONOCYTES NFR BLD AUTO: 2.5 %
NEUTROPHILS # BLD AUTO: 12.12 X10*3/UL (ref 1.2–7.7)
NEUTROPHILS NFR BLD AUTO: 92.3 %
NRBC BLD-RTO: 0 /100 WBCS (ref 0–0)
PLATELET # BLD AUTO: 281 X10*3/UL (ref 150–450)
POTASSIUM SERPL-SCNC: 3.4 MMOL/L (ref 3.5–5.3)
RBC # BLD AUTO: 4.6 X10*6/UL (ref 4.5–5.9)
SODIUM SERPL-SCNC: 131 MMOL/L (ref 136–145)
WBC # BLD AUTO: 13.1 X10*3/UL (ref 4.4–11.3)

## 2025-02-26 PROCEDURE — 85025 COMPLETE CBC W/AUTO DIFF WBC: CPT | Performed by: STUDENT IN AN ORGANIZED HEALTH CARE EDUCATION/TRAINING PROGRAM

## 2025-02-26 PROCEDURE — 99239 HOSP IP/OBS DSCHRG MGMT >30: CPT | Performed by: STUDENT IN AN ORGANIZED HEALTH CARE EDUCATION/TRAINING PROGRAM

## 2025-02-26 PROCEDURE — 2500000002 HC RX 250 W HCPCS SELF ADMINISTERED DRUGS (ALT 637 FOR MEDICARE OP, ALT 636 FOR OP/ED): Performed by: STUDENT IN AN ORGANIZED HEALTH CARE EDUCATION/TRAINING PROGRAM

## 2025-02-26 PROCEDURE — 2500000004 HC RX 250 GENERAL PHARMACY W/ HCPCS (ALT 636 FOR OP/ED): Performed by: STUDENT IN AN ORGANIZED HEALTH CARE EDUCATION/TRAINING PROGRAM

## 2025-02-26 PROCEDURE — 36415 COLL VENOUS BLD VENIPUNCTURE: CPT | Performed by: STUDENT IN AN ORGANIZED HEALTH CARE EDUCATION/TRAINING PROGRAM

## 2025-02-26 PROCEDURE — 80048 BASIC METABOLIC PNL TOTAL CA: CPT | Performed by: STUDENT IN AN ORGANIZED HEALTH CARE EDUCATION/TRAINING PROGRAM

## 2025-02-26 PROCEDURE — 2500000004 HC RX 250 GENERAL PHARMACY W/ HCPCS (ALT 636 FOR OP/ED): Performed by: INTERNAL MEDICINE

## 2025-02-26 PROCEDURE — 2500000001 HC RX 250 WO HCPCS SELF ADMINISTERED DRUGS (ALT 637 FOR MEDICARE OP): Performed by: INTERNAL MEDICINE

## 2025-02-26 PROCEDURE — 2500000001 HC RX 250 WO HCPCS SELF ADMINISTERED DRUGS (ALT 637 FOR MEDICARE OP): Performed by: STUDENT IN AN ORGANIZED HEALTH CARE EDUCATION/TRAINING PROGRAM

## 2025-02-26 PROCEDURE — 2500000001 HC RX 250 WO HCPCS SELF ADMINISTERED DRUGS (ALT 637 FOR MEDICARE OP)

## 2025-02-26 RX ORDER — METRONIDAZOLE 500 MG/1
500 TABLET ORAL 3 TIMES DAILY
Qty: 9 TABLET | Refills: 0 | Status: SHIPPED | OUTPATIENT
Start: 2025-02-26 | End: 2025-03-02

## 2025-02-26 RX ORDER — POTASSIUM CHLORIDE 20 MEQ/1
40 TABLET, EXTENDED RELEASE ORAL ONCE
Status: COMPLETED | OUTPATIENT
Start: 2025-02-26 | End: 2025-02-26

## 2025-02-26 RX ORDER — CIPROFLOXACIN 500 MG/1
500 TABLET ORAL 2 TIMES DAILY
Qty: 6 TABLET | Refills: 0 | Status: SHIPPED | OUTPATIENT
Start: 2025-02-26 | End: 2025-03-02

## 2025-02-26 RX ORDER — TRAZODONE HYDROCHLORIDE 50 MG/1
50 TABLET ORAL NIGHTLY PRN
Status: DISCONTINUED | OUTPATIENT
Start: 2025-02-26 | End: 2025-02-26 | Stop reason: HOSPADM

## 2025-02-26 RX ORDER — GUAIFENESIN 600 MG/1
600 TABLET, EXTENDED RELEASE ORAL 2 TIMES DAILY
Status: DISCONTINUED | OUTPATIENT
Start: 2025-02-26 | End: 2025-02-26 | Stop reason: HOSPADM

## 2025-02-26 RX ORDER — FLUTICASONE PROPIONATE 50 MCG
2 SPRAY, SUSPENSION (ML) NASAL DAILY
Status: DISCONTINUED | OUTPATIENT
Start: 2025-02-26 | End: 2025-02-26 | Stop reason: HOSPADM

## 2025-02-26 RX ADMIN — ACETAMINOPHEN 650 MG: 325 TABLET ORAL at 15:05

## 2025-02-26 RX ADMIN — CIPROFLOXACIN 400 MG: 2 INJECTION, SOLUTION INTRAVENOUS at 09:02

## 2025-02-26 RX ADMIN — GUAIFENESIN 600 MG: 600 TABLET, EXTENDED RELEASE ORAL at 11:04

## 2025-02-26 RX ADMIN — METRONIDAZOLE 500 MG: 500 INJECTION, SOLUTION INTRAVENOUS at 06:18

## 2025-02-26 RX ADMIN — FLUTICASONE PROPIONATE 2 SPRAY: 50 SPRAY, METERED NASAL at 11:04

## 2025-02-26 RX ADMIN — FAMOTIDINE 20 MG: 10 INJECTION, SOLUTION INTRAVENOUS at 09:00

## 2025-02-26 RX ADMIN — ASPIRIN 81 MG: 81 TABLET, COATED ORAL at 09:01

## 2025-02-26 RX ADMIN — POTASSIUM CHLORIDE 40 MEQ: 1500 TABLET, EXTENDED RELEASE ORAL at 09:01

## 2025-02-26 RX ADMIN — CARVEDILOL 12.5 MG: 12.5 TABLET, FILM COATED ORAL at 09:01

## 2025-02-26 RX ADMIN — MORPHINE SULFATE 2 MG: 2 INJECTION, SOLUTION INTRAMUSCULAR; INTRAVENOUS at 06:18

## 2025-02-26 RX ADMIN — HYDROCHLOROTHIAZIDE 25 MG: 25 TABLET ORAL at 09:01

## 2025-02-26 RX ADMIN — AMLODIPINE BESYLATE 10 MG: 10 TABLET ORAL at 09:01

## 2025-02-26 RX ADMIN — METRONIDAZOLE 500 MG: 500 INJECTION, SOLUTION INTRAVENOUS at 13:51

## 2025-02-26 RX ADMIN — TRAZODONE HYDROCHLORIDE 50 MG: 50 TABLET ORAL at 02:17

## 2025-02-26 SDOH — SOCIAL STABILITY: SOCIAL INSECURITY: HAS ANYONE EVER THREATENED TO HURT YOUR FAMILY OR YOUR PETS?: NO

## 2025-02-26 SDOH — SOCIAL STABILITY: SOCIAL INSECURITY: WITHIN THE LAST YEAR, HAVE YOU BEEN AFRAID OF YOUR PARTNER OR EX-PARTNER?: NO

## 2025-02-26 SDOH — SOCIAL STABILITY: SOCIAL INSECURITY
WITHIN THE LAST YEAR, HAVE YOU BEEN KICKED, HIT, SLAPPED, OR OTHERWISE PHYSICALLY HURT BY YOUR PARTNER OR EX-PARTNER?: NO

## 2025-02-26 SDOH — SOCIAL STABILITY: SOCIAL INSECURITY
WITHIN THE LAST YEAR, HAVE YOU BEEN RAPED OR FORCED TO HAVE ANY KIND OF SEXUAL ACTIVITY BY YOUR PARTNER OR EX-PARTNER?: NO

## 2025-02-26 SDOH — SOCIAL STABILITY: SOCIAL INSECURITY: ARE YOU OR HAVE YOU BEEN THREATENED OR ABUSED PHYSICALLY, EMOTIONALLY, OR SEXUALLY BY ANYONE?: NO

## 2025-02-26 SDOH — ECONOMIC STABILITY: FOOD INSECURITY: WITHIN THE PAST 12 MONTHS, THE FOOD YOU BOUGHT JUST DIDN'T LAST AND YOU DIDN'T HAVE MONEY TO GET MORE.: NEVER TRUE

## 2025-02-26 SDOH — SOCIAL STABILITY: SOCIAL INSECURITY: WERE YOU ABLE TO COMPLETE ALL THE BEHAVIORAL HEALTH SCREENINGS?: YES

## 2025-02-26 SDOH — HEALTH STABILITY: MENTAL HEALTH: HOW OFTEN DO YOU HAVE A DRINK CONTAINING ALCOHOL?: NEVER

## 2025-02-26 SDOH — SOCIAL STABILITY: SOCIAL INSECURITY: ABUSE: ADULT

## 2025-02-26 SDOH — ECONOMIC STABILITY: INCOME INSECURITY: IN THE PAST 12 MONTHS HAS THE ELECTRIC, GAS, OIL, OR WATER COMPANY THREATENED TO SHUT OFF SERVICES IN YOUR HOME?: NO

## 2025-02-26 SDOH — ECONOMIC STABILITY: FOOD INSECURITY: WITHIN THE PAST 12 MONTHS, YOU WORRIED THAT YOUR FOOD WOULD RUN OUT BEFORE YOU GOT THE MONEY TO BUY MORE.: NEVER TRUE

## 2025-02-26 SDOH — SOCIAL STABILITY: SOCIAL INSECURITY: WITHIN THE LAST YEAR, HAVE YOU BEEN HUMILIATED OR EMOTIONALLY ABUSED IN OTHER WAYS BY YOUR PARTNER OR EX-PARTNER?: NO

## 2025-02-26 SDOH — SOCIAL STABILITY: SOCIAL INSECURITY: DO YOU FEEL ANYONE HAS EXPLOITED OR TAKEN ADVANTAGE OF YOU FINANCIALLY OR OF YOUR PERSONAL PROPERTY?: NO

## 2025-02-26 SDOH — SOCIAL STABILITY: SOCIAL INSECURITY: HAVE YOU HAD ANY THOUGHTS OF HARMING ANYONE ELSE?: NO

## 2025-02-26 SDOH — HEALTH STABILITY: MENTAL HEALTH: HOW OFTEN DO YOU HAVE SIX OR MORE DRINKS ON ONE OCCASION?: NEVER

## 2025-02-26 SDOH — SOCIAL STABILITY: SOCIAL INSECURITY: ARE THERE ANY APPARENT SIGNS OF INJURIES/BEHAVIORS THAT COULD BE RELATED TO ABUSE/NEGLECT?: NO

## 2025-02-26 SDOH — SOCIAL STABILITY: SOCIAL INSECURITY: DOES ANYONE TRY TO KEEP YOU FROM HAVING/CONTACTING OTHER FRIENDS OR DOING THINGS OUTSIDE YOUR HOME?: NO

## 2025-02-26 SDOH — SOCIAL STABILITY: SOCIAL INSECURITY: DO YOU FEEL UNSAFE GOING BACK TO THE PLACE WHERE YOU ARE LIVING?: NO

## 2025-02-26 SDOH — SOCIAL STABILITY: SOCIAL INSECURITY: HAVE YOU HAD THOUGHTS OF HARMING ANYONE ELSE?: NO

## 2025-02-26 SDOH — HEALTH STABILITY: MENTAL HEALTH: HOW MANY DRINKS CONTAINING ALCOHOL DO YOU HAVE ON A TYPICAL DAY WHEN YOU ARE DRINKING?: PATIENT DOES NOT DRINK

## 2025-02-26 ASSESSMENT — COGNITIVE AND FUNCTIONAL STATUS - GENERAL
MOBILITY SCORE: 24
DAILY ACTIVITIY SCORE: 24

## 2025-02-26 ASSESSMENT — LIFESTYLE VARIABLES
AUDIT-C TOTAL SCORE: 0
AUDIT-C TOTAL SCORE: 0
HOW MANY STANDARD DRINKS CONTAINING ALCOHOL DO YOU HAVE ON A TYPICAL DAY: PATIENT DOES NOT DRINK
AUDIT-C TOTAL SCORE: 0
HOW OFTEN DO YOU HAVE A DRINK CONTAINING ALCOHOL: NEVER
SKIP TO QUESTIONS 9-10: 1
SKIP TO QUESTIONS 9-10: 1
HOW OFTEN DO YOU HAVE 6 OR MORE DRINKS ON ONE OCCASION: NEVER

## 2025-02-26 ASSESSMENT — PAIN SCALES - GENERAL
PAINLEVEL_OUTOF10: 0 - NO PAIN
PAINLEVEL_OUTOF10: 3
PAINLEVEL_OUTOF10: 3
PAINLEVEL_OUTOF10: 7
PAINLEVEL_OUTOF10: 0 - NO PAIN

## 2025-02-26 ASSESSMENT — PAIN - FUNCTIONAL ASSESSMENT
PAIN_FUNCTIONAL_ASSESSMENT: 0-10
PAIN_FUNCTIONAL_ASSESSMENT: 0-10

## 2025-02-26 ASSESSMENT — PATIENT HEALTH QUESTIONNAIRE - PHQ9
2. FEELING DOWN, DEPRESSED OR HOPELESS: NOT AT ALL
1. LITTLE INTEREST OR PLEASURE IN DOING THINGS: NOT AT ALL
SUM OF ALL RESPONSES TO PHQ9 QUESTIONS 1 & 2: 0

## 2025-02-26 ASSESSMENT — PAIN DESCRIPTION - LOCATION
LOCATION: ABDOMEN
LOCATION: ABDOMEN

## 2025-02-26 ASSESSMENT — PAIN DESCRIPTION - ORIENTATION: ORIENTATION: LOWER

## 2025-02-26 NOTE — NURSING NOTE
Discharge instructions discussed with pt. Voiced understanding. IV removed. Pt ambulated out himself.

## 2025-02-26 NOTE — PROGRESS NOTES
02/26/25 1400   Discharge Planning   Expected Discharge Disposition Home     Met with pt and he denies all needs at this time and his discharge plan remains home no needs. AMPAC 24/24 per nursing and ADOD is today per IDT meeting. Care Transitions to follow. Bee Davis BSN/RN-TCC

## 2025-02-26 NOTE — DISCHARGE SUMMARY
Discharge Diagnosis  Colitis    Issues Requiring Follow-Up  Colitis    Discharge Meds     Medication List      START taking these medications     ciprofloxacin 500 mg tablet; Commonly known as: Cipro; Take 1 tablet   (500 mg) by mouth 2 times a day for 3 days.   metroNIDAZOLE 500 mg tablet; Commonly known as: Flagyl; Take 1 tablet   (500 mg) by mouth 3 times a day for 3 days.     CONTINUE taking these medications     amLODIPine 10 mg tablet; Commonly known as: Norvasc; Take 1 tablet (10   mg) by mouth once daily.   aspirin 81 mg EC tablet; TAKE 1 TABLET BY MOUTH EVERY DAY   carvedilol 12.5 mg tablet; Commonly known as: Coreg; Take 1 tablet (12.5   mg) by mouth 2 times a day with meals.   chlorthalidone 25 mg tablet; Commonly known as: Hygroton; Take 1 tablet   (25 mg) by mouth once daily.   cyclobenzaprine 10 mg tablet; Commonly known as: Flexeril; Take 1 tablet   (10 mg) by mouth 3 times a day as needed for muscle spasms for up to 10   days.   dicyclomine 20 mg tablet; Commonly known as: Bentyl; Take 1 tablet (20   mg) by mouth 4 times a day before meals.   ibuprofen 200 mg tablet   Ibuprofen -25 mg capsule per capsule; Generic drug:   ibuprofen-diphenhydramine HCl   ondansetron ODT 4 mg disintegrating tablet; Commonly known as:   Zofran-ODT; Take 1 tablet (4 mg) by mouth every 8 hours if needed for   nausea or vomiting.   pantoprazole 40 mg EC tablet; Commonly known as: ProtoNix; Take 1 tablet   (40 mg) by mouth 2 times a day. Do not crush, chew, or split.     STOP taking these medications     gabapentin 100 mg capsule; Commonly known as: Neurontin       Test Results Pending At Discharge  Pending Labs       Order Current Status    Blood Culture Preliminary result    Blood Culture Preliminary result            Disposition:  Home    Hospital Course    64 y.o. male    presented today with generalized abdominal pain, and n/v/d that started around 7:00 this morning. He has had similar symptoms in the past that he  has been evaluated for and no etiology has been identified. Recently had an EGD. Patient was quite uncomfortable upon arrival.    Who presented to the emergency room for generalized abdominal pain.  On presentation, blood pressure 188/63, heart rate 85, respiratory rate 18, afebrile, saturation oxygen 100% on room air.  Pertinent findings on blood workup; WBC 16,600, bicarbonate 19, lactic acid 4.8.  CT scan of the abdomen pelvis showed wall thickening of the colon compatible with an acute colitis.  Patient was given in the emergency room IV Flagyl and ciprofloxacin and then admitted to the medical service for further investigation and management.  Upon encounter now, patient reports feeling better.  He said that he was in his usual state of health when suddenly started experiencing diffuse sharp throbbing abdominal pain associated with nausea.  He then had vomiting.  He did not have any fever or chills.  No urinary symptoms.  Did not have any diarrhea.  He said that he does have a history of diverticulitis in the past.  Last time he did colonoscopy was around 5 years ago.  And according to his words, it was normal at that time.    During hospital stay, patient still able to improve on ciprofloxacin and Flagyl.  He will tolerate a diet at discharge.  Pain would resolve.  He will continue oral ciprofloxacin and Flagyl for 3 additional days.  Referral to follow-up with GI was given at discharge.  He will need colonoscopy once his colitis has fully resolved.  Follow up with PCP in one week.  Vital signs were stable at discharge.  Patient agreed to discharge plan.    35 minutes total was spent on discharge planning including chart review, medication ordering, and discussion with family and hospital staff.       Pertinent Physical Exam At Time of Discharge  General:     Well appearing  HENT:      Head: Normocephalic and atraumatic.      Mouth: Mucous membranes are moist.   Eyes:      Pupils are equal, round, and reactive  to light.   Cardiovascular:      Normal rate and regular rhythm. Normal S1, S2.  No murmurs, clicks, gallops.   Pulmonary:      Clear to auscultation bilaterally.  No wheezing, rhonchi, or crackles heard.   Abdominal:       Bowel sounds are normal.      Abdomen is soft, nontender, nondistended  Skin:      No lesions seen  Musculoskeletal:         Extremities: No edema present. No deformities with no abnormal range of motion     Neck supple.   Neurological:      Mental Status: Patient is alert and oriented X3     CN II-XII intact.  No focal neurologic deficits appreciated.     Outpatient Follow-Up  Future Appointments   Date Time Provider Department Center   3/18/2025  1:20 PM MANI Ramirez-CNP [id number] [facility]   5/5/2025 11:00 AM CHASE LopezCNP [id number] [facility]   7/7/2025 10:40 AM Ravi Bellamy MD [id number] [facility]   2/26/2026  2:00 PM Alexis Stewart MD [id number] [facility]         Nas Bronson DO

## 2025-02-27 ENCOUNTER — HOSPITAL ENCOUNTER (EMERGENCY)
Facility: HOSPITAL | Age: 65
Discharge: HOME | End: 2025-02-27
Payer: COMMERCIAL

## 2025-02-27 ENCOUNTER — PATIENT OUTREACH (OUTPATIENT)
Dept: PRIMARY CARE | Facility: CLINIC | Age: 65
End: 2025-02-27
Payer: COMMERCIAL

## 2025-02-27 ENCOUNTER — PHARMACY VISIT (OUTPATIENT)
Dept: PHARMACY | Facility: CLINIC | Age: 65
End: 2025-02-27
Payer: COMMERCIAL

## 2025-02-27 VITALS
WEIGHT: 220 LBS | HEIGHT: 71 IN | OXYGEN SATURATION: 93 % | BODY MASS INDEX: 30.8 KG/M2 | SYSTOLIC BLOOD PRESSURE: 143 MMHG | RESPIRATION RATE: 20 BRPM | HEART RATE: 72 BPM | TEMPERATURE: 98.1 F | DIASTOLIC BLOOD PRESSURE: 79 MMHG

## 2025-02-27 DIAGNOSIS — K52.9 COLITIS: ICD-10-CM

## 2025-02-27 DIAGNOSIS — R11.2 NAUSEA AND VOMITING, UNSPECIFIED VOMITING TYPE: Primary | ICD-10-CM

## 2025-02-27 LAB
ALBUMIN SERPL BCP-MCNC: 5 G/DL (ref 3.4–5)
ALP SERPL-CCNC: 59 U/L (ref 33–136)
ALT SERPL W P-5'-P-CCNC: 28 U/L (ref 10–52)
ANION GAP SERPL CALC-SCNC: 17 MMOL/L (ref 10–20)
APPEARANCE UR: CLEAR
AST SERPL W P-5'-P-CCNC: 33 U/L (ref 9–39)
BASOPHILS # BLD AUTO: 0.05 X10*3/UL (ref 0–0.1)
BASOPHILS NFR BLD AUTO: 0.3 %
BILIRUB SERPL-MCNC: 1.5 MG/DL (ref 0–1.2)
BILIRUB UR STRIP.AUTO-MCNC: NEGATIVE MG/DL
BUN SERPL-MCNC: 24 MG/DL (ref 6–23)
CALCIUM SERPL-MCNC: 9.9 MG/DL (ref 8.6–10.3)
CHLORIDE SERPL-SCNC: 95 MMOL/L (ref 98–107)
CO2 SERPL-SCNC: 24 MMOL/L (ref 21–32)
COLOR UR: ABNORMAL
CREAT SERPL-MCNC: 1.1 MG/DL (ref 0.5–1.3)
EGFRCR SERPLBLD CKD-EPI 2021: 76 ML/MIN/1.73M*2
EOSINOPHIL # BLD AUTO: 0.22 X10*3/UL (ref 0–0.7)
EOSINOPHIL NFR BLD AUTO: 1.4 %
ERYTHROCYTE [DISTWIDTH] IN BLOOD BY AUTOMATED COUNT: 12 % (ref 11.5–14.5)
GLUCOSE SERPL-MCNC: 128 MG/DL (ref 74–99)
GLUCOSE UR STRIP.AUTO-MCNC: NORMAL MG/DL
HCT VFR BLD AUTO: 47.3 % (ref 41–52)
HGB BLD-MCNC: 16.8 G/DL (ref 13.5–17.5)
IMM GRANULOCYTES # BLD AUTO: 0.08 X10*3/UL (ref 0–0.7)
IMM GRANULOCYTES NFR BLD AUTO: 0.5 % (ref 0–0.9)
KETONES UR STRIP.AUTO-MCNC: ABNORMAL MG/DL
LACTATE SERPL-SCNC: 1.2 MMOL/L (ref 0.4–2)
LEUKOCYTE ESTERASE UR QL STRIP.AUTO: NEGATIVE
LIPASE SERPL-CCNC: 4 U/L (ref 9–82)
LYMPHOCYTES # BLD AUTO: 0.99 X10*3/UL (ref 1.2–4.8)
LYMPHOCYTES NFR BLD AUTO: 6.1 %
MCH RBC QN AUTO: 30.6 PG (ref 26–34)
MCHC RBC AUTO-ENTMCNC: 35.5 G/DL (ref 32–36)
MCV RBC AUTO: 86 FL (ref 80–100)
MONOCYTES # BLD AUTO: 0.87 X10*3/UL (ref 0.1–1)
MONOCYTES NFR BLD AUTO: 5.4 %
NEUTROPHILS # BLD AUTO: 13.97 X10*3/UL (ref 1.2–7.7)
NEUTROPHILS NFR BLD AUTO: 86.3 %
NITRITE UR QL STRIP.AUTO: NEGATIVE
NRBC BLD-RTO: 0 /100 WBCS (ref 0–0)
PH UR STRIP.AUTO: 7 [PH]
PLATELET # BLD AUTO: 211 X10*3/UL (ref 150–450)
POTASSIUM SERPL-SCNC: 3.6 MMOL/L (ref 3.5–5.3)
PROT SERPL-MCNC: 8.1 G/DL (ref 6.4–8.2)
PROT UR STRIP.AUTO-MCNC: ABNORMAL MG/DL
RBC # BLD AUTO: 5.49 X10*6/UL (ref 4.5–5.9)
RBC # UR STRIP.AUTO: NEGATIVE MG/DL
RBC #/AREA URNS AUTO: NORMAL /HPF
SODIUM SERPL-SCNC: 132 MMOL/L (ref 136–145)
SP GR UR STRIP.AUTO: 1.01
UROBILINOGEN UR STRIP.AUTO-MCNC: NORMAL MG/DL
WBC # BLD AUTO: 16.2 X10*3/UL (ref 4.4–11.3)
WBC #/AREA URNS AUTO: NORMAL /HPF

## 2025-02-27 PROCEDURE — 85025 COMPLETE CBC W/AUTO DIFF WBC: CPT | Performed by: NURSE PRACTITIONER

## 2025-02-27 PROCEDURE — 96376 TX/PRO/DX INJ SAME DRUG ADON: CPT

## 2025-02-27 PROCEDURE — 96361 HYDRATE IV INFUSION ADD-ON: CPT

## 2025-02-27 PROCEDURE — 2500000004 HC RX 250 GENERAL PHARMACY W/ HCPCS (ALT 636 FOR OP/ED): Performed by: NURSE PRACTITIONER

## 2025-02-27 PROCEDURE — 2500000001 HC RX 250 WO HCPCS SELF ADMINISTERED DRUGS (ALT 637 FOR MEDICARE OP): Performed by: NURSE PRACTITIONER

## 2025-02-27 PROCEDURE — RXMED WILLOW AMBULATORY MEDICATION CHARGE

## 2025-02-27 PROCEDURE — 96374 THER/PROPH/DIAG INJ IV PUSH: CPT

## 2025-02-27 PROCEDURE — 99284 EMERGENCY DEPT VISIT MOD MDM: CPT | Mod: 25

## 2025-02-27 PROCEDURE — 36415 COLL VENOUS BLD VENIPUNCTURE: CPT | Performed by: NURSE PRACTITIONER

## 2025-02-27 PROCEDURE — 80053 COMPREHEN METABOLIC PANEL: CPT | Performed by: NURSE PRACTITIONER

## 2025-02-27 PROCEDURE — 96375 TX/PRO/DX INJ NEW DRUG ADDON: CPT

## 2025-02-27 PROCEDURE — 83690 ASSAY OF LIPASE: CPT | Performed by: NURSE PRACTITIONER

## 2025-02-27 PROCEDURE — 83605 ASSAY OF LACTIC ACID: CPT | Performed by: NURSE PRACTITIONER

## 2025-02-27 PROCEDURE — 81001 URINALYSIS AUTO W/SCOPE: CPT | Performed by: NURSE PRACTITIONER

## 2025-02-27 PROCEDURE — 2500000002 HC RX 250 W HCPCS SELF ADMINISTERED DRUGS (ALT 637 FOR MEDICARE OP, ALT 636 FOR OP/ED): Performed by: NURSE PRACTITIONER

## 2025-02-27 RX ORDER — ONDANSETRON HYDROCHLORIDE 2 MG/ML
4 INJECTION, SOLUTION INTRAVENOUS ONCE
Status: COMPLETED | OUTPATIENT
Start: 2025-02-27 | End: 2025-02-27

## 2025-02-27 RX ORDER — PROMETHAZINE HYDROCHLORIDE 25 MG/1
25 TABLET ORAL ONCE
Status: COMPLETED | OUTPATIENT
Start: 2025-02-27 | End: 2025-02-27

## 2025-02-27 RX ORDER — KETOROLAC TROMETHAMINE 30 MG/ML
15 INJECTION, SOLUTION INTRAMUSCULAR; INTRAVENOUS ONCE
Status: COMPLETED | OUTPATIENT
Start: 2025-02-27 | End: 2025-02-27

## 2025-02-27 RX ORDER — PROMETHAZINE HYDROCHLORIDE 25 MG/1
25 TABLET ORAL EVERY 6 HOURS PRN
Qty: 10 TABLET | Refills: 0 | Status: SHIPPED | OUTPATIENT
Start: 2025-02-27 | End: 2025-03-06

## 2025-02-27 RX ORDER — CIPROFLOXACIN 500 MG/1
500 TABLET ORAL ONCE
Status: COMPLETED | OUTPATIENT
Start: 2025-02-27 | End: 2025-02-27

## 2025-02-27 RX ORDER — MORPHINE SULFATE 4 MG/ML
4 INJECTION, SOLUTION INTRAMUSCULAR; INTRAVENOUS ONCE
Status: COMPLETED | OUTPATIENT
Start: 2025-02-27 | End: 2025-02-27

## 2025-02-27 RX ORDER — METRONIDAZOLE 500 MG/1
500 TABLET ORAL ONCE
Status: COMPLETED | OUTPATIENT
Start: 2025-02-27 | End: 2025-02-27

## 2025-02-27 RX ADMIN — KETOROLAC TROMETHAMINE 15 MG: 30 INJECTION, SOLUTION INTRAMUSCULAR at 20:41

## 2025-02-27 RX ADMIN — MORPHINE SULFATE 4 MG: 4 INJECTION, SOLUTION INTRAMUSCULAR; INTRAVENOUS at 21:47

## 2025-02-27 RX ADMIN — ONDANSETRON 4 MG: 2 INJECTION INTRAMUSCULAR; INTRAVENOUS at 20:40

## 2025-02-27 RX ADMIN — METRONIDAZOLE 500 MG: 500 TABLET ORAL at 22:21

## 2025-02-27 RX ADMIN — SODIUM CHLORIDE 1000 ML: 0.9 INJECTION, SOLUTION INTRAVENOUS at 21:39

## 2025-02-27 RX ADMIN — CIPROFLOXACIN 500 MG: 500 TABLET ORAL at 22:21

## 2025-02-27 RX ADMIN — ONDANSETRON 4 MG: 2 INJECTION INTRAMUSCULAR; INTRAVENOUS at 21:34

## 2025-02-27 RX ADMIN — PROMETHAZINE HYDROCHLORIDE 25 MG: 25 TABLET ORAL at 22:21

## 2025-02-27 RX ADMIN — SODIUM CHLORIDE 1000 ML: 9 INJECTION, SOLUTION INTRAVENOUS at 20:40

## 2025-02-27 ASSESSMENT — PAIN SCALES - GENERAL
PAINLEVEL_OUTOF10: 7
PAINLEVEL_OUTOF10: 0 - NO PAIN
PAINLEVEL_OUTOF10: 3

## 2025-02-27 ASSESSMENT — PAIN - FUNCTIONAL ASSESSMENT: PAIN_FUNCTIONAL_ASSESSMENT: 0-10

## 2025-02-27 NOTE — PROGRESS NOTES
Discharge Facility: [facility]  Discharge Diagnosis: colitis  Admission Date: 2/24/2025  Discharge Date: 2/26/2025    PCP Appointment Date: none- 3/18/2025  Specialist Appointment Date:   -neurosurgery 5/5/2025, 7/7/2025  -ortho 2/26/2026    Hospital Encounter and Summary Linked: Yes  ED to Hosp-Admission (Discharged) with Nas Bronson DO (02/24/2025)   See discharge assessment below for further details    START taking these medications     ciprofloxacin 500 mg tablet; Commonly known as: Cipro; Take 1 tablet (500 mg) by mouth 2 times a day for 3 days.   metroNIDAZOLE 500 mg tablet; Commonly known as: Flagyl; Take 1 tablet (500 mg) by mouth 3 times a day for 3 days.    Wrap Up  Wrap Up Additional Comments: CTS spoke with patient. He was admitted to [facility] on 2/24/2025 with colitis. Discharged on 2/26/2025 with no home care needs. Patient stated that he was doing better. He still feels a little weak. Patient is back to work today. Reviewed medications. Patient has not been able to  the meds from the pharmacy. They were closed when he got discharged. Her stated that he will  after work today. Understands discharge instructions. Patient is scheduled for a follow up with PCP on 3/18/2025. Patient did not want to move up appt at this time. No questions or concerns voiced today (2/27/2025  9:16 AM)  Call End Time: 0921 (2/27/2025  9:16 AM)    Engagement  Call Start Time: 0916 (2/27/2025  9:16 AM)    Medications  Medications reviewed with patient/caregiver?: Yes (2/27/2025  9:16 AM)  Is the patient having any side effects they believe may be caused by any medication additions or changes?: No (2/27/2025  9:16 AM)  Does the patient have all medications ordered at discharge?: Yes (2/27/2025  9:16 AM)  Care Management Interventions: No intervention needed (2/27/2025  9:16 AM)  Prescription Comments: new: cipro and flagyl. stop gabapentin (2/27/2025  9:16 AM)  Is the patient taking all medications as directed  (includes completed medication regime)?: Yes (2/27/2025  9:16 AM)  Medication Comments: see med list (2/27/2025  9:16 AM)    Appointments  Does the patient have a primary care provider?: Yes (2/27/2025  9:16 AM)  Care Management Interventions: Advised patient to make appointment (2/27/2025  9:16 AM)  Has the patient kept scheduled appointments due by today?: Yes (2/27/2025  9:16 AM)  Care Management Interventions: Advised patient to keep appointment (2/27/2025  9:16 AM)    Self Management  What is the home health agency?: none (2/27/2025  9:16 AM)  Has home health visited the patient within 72 hours of discharge?: Not applicable (2/27/2025  9:16 AM)  What Durable Medical Equipment (DME) was ordered?: n/a (2/27/2025  9:16 AM)    Patient Teaching  Does the patient have access to their discharge instructions?: Yes (2/27/2025  9:16 AM)  Care Management Interventions: Reviewed instructions with patient (2/27/2025  9:16 AM)  What is the patient's perception of their health status since discharge?: Improving (2/27/2025  9:16 AM)  Is the patient/caregiver able to teach back the hierarchy of who to call/visit for symptoms/problems? PCP, Specialist, Home Health nurse, Urgent Care, ED, 911: Yes (2/27/2025  9:16 AM)  Patient/Caregiver Education Comments: see wrap up (2/27/2025  9:16 AM)

## 2025-02-28 LAB — HOLD SPECIMEN: NORMAL

## 2025-02-28 NOTE — ED PROVIDER NOTES
"Chief Complaint   Patient presents with    Vomiting     Pt states he vomited 3 times today.  States he was hospitalized recently for colitis.        Patient History    Past Medical History:   Diagnosis Date    GERD (gastroesophageal reflux disease)     Hypertension       Past Surgical History:   Procedure Laterality Date    OTHER SURGICAL HISTORY  09/20/2019    Appendectomy    SHOULDER SURGERY Left     TOTAL HIP ARTHROPLASTY Left       Family History   Problem Relation Name Age of Onset    No Known Problems Mother      No Known Problems Father      No Known Problems Sister      No Known Problems Brother        Social History     Social History Narrative    Not on file      Allergies   Allergen Reactions    Penicillins Swelling        PMH: Reviewed  PSH: Reviewed  Social History: Reviewed.   Allergies reviewed.     HPI: Jesus Rand \"Josh" is a 64 y.o. male who presents to the ED today accompanied by his son with complaints of vomiting.  Patient was recently hospitalized for colitis.  Went home yesterday.  States he feels dry and feels that his saliva feels like it stuck in his throat.  When he tries to clear it, he states he starts to gag and then he dry heaves.  Just picked up his oral antibiotics tonight after working all day today.       REVIEW OF SYSTEMS:  All other systems reviewed and negative except as listed in HPI.    PHYSICAL EXAM:    GENERAL: Vitals noted, no distress. Alert and oriented x 3. Non-toxic.      EENT: TMs clear. Posterior oropharynx unremarkable. EOMI, no nystagmus noted.     NECK: Supple. No masses. No midline tenderness. No meningeal signs.     CARDIAC: Regular rate, rhythm. No murmurs rubs or gallops. No JVD.    PULMONARY: Lungs clear and equal bilaterally. No wheezes rales or rhonchi. No respiratory distress.     ABDOMEN: Soft, nondistended, and mildly tender in the lower abd. No peritoneal signs. Bowel sounds are present and normoactive in all 4 quadrants. No pulsatile masses. "     EXTREMITIES: No peripheral edema.     SKIN: No rash. Warm, dry, and intact.     NEURO: No focal neurologic deficits.     Labs Reviewed   LIPASE - Abnormal       Result Value    Lipase 4 (*)     Narrative:     Venipuncture immediately after or during the administration of Metamizole may lead to falsely low results. Testing should be performed immediately prior to Metamizole dosing.   COMPREHENSIVE METABOLIC PANEL - Abnormal    Glucose 128 (*)     Sodium 132 (*)     Potassium 3.6      Chloride 95 (*)     Bicarbonate 24      Anion Gap 17      Urea Nitrogen 24 (*)     Creatinine 1.10      eGFR 76      Calcium 9.9      Albumin 5.0      Alkaline Phosphatase 59      Total Protein 8.1      AST 33      Bilirubin, Total 1.5 (*)     ALT 28     CBC WITH AUTO DIFFERENTIAL - Abnormal    WBC 16.2 (*)     nRBC 0.0      RBC 5.49      Hemoglobin 16.8      Hematocrit 47.3      MCV 86      MCH 30.6      MCHC 35.5      RDW 12.0      Platelets 211      Neutrophils % 86.3      Immature Granulocytes %, Automated 0.5      Lymphocytes % 6.1      Monocytes % 5.4      Eosinophils % 1.4      Basophils % 0.3      Neutrophils Absolute 13.97 (*)     Immature Granulocytes Absolute, Automated 0.08      Lymphocytes Absolute 0.99 (*)     Monocytes Absolute 0.87      Eosinophils Absolute 0.22      Basophils Absolute 0.05     URINALYSIS WITH REFLEX CULTURE AND MICROSCOPIC - Abnormal    Color, Urine Light-Yellow      Appearance, Urine Clear      Specific Gravity, Urine 1.015      pH, Urine 7.0      Protein, Urine 10 (TRACE)      Glucose, Urine Normal      Blood, Urine NEGATIVE      Ketones, Urine 40 (2+) (*)     Bilirubin, Urine NEGATIVE      Urobilinogen, Urine Normal      Nitrite, Urine NEGATIVE      Leukocyte Esterase, Urine NEGATIVE     LACTATE - Normal    Lactate 1.2      Narrative:     Venipuncture immediately after or during the administration of Metamizole may lead to falsely low results. Testing should be performed immediately prior to  Metamizole dosing.   URINALYSIS MICROSCOPIC WITH REFLEX CULTURE - Normal    WBC, Urine NONE      RBC, Urine 1-2     URINALYSIS WITH REFLEX CULTURE AND MICROSCOPIC    Narrative:     The following orders were created for panel order Urinalysis with Reflex Culture and Microscopic.  Procedure                               Abnormality         Status                     ---------                               -----------         ------                     Urinalysis with Reflex C...[[id number]]  Abnormal            Final result               Extra Urine Gray Tube[[id number]]                            In process                   Please view results for these tests on the individual orders.   EXTRA URINE GRAY TUBE        No orders to display        Medical Decision Making         ED COURSE: This patient was seen and examined by myself independently. IV established. Labs drawn and noted above. Hydrated with NS 1L bolus and medicated with toradol and zofran IV. He continues with nausea and pain, given 2nd dose of IV zofran and then IV morphine for pain. 2nd liter NS given after elevated BUN and ketones noted. He then tells me that he picked up his antibiotics but didn't take a dose due to the nausea, so I ordered oral antibiotics here in the ED. As long as he tolerates this, he will be discharged home with a work note.           Differential Diagnoses Considered: gastritis, gastroenteritis, colitis, diverticulitis    Chronic Medical Conditions Significantly Affecting Care: see above    External Records Reviewed: I reviewed recent and relevant outside records including: PCP notes, prior discharge summary, previous radiologic studies    Diagnostic testing considered: blood, urine, CT    Prescription Drug Consideration: antiemetics        DIAGNOSTIC IMPRESSION: #1 n/v #2 colitis      Marely Oliver, MANI-CNP  02/27/25 [id number]

## 2025-03-01 LAB
BACTERIA BLD CULT: NORMAL
BACTERIA BLD CULT: NORMAL

## 2025-03-18 ENCOUNTER — APPOINTMENT (OUTPATIENT)
Dept: PRIMARY CARE | Facility: CLINIC | Age: 65
End: 2025-03-18
Payer: COMMERCIAL

## 2025-03-21 ENCOUNTER — PATIENT OUTREACH (OUTPATIENT)
Dept: PRIMARY CARE | Facility: CLINIC | Age: 65
End: 2025-03-21
Payer: COMMERCIAL

## 2025-04-03 ASSESSMENT — DUKE ACTIVITY SCORE INDEX (DASI)
CAN YOU PARTICIPATE IN MODERATE RECREATIONAL ACTIVITIES LIKE GOLF, BOWLING, DANCING, DOUBLES TENNIS OR THROWING A BASEBALL OR FOOTBALL: NO
CAN YOU DO YARD WORK LIKE RAKING LEAVES, WEEDING OR PUSHING A MOWER: NO
CAN YOU WALK INDOORS, SUCH AS AROUND YOUR HOUSE: YES
DASI METS SCORE: 5.7
CAN YOU HAVE SEXUAL RELATIONS: YES
CAN YOU DO HEAVY WORK AROUND THE HOUSE LIKE SCRUBBING FLOORS OR LIFTING AND MOVING HEAVY FURNITURE: NO
CAN YOU TAKE CARE OF YOURSELF (EAT, DRESS, BATHE, OR USE TOILET): YES
CAN YOU WALK A BLOCK OR TWO ON LEVEL GROUND: YES
CAN YOU PARTICIPATE IN STRENOUS SPORTS LIKE SWIMMING, SINGLES TENNIS, FOOTBALL, BASKETBALL, OR SKIING: NO
CAN YOU DO LIGHT WORK AROUND THE HOUSE LIKE DUSTING OR WASHING DISHES: YES
CAN YOU CLIMB A FLIGHT OF STAIRS OR WALK UP A HILL: YES
CAN YOU DO MODERATE WORK AROUND THE HOUSE LIKE VACUUMING, SWEEPING FLOORS OR CARRYING GROCERIES: YES
CAN YOU RUN A SHORT DISTANCE: NO
TOTAL_SCORE: 24.2

## 2025-04-03 ASSESSMENT — ACTIVITIES OF DAILY LIVING (ADL): ADL_SCORE: 0

## 2025-04-03 ASSESSMENT — LIFESTYLE VARIABLES: SMOKING_STATUS: NONSMOKER

## 2025-04-04 ENCOUNTER — LAB (OUTPATIENT)
Dept: LAB | Facility: HOSPITAL | Age: 65
End: 2025-04-04
Payer: COMMERCIAL

## 2025-04-04 ENCOUNTER — PRE-ADMISSION TESTING (OUTPATIENT)
Dept: PREADMISSION TESTING | Facility: HOSPITAL | Age: 65
End: 2025-04-04
Payer: COMMERCIAL

## 2025-04-04 VITALS
SYSTOLIC BLOOD PRESSURE: 130 MMHG | DIASTOLIC BLOOD PRESSURE: 80 MMHG | RESPIRATION RATE: 16 BRPM | WEIGHT: 217.59 LBS | HEART RATE: 69 BPM | BODY MASS INDEX: 30.46 KG/M2 | OXYGEN SATURATION: 95 % | HEIGHT: 71 IN | TEMPERATURE: 97 F

## 2025-04-04 DIAGNOSIS — R73.09 OTHER ABNORMAL GLUCOSE: ICD-10-CM

## 2025-04-04 DIAGNOSIS — Z01.818 ENCOUNTER FOR OTHER PREPROCEDURAL EXAMINATION: ICD-10-CM

## 2025-04-04 DIAGNOSIS — R73.9 TEMPORARY INCREASE IN BLOOD SUGAR FOLLOWING PROCEDURE: ICD-10-CM

## 2025-04-04 DIAGNOSIS — M48.02 FORAMINAL STENOSIS OF CERVICAL REGION: ICD-10-CM

## 2025-04-04 DIAGNOSIS — M48.02 SPINAL STENOSIS, CERVICAL REGION: Primary | ICD-10-CM

## 2025-04-04 DIAGNOSIS — Z01.818 PREOP EXAMINATION: ICD-10-CM

## 2025-04-04 DIAGNOSIS — R19.7 DIARRHEA, UNSPECIFIED TYPE: ICD-10-CM

## 2025-04-04 DIAGNOSIS — Z01.818 PREOP EXAMINATION: Primary | ICD-10-CM

## 2025-04-04 LAB
ABO GROUP (TYPE) IN BLOOD: NORMAL
ANION GAP SERPL CALC-SCNC: 13 MMOL/L (ref 10–20)
ANTIBODY SCREEN: NORMAL
APTT PPP: 29 SECONDS (ref 26–36)
ATRIAL RATE: 59 BPM
BASOPHILS # BLD AUTO: 0.06 X10*3/UL (ref 0–0.1)
BASOPHILS NFR BLD AUTO: 0.7 %
BUN SERPL-MCNC: 22 MG/DL (ref 6–23)
CALCIUM SERPL-MCNC: 9.4 MG/DL (ref 8.6–10.3)
CHLORIDE SERPL-SCNC: 100 MMOL/L (ref 98–107)
CO2 SERPL-SCNC: 29 MMOL/L (ref 21–32)
CREAT SERPL-MCNC: 1.13 MG/DL (ref 0.5–1.3)
EGFRCR SERPLBLD CKD-EPI 2021: 73 ML/MIN/1.73M*2
EOSINOPHIL # BLD AUTO: 0.1 X10*3/UL (ref 0–0.7)
EOSINOPHIL NFR BLD AUTO: 1.2 %
ERYTHROCYTE [DISTWIDTH] IN BLOOD BY AUTOMATED COUNT: 12.1 % (ref 11.5–14.5)
EST. AVERAGE GLUCOSE BLD GHB EST-MCNC: 123 MG/DL
GLUCOSE SERPL-MCNC: 117 MG/DL (ref 74–99)
HBA1C MFR BLD: 5.9 %
HCT VFR BLD AUTO: 42.1 % (ref 41–52)
HGB BLD-MCNC: 14.5 G/DL (ref 13.5–17.5)
IMM GRANULOCYTES # BLD AUTO: 0.02 X10*3/UL (ref 0–0.7)
IMM GRANULOCYTES NFR BLD AUTO: 0.2 % (ref 0–0.9)
INR PPP: 1.1 (ref 0.9–1.1)
LYMPHOCYTES # BLD AUTO: 1.37 X10*3/UL (ref 1.2–4.8)
LYMPHOCYTES NFR BLD AUTO: 16.9 %
MCH RBC QN AUTO: 31.3 PG (ref 26–34)
MCHC RBC AUTO-ENTMCNC: 34.4 G/DL (ref 32–36)
MCV RBC AUTO: 91 FL (ref 80–100)
MONOCYTES # BLD AUTO: 0.8 X10*3/UL (ref 0.1–1)
MONOCYTES NFR BLD AUTO: 9.9 %
NEUTROPHILS # BLD AUTO: 5.77 X10*3/UL (ref 1.2–7.7)
NEUTROPHILS NFR BLD AUTO: 71.1 %
NRBC BLD-RTO: 0 /100 WBCS (ref 0–0)
P AXIS: 64 DEGREES
P OFFSET: 207 MS
P ONSET: 152 MS
PLATELET # BLD AUTO: 294 X10*3/UL (ref 150–450)
POTASSIUM SERPL-SCNC: 3.8 MMOL/L (ref 3.5–5.3)
PR INTERVAL: 120 MS
PREALB SERPL-MCNC: 24.9 MG/DL (ref 18–40)
PROTHROMBIN TIME: 12.2 SECONDS (ref 9.8–12.4)
Q ONSET: 212 MS
QRS COUNT: 9 BEATS
QRS DURATION: 86 MS
QT INTERVAL: 410 MS
QTC CALCULATION(BAZETT): 405 MS
QTC FREDERICIA: 407 MS
R AXIS: -38 DEGREES
RBC # BLD AUTO: 4.64 X10*6/UL (ref 4.5–5.9)
RH FACTOR (ANTIGEN D): NORMAL
SODIUM SERPL-SCNC: 138 MMOL/L (ref 136–145)
T AXIS: 34 DEGREES
T OFFSET: 417 MS
VENTRICULAR RATE: 59 BPM
WBC # BLD AUTO: 8.1 X10*3/UL (ref 4.4–11.3)

## 2025-04-04 PROCEDURE — 36415 COLL VENOUS BLD VENIPUNCTURE: CPT

## 2025-04-04 PROCEDURE — 85025 COMPLETE CBC W/AUTO DIFF WBC: CPT

## 2025-04-04 PROCEDURE — 86850 RBC ANTIBODY SCREEN: CPT

## 2025-04-04 PROCEDURE — 83036 HEMOGLOBIN GLYCOSYLATED A1C: CPT

## 2025-04-04 PROCEDURE — 93005 ELECTROCARDIOGRAM TRACING: CPT

## 2025-04-04 PROCEDURE — 86901 BLOOD TYPING SEROLOGIC RH(D): CPT

## 2025-04-04 PROCEDURE — 85730 THROMBOPLASTIN TIME PARTIAL: CPT

## 2025-04-04 PROCEDURE — 85610 PROTHROMBIN TIME: CPT

## 2025-04-04 PROCEDURE — 80048 BASIC METABOLIC PNL TOTAL CA: CPT

## 2025-04-04 PROCEDURE — 99202 OFFICE O/P NEW SF 15 MIN: CPT | Performed by: NURSE PRACTITIONER

## 2025-04-04 PROCEDURE — 84134 ASSAY OF PREALBUMIN: CPT

## 2025-04-04 PROCEDURE — 86900 BLOOD TYPING SEROLOGIC ABO: CPT

## 2025-04-04 PROCEDURE — 87081 CULTURE SCREEN ONLY: CPT | Mod: STJLAB

## 2025-04-04 RX ORDER — CHLORHEXIDINE GLUCONATE ORAL RINSE 1.2 MG/ML
SOLUTION DENTAL
Qty: 473 ML | Refills: 0 | Status: SHIPPED | OUTPATIENT
Start: 2025-04-04

## 2025-04-04 NOTE — CPM/PAT H&P
"CPM/PAT Evaluation       Name: Jesus Rand (Jesus Rand \"David\")  /Age: 1960/65 y.o.     In-Person     HPI 66 yo male with history of HTN, OA, GERD and spinal stenosis is here today for preop evaluation for right sided neck pain and chest pain that radiates down his arm. Had a cardiac workup that was negative.  Cervical spine imaging showed spinal stenosis. Tried Gabapentin and PT without success.    Past Medical History:   Diagnosis Date    Allergic childhood    Angina pectoris     from pinched nerve    Anxiety May of 2022    Arthritis May of 2022    Depression May of 2022    GERD (gastroesophageal reflux disease)     Hypertension     Spinal stenosis        Past Surgical History:   Procedure Laterality Date    APPENDECTOMY  1979    HIP ARTHROPLASTY      JOINT REPLACEMENT  2024    OTHER SURGICAL HISTORY  2019    Appendectomy    ROTATOR CUFF REPAIR  1998    left shoulder    SHOULDER SURGERY Left     TOTAL HIP ARTHROPLASTY Left        Patient Sexual activity questions deferred to the physician.    Family History   Problem Relation Name Age of Onset    Arthritis Mother Cheryl Rand     Stroke Mother Cheryl Rand     COPD Father Javan Rand     No Known Problems Sister      Accidental death Brother Javan Rand Jr.        Allergies   Allergen Reactions    Penicillins Swelling       Prior to Admission medications    Medication Sig Start Date End Date Taking? Authorizing Provider   amLODIPine (Norvasc) 10 mg tablet Take 1 tablet (10 mg) by mouth once daily. 3/29/24 4/4/25  Dave Galindo MD MPH   aspirin 81 mg EC tablet TAKE 1 TABLET BY MOUTH EVERY DAY 10/24/24   Alexis Stewart MD   carvedilol (Coreg) 12.5 mg tablet Take 1 tablet (12.5 mg) by mouth 2 times a day with meals. 3/29/24 4/4/25  Dave Galindo MD MPH   chlorthalidone (Hygroton) 25 mg tablet Take 1 tablet (25 mg) by mouth once daily. 24  Alonzo Tierney, APRN-CNP   cyclobenzaprine (Flexeril) 10 mg tablet Take 1 tablet " (10 mg) by mouth 3 times a day as needed for muscle spasms for up to 10 days.  Patient not taking: Reported on 4/4/2025 12/11/24 12/21/24  MANI Ramirez-CNP   dicyclomine (Bentyl) 20 mg tablet Take 1 tablet (20 mg) by mouth 4 times a day before meals.  Patient not taking: Reported on 4/4/2025 8/5/24   Zeb Hull, DO   ibuprofen-diphenhydramine HCl (Ibuprofen PM) 200-25 mg capsule per capsule Take 2 capsules by mouth once daily at bedtime.    Historical Provider, MD   ondansetron ODT (Zofran-ODT) 4 mg disintegrating tablet Take 1 tablet (4 mg) by mouth every 8 hours if needed for nausea or vomiting.  Patient not taking: Reported on 4/4/2025 8/5/24   Zeb Hlul,    pantoprazole (ProtoNix) 40 mg EC tablet Take 1 tablet (40 mg) by mouth 2 times a day. Do not crush, chew, or split.  Patient not taking: Reported on 4/4/2025 8/29/24 8/29/25  Kyaw Michael,    ibuprofen 200 mg tablet Take 2 tablets (400 mg) by mouth every 6 hours if needed for mild pain (1 - 3).  4/4/25  Historical Provider, MD      Constitutional: Negative for fever, chills, or sweats   ENMT: glasses. Negative for nasal discharge, congestion, ear pain, mouth pain, throat pain   Respiratory: Negative for cough, wheezing, shortness of breath   Cardiac: Negative for chest pain, dyspnea on exertion, palpitations   Gastrointestinal:+recent bout colitis- still having diarrhea. Negative for nausea, vomiting, constipation  Genitourinary: Negative for dysuria, flank pain, frequency, hematuria  Musculoskeletal:see hpi  Neurological: Negative for dizziness, confusion, headache  Psychiatric: Negative for mood changes   Skin: Negative for itching, rash, ulcer    Hematologic/Lymph: Negative for bruising, easy bleeding  Allergic/Immunologic: Negative itching, sneezing, swelling    CONSTITUTIONAL - well nourished, well developed, looks like stated age  SKIN - abrasions both hands healing-no signs or symptoms of infection. normal skin color and  "pigmentation, no rash or lesions  HEAD - no trauma, normocephalic  EYES - glasses. pupils are equal and reactive to light, extraocular muscles are intact  ENT - uvula midline, normal tongue movement and throat normal, no exudate, nasal passage without discharge and patent  NECK - decreased ROM, supple without rigidity  CHEST - clear to auscultation, no wheezing, no crackles and no rales, good effort  CARDIAC - regular rate and regular rhythm, no skipped beats, no murmur  ABDOMEN - no organomegaly, soft, nontender, nondistended, normal bowel sounds, no guarding/rebound/rigidity   EXTREMITIES - no edema, no deformities  NEUROLOGICAL - normal gait, normal balance, normal motor; alert, oriented and no focal signs  PSYCHIATRIC - alert, pleasant and cordial, age-appropriate  IMMUNOLOGIC - no cervical lymphadenopathy      PAT AIRWAY:   Airway:     Mallampati::  II    Neck ROM::  Limited  normal      Visit Vitals  /80   Pulse 69   Temp 36.1 °C (97 °F) (Temporal)   Resp 16   Ht 1.803 m (5' 11\")   Wt 98.7 kg (217 lb 9.5 oz)   SpO2 95%   BMI 30.35 kg/m²   Smoking Status Never   BSA 2.22 m²       DASI Risk Score      Flowsheet Row Pre-Admission Testing from 4/4/2025 in [facility] Questionnaire Series Submission from 2/19/2025 in [facility] with Generic Provider Eun   Can you take care of yourself (eat, dress, bathe, or use toilet)?  2.75 filed at 04/03/2025 1337 2.75  filed at 02/19/2025 0723   Can you walk indoors, such as around your house? 1.75 filed at 04/03/2025 1337 1.75  filed at 02/19/2025 0723   Can you walk a block or two on level ground?  2.75 filed at 04/03/2025 1337 2.75  filed at 02/19/2025 0723   Can you climb a flight of stairs or walk up a hill? 5.5 filed at 04/03/2025 1337 5.5  filed at 02/19/2025 0723   Can you run a short distance? 0 filed at 04/03/2025 1337 8  filed at 02/19/2025 0723   Can you do light work around the house like dusting or washing dishes? 2.7 filed at 04/03/2025 " 1337 2.7  filed at 02/19/2025 0723   Can you do moderate work around the house like vacuuming, sweeping floors or carrying groceries? 3.5 filed at 04/03/2025 1337 3.5  filed at 02/19/2025 0723   Can you do heavy work around the house like scrubbing floors or lifting and moving heavy furniture?  0 filed at 04/03/2025 1337 0  filed at 02/19/2025 0723   Can you do yard work like raking leaves, weeding or pushing a mower? 0 filed at 04/03/2025 1337 4.5  filed at 02/19/2025 0723   Can you have sexual relations? 5.25 filed at 04/03/2025 1337 5.25  filed at 02/19/2025 0723   Can you participate in moderate recreational activities like golf, bowling, dancing, doubles tennis or throwing a baseball or football? 0 filed at 04/03/2025 1337 0  filed at 02/19/2025 0723   Can you participate in strenous sports like swimming, singles tennis, football, basketball, or skiing? 0 filed at 04/03/2025 1337 0  filed at 02/19/2025 0723   DASI SCORE 24.2 filed at 04/03/2025 1337 36.7  filed at 02/19/2025 0723   METS Score (Will be calculated only when all the questions are answered) 5.7 filed at 04/03/2025 1337 7.3  filed at 02/19/2025 0723          Caprini DVT Assessment      Flowsheet Row Pre-Admission Testing from 4/4/2025 in [facility] ED to Hosp-Admission (Discharged) from 2/24/2025 in [facility] 3 with Nas Bronson, DO   DVT Score (IF A SCORE IS NOT CALCULATING, MUST SELECT A BMI TO COMPLETE) -- 3 filed at 02/25/2025 0133   Surgical Factors Major surgery planned, including arthroscopic and laproscopic (1-2 hours) filed at 04/03/2025 1335 --   BMI (BMI MUST BE CHOSEN) -- 30 or less filed at 02/25/2025 0133          Modified Frailty Index      Flowsheet Row Pre-Admission Testing from 4/4/2025 in [facility]   Non-independent functional status (problems with dressing, bathing, personal grooming, or cooking) 0 filed at 04/03/2025 1332   History of diabetes mellitus  0 filed at 04/03/2025  1338   History of COPD 0 filed at 04/03/2025 1338   History of CHF No filed at 04/03/2025 1338   History of MI 0 filed at 04/03/2025 1338   History of Percutaneous Coronary Intervention, Cardiac Surgery, or Angina No filed at 04/03/2025 1338   Hypertension requiring the use of medication  0.0909 filed at 04/03/2025 1338   Peripheral vascular disease 0 filed at 04/03/2025 1338   Impaired sensorium (cognitive impairement or loss, clouding, or delirium) 0 filed at 04/03/2025 1338   TIA or CVA withouy residual deficit 0 filed at 04/03/2025 1338   Cerebrovascular accident with deficit 0 filed at 04/03/2025 1338   Modified Frailty Index Calculator .0909 filed at 04/03/2025 1338          [id number] Stroke Risk Points  Current as of just now        N/A 0 to 9 Points:      Last Change: N/A          The [id number]-[id number] risk score (Lip NANCY, et al. 2009. © 2010 [employer]) quantifies the risk of stroke for a patient with atrial fibrillation. For patients without atrial fibrillation or under the age of 18 this score appears as N/A. Higher score values generally indicate higher risk of stroke.        This score is not applicable to this patient. Components are not calculated.          Revised Cardiac Risk Index      Flowsheet Row Pre-Admission Testing from 4/4/2025 in [facility]   High-Risk Surgery (Intraperitoneal, Intrathoracic,Suprainguinal vascular) 0 filed at 04/03/2025 1337   History of ischemic heart disease (History of MI, History of positive exercuse test, Current chest paint considered due to myocardial ischemia, Use of nitrate therapy, ECG with pathological Q Waves) 0 filed at 04/03/2025 1337   History of congestive heart failure (pulmonary edemia, bilateral rales or S3 gallop, Paroxysmal nocturnal dyspnea, CXR showing pulmonary vascular redistribution) 0 filed at 04/03/2025 1337   History of cerebrovascular disease (Prior TIA or stroke) 0 filed at 04/03/2025 1337   Pre-operative  insulin treatment 0 filed at 2025 1337   Pre-operative creatinine>2 mg/dl 0 filed at 2025 1337   Revised Cardiac Risk Calculator 0 filed at 2025 1337          Apfel Simplified Score      Flowsheet Row [facility]Ad[facility]   Smoking status 1 filed at 2025 1338   History of motion sickness or PONV  0 filed at 2025 1338   Use of postoperative opioids 0 filed at 2025 1338   Gender - Female 0=No filed at 2025 1338   Apfel Simplified Score Calculator 1 filed at 2025 1338          Risk Analysis Index Results This Encounter         4/3/2025  1338             Do you live in a place other than your own home?: 0    When did you begin living in the place you are currently residing?: Greater than one year ago    Any kidney failure, kidney not working well, or seeing a kidney doctor (nephrologist)? If yes, was this for kidney stones or another problem?: 0 No    Any history of chronic (long-term) congestive heart failure (CHF)?: 0 No    Any shortness of breath when resting?: 0 No    In the past five years, have you been diagnosed with or treated for cancer?: No    During the last 3 months has it become difficult for you to remember things or organize your thoughts?: 0 No    Have you lost weight of 10 pounds or more in the past 3 months without trying?: 0 No    Do you have any loss of appetitie?: 0 No    Getting Around (Mobility): 0 Can get around without help    Eatin Can plan and prepare own meals    Toiletin Can use toilet without any help    Personal Hygiene (Bathing, Hand Washing, Changing Clothes): 0 Can shower or bathe without any help    DA SILVA Cancer History: Patient does not indicate history of cancer    Total Risk Analysis Index Score Without Cancer: 21    Total Risk Analysis Index Score: 21          Stop Bang Score      Flowsheet Row Pre-Admission Testing from [facility]25 in [facility] Questionnaire Series  Submission from 2/19/2025 in [facility] with Generic Provider Eun   Do you snore loudly? 0 filed at 04/03/2025 1336 0  filed at 02/19/2025 0723   Do you often feel tired or fatigued after your sleep? -- 1  filed at 02/19/2025 0723   Has anyone ever observed you stop breathing in your sleep? 0 filed at 04/03/2025 1336 0  filed at 02/19/2025 0723   Do you have or are you being treated for high blood pressure? 1 filed at 04/03/2025 1336 1  filed at 02/19/2025 0723   Recent BMI (Calculated) 30.7 filed at 04/03/2025 1336 30.7  filed at 02/19/2025 0723   Is BMI greater than 35 kg/m2? 0=No filed at 04/03/2025 1336 0=No  filed at 02/19/2025 0723   Age older than 50 years old? 1=Yes filed at 04/03/2025 1336 1=Yes  filed at 02/19/2025 0723   Is your neck circumference greater than 17 inches (Male) or 16 inches (Female)? 0 filed at 04/03/2025 1336 --   Gender - Male 1=Yes filed at 04/03/2025 1336 1=Yes  filed at 02/19/2025 0723          Prodigy: High Risk  Total Score: 16              Prodigy Age Score      Prodigy Gender Score          ARISCAT Score for Postoperative Pulmonary Complications      Flowsheet Row Pre-Admission Testing from 4/4/2025 in [facility]   Age Calculated Score 3 filed at 04/03/2025 1339   Preoperative SpO2 0 filed at 04/03/2025 1339   Respiratory infection in the last month Either upper or lower (i.e., URI, bronchitis, pneumonia), with fever and antibiotic treatment 0 filed at 04/03/2025 1339   Preoperative anemia (Hgb less than 10 g/dl) 0 filed at 04/03/2025 1339   Surgical incision  0 filed at 04/03/2025 1339   Duration of surgery  0 filed at 04/03/2025 1339   Emergency Procedure  0 filed at 04/03/2025 1339   ARISCAT Total Score  3 filed at 04/03/2025 1959          Jose Perioperative Risk for Myocardial Infarction or Cardiac Arrest (NAREN)      Flowsheet Row Pre-Admission Testing from 4/4/2025 in [facility]   Calculated Age Score 1.26 filed at 04/03/2025 0882    Functional Status  0 filed at 2025 1339   ASA Class  -3.29 filed at 2025 1339   Creatinine 0 filed at 2025 1339   Type of Procedure  0.21 filed at 2025 1339   NAREN Total Score  -7.07 filed at 2025 1339   NAREN % 0.08 filed at 2025 1339            Assessment and Plan:   64 yo male scheduled for right C7-T1 foraminotomy and discectomy with Dr. Bellamy 2025. Labs/EKG ordered.    Cardiac  DASI Score: 24.2   MET Score: 5.7    HTN- controlled on current med regimen    RCRI  0 which is 3.9% 30 day risk of MACE (risk for cardiac death, nonfatal myocardial infarction, and nonfactal cardiac arrest)  NAREN score which indicates a 0.08  % risk of intraoperative or 30-day postoperative MACE    Preoperative deep breathing educational handout provided to patient.  STOP BAN  points which is a intermediate risk for moderate to severe PEE  ARISCAT:    3  points which is a intermediate (13.3%) risk of in-hospital post-op pulmonary complications     Apfel: 1 points 21% risk for post operative N/V    GI: recent hospitalization for colitis treated with antibiotics. Symptoms improved but 4 days ago he developed 5-6 episodes of diarrhea daily with strong odor and RLQ abdominal cramping. Labs ordered today along with Cdiff. Notified Dr. Bellamy via email. Instructed patient to notify Dr. Bellamy along with his GI doctor.     Skin check: Patient was instructed to make surgeon aware of any skin changes/concerns prior to surgery.     Anesthesia:  Patient denies any anesthesia complications.     See risk scores as previously documented.

## 2025-04-04 NOTE — PREPROCEDURE INSTRUCTIONS
Medication List            Accurate as of April 4, 2025  8:55 AM. Always use your most recent med list.                amLODIPine 10 mg tablet  Commonly known as: Norvasc  Take 1 tablet (10 mg) by mouth once daily.  Medication Adjustments for Surgery: Take/Use as prescribed     aspirin 81 mg EC tablet  TAKE 1 TABLET BY MOUTH EVERY DAY  Additional Medication Adjustments for Surgery: Other (Comment)  Notes to patient: Per provider recommendations     carvedilol 12.5 mg tablet  Commonly known as: Coreg  Take 1 tablet (12.5 mg) by mouth 2 times a day with meals.  Medication Adjustments for Surgery: Take/Use as prescribed     chlorhexidine 0.12 % solution  Commonly known as: Peridex  15 milliliter(s) orally once a day for 2 doses 15 ml  the night before surgery and 15 ml morning of surgery - swish for 30 seconds -DO NOT SWALLOW, SPIT OUT  Medication Adjustments for Surgery: Take/Use as prescribed     chlorthalidone 25 mg tablet  Commonly known as: Hygroton  Take 1 tablet (25 mg) by mouth once daily.  Medication Adjustments for Surgery: Take/Use as prescribed     cyclobenzaprine 10 mg tablet  Commonly known as: Flexeril  Take 1 tablet (10 mg) by mouth 3 times a day as needed for muscle spasms for up to 10 days.  Medication Adjustments for Surgery: Take/Use as prescribed     dicyclomine 20 mg tablet  Commonly known as: Bentyl  Take 1 tablet (20 mg) by mouth 4 times a day before meals.  Medication Adjustments for Surgery: Take/Use as prescribed     Ibuprofen -25 mg capsule per capsule  Generic drug: ibuprofen-diphenhydramine HCl  Additional Medication Adjustments for Surgery: Take last dose 7 days before surgery     ondansetron ODT 4 mg disintegrating tablet  Commonly known as: Zofran-ODT  Take 1 tablet (4 mg) by mouth every 8 hours if needed for nausea or vomiting.  Medication Adjustments for Surgery: Take/Use as prescribed     pantoprazole 40 mg EC tablet  Commonly known as: ProtoNix  Take 1 tablet (40 mg) by  mouth 2 times a day. Do not crush, chew, or split.  Medication Adjustments for Surgery: Take/Use as prescribed                          PRE-OPERATIVE INSTRUCTIONS    You will receive notification one business day prior to your procedure to confirm your arrival time. It is important that you answer your phone and/or check your messages during this time. If you do not hear from the surgery center by 5 pm. the day before your procedure, please call [phone].     Please enter the building through the Outpatient entrance and take the elevator off the lobby to the 2nd floor then check in at the Outpatient Surgery desk on the 2nd floor.    INSTRUCTIONS:  Talk to your surgeon for instructions if you should stop your aspirin, blood thinner, or diabetes medicines.  DO NOT take any multivitamins or over the counter supplements for 7-10 days before surgery.  If not being admitted, you must have an adult immediately available to drive you home after surgery. We also highly recommend you have someone stay with you for the entire day and night of your surgery.  For children having surgery, a parent or legal guardian must accompany them to the surgery center. If this is not possible, please call [phone] to make additional arrangements.  For adults who are unable to consent or make medical decisions for themselves, a legal guardian or Power of  must accompany them to the surgery center. If this is not possible, please call [phone] to make additional arrangements.  Wear comfortable, loose fitting clothing.  All jewelry and piercings must be removed. If you are unable to remove an item or have a dermal piercing, please be sure to tell the nurse when you arrive for surgery.  Nail polish and make-up must be removed.  Avoid smoking or consuming alcohol for 24 hours before surgery.  To help prevent infection, please take a shower/bath and wash your hair the night before and/or morning of surgery (or follow other  specific bathing instructions provided).    Preoperative Fasting Guidelines    Why must I stop eating and drinking near surgery time?  With sedation, food or liquid in your stomach can enter your lungs causing serious complications  Increases nausea and vomiting    When do I need to stop eating and drinking before my surgery?  Do not eat any solid food after midnight the night before your surgery/procedure unless otherwise instructed by your surgeon.   You may have up to 13.5 ounces of clear liquid until TWO hours before your instructed arrival time to the hospital.  This includes water, black tea/coffee, (no milk or cream) apple juice, and electrolyte drinks (Gatorade).   You may chew gum until TWO hours before your surgery/procedure      If applicable, notify your surgeons office immediately of any new skin changes that occur to the surgical limb.      If you have any questions or concerns, please call Pre-Admission Testing at [phone].

## 2025-04-04 NOTE — H&P (VIEW-ONLY)
"CPM/PAT Evaluation       Name: Jesus Rand (Jesus Rand \"David\")  /Age: 1960/65 y.o.     In-Person     HPI 66 yo male with history of HTN, OA, GERD and spinal stenosis is here today for preop evaluation for right sided neck pain and chest pain that radiates down his arm. Had a cardiac workup that was negative.  Cervical spine imaging showed spinal stenosis. Tried Gabapentin and PT without success.    Past Medical History:   Diagnosis Date    Allergic childhood    Angina pectoris     from pinched nerve    Anxiety May of 2022    Arthritis May of 2022    Depression May of 2022    GERD (gastroesophageal reflux disease)     Hypertension     Spinal stenosis        Past Surgical History:   Procedure Laterality Date    APPENDECTOMY  1979    HIP ARTHROPLASTY      JOINT REPLACEMENT  2024    OTHER SURGICAL HISTORY  2019    Appendectomy    ROTATOR CUFF REPAIR  1998    left shoulder    SHOULDER SURGERY Left     TOTAL HIP ARTHROPLASTY Left        Patient Sexual activity questions deferred to the physician.    Family History   Problem Relation Name Age of Onset    Arthritis Mother Cheryl Radn     Stroke Mother Cheryl Rand     COPD Father Javan Rand     No Known Problems Sister      Accidental death Brother Javan Rand Jr.        Allergies   Allergen Reactions    Penicillins Swelling       Prior to Admission medications    Medication Sig Start Date End Date Taking? Authorizing Provider   amLODIPine (Norvasc) 10 mg tablet Take 1 tablet (10 mg) by mouth once daily. 3/29/24 4/4/25  Dave Galindo MD MPH   aspirin 81 mg EC tablet TAKE 1 TABLET BY MOUTH EVERY DAY 10/24/24   Alexis Stewart MD   carvedilol (Coreg) 12.5 mg tablet Take 1 tablet (12.5 mg) by mouth 2 times a day with meals. 3/29/24 4/4/25  Dave Galindo MD MPH   chlorthalidone (Hygroton) 25 mg tablet Take 1 tablet (25 mg) by mouth once daily. 24  Alonzo Tierney, APRN-CNP   cyclobenzaprine (Flexeril) 10 mg tablet Take 1 tablet " (10 mg) by mouth 3 times a day as needed for muscle spasms for up to 10 days.  Patient not taking: Reported on 4/4/2025 12/11/24 12/21/24  MANI Ramirez-CNP   dicyclomine (Bentyl) 20 mg tablet Take 1 tablet (20 mg) by mouth 4 times a day before meals.  Patient not taking: Reported on 4/4/2025 8/5/24   Zeb Hull, DO   ibuprofen-diphenhydramine HCl (Ibuprofen PM) 200-25 mg capsule per capsule Take 2 capsules by mouth once daily at bedtime.    Historical Provider, MD   ondansetron ODT (Zofran-ODT) 4 mg disintegrating tablet Take 1 tablet (4 mg) by mouth every 8 hours if needed for nausea or vomiting.  Patient not taking: Reported on 4/4/2025 8/5/24   Zeb Hull,    pantoprazole (ProtoNix) 40 mg EC tablet Take 1 tablet (40 mg) by mouth 2 times a day. Do not crush, chew, or split.  Patient not taking: Reported on 4/4/2025 8/29/24 8/29/25  Kywa Michael,    ibuprofen 200 mg tablet Take 2 tablets (400 mg) by mouth every 6 hours if needed for mild pain (1 - 3).  4/4/25  Historical Provider, MD      Constitutional: Negative for fever, chills, or sweats   ENMT: glasses. Negative for nasal discharge, congestion, ear pain, mouth pain, throat pain   Respiratory: Negative for cough, wheezing, shortness of breath   Cardiac: Negative for chest pain, dyspnea on exertion, palpitations   Gastrointestinal:+recent bout colitis- still having diarrhea. Negative for nausea, vomiting, constipation  Genitourinary: Negative for dysuria, flank pain, frequency, hematuria  Musculoskeletal:see hpi  Neurological: Negative for dizziness, confusion, headache  Psychiatric: Negative for mood changes   Skin: Negative for itching, rash, ulcer    Hematologic/Lymph: Negative for bruising, easy bleeding  Allergic/Immunologic: Negative itching, sneezing, swelling    CONSTITUTIONAL - well nourished, well developed, looks like stated age  SKIN - abrasions both hands healing-no signs or symptoms of infection. normal skin color and  "pigmentation, no rash or lesions  HEAD - no trauma, normocephalic  EYES - glasses. pupils are equal and reactive to light, extraocular muscles are intact  ENT - uvula midline, normal tongue movement and throat normal, no exudate, nasal passage without discharge and patent  NECK - decreased ROM, supple without rigidity  CHEST - clear to auscultation, no wheezing, no crackles and no rales, good effort  CARDIAC - regular rate and regular rhythm, no skipped beats, no murmur  ABDOMEN - no organomegaly, soft, nontender, nondistended, normal bowel sounds, no guarding/rebound/rigidity   EXTREMITIES - no edema, no deformities  NEUROLOGICAL - normal gait, normal balance, normal motor; alert, oriented and no focal signs  PSYCHIATRIC - alert, pleasant and cordial, age-appropriate  IMMUNOLOGIC - no cervical lymphadenopathy      PAT AIRWAY:   Airway:     Mallampati::  II    Neck ROM::  Limited  normal      Visit Vitals  /80   Pulse 69   Temp 36.1 °C (97 °F) (Temporal)   Resp 16   Ht 1.803 m (5' 11\")   Wt 98.7 kg (217 lb 9.5 oz)   SpO2 95%   BMI 30.35 kg/m²   Smoking Status Never   BSA 2.22 m²       DASI Risk Score      Flowsheet Row [place] [facility] [place]19/2025 in [facility] with Generic Provider Eun   Can you take care of yourself (eat, dress, bathe, or use toilet)?  2.75 filed at 04/03/2025 1337 2.75  filed at 02/19/2025 0723   Can you walk indoors, such as around your house? 1.75 filed at 04/03/2025 1337 1.75  filed at 02/19/2025 0723   Can you walk a block or two on level ground?  2.75 filed at 04/03/2025 1337 2.75  filed at 02/19/2025 0723   Can you climb a flight of stairs or walk up a hill? 5.5 filed at 04/03/2025 1337 5.5  filed at 02/19/2025 0723   Can you run a short distance? 0 filed at 04/03/2025 1337 8  filed at 02/19/2025 0723   Can you do light work around the house like dusting or washing dishes? 2.7 filed at 04/03/2025 " 1337 2.7  filed at 02/19/2025 0723   Can you do moderate work around the house like vacuuming, sweeping floors or carrying groceries? 3.5 filed at 04/03/2025 1337 3.5  filed at 02/19/2025 0723   Can you do heavy work around the house like scrubbing floors or lifting and moving heavy furniture?  0 filed at 04/03/2025 1337 0  filed at 02/19/2025 0723   Can you do yard work like raking leaves, weeding or pushing a mower? 0 filed at 04/03/2025 1337 4.5  filed at 02/19/2025 0723   Can you have sexual relations? 5.25 filed at 04/03/2025 1337 5.25  filed at 02/19/2025 0723   Can you participate in moderate recreational activities like golf, bowling, dancing, doubles tennis or throwing a baseball or football? 0 filed at 04/03/2025 1337 0  filed at 02/19/2025 0723   Can you participate in strenous sports like swimming, singles tennis, football, basketball, or skiing? 0 filed at 04/03/2025 1337 0  filed at 02/19/2025 0723   DASI SCORE 24.2 filed at 04/03/2025 1337 36.7  filed at 02/19/2025 0723   METS Score (Will be calculated only when all the questions are answered) 5.7 filed at 04/03/2025 1337 7.3  filed at 02/19/2025 0723          Caprini DVT Assessment      Flowsheet Row Pre-Admission Testing [place]25 [place] [facility] [place] [facility] [facility]Discharged) from 2/24/2025 in [facility] 3 with Nas Bronson, DO   DVT Score (IF A SCORE IS NOT CALCULATING, MUST SELECT A BMI TO COMPLETE) -- 3 filed at 02/25/2025 0133   Surgical Factors Major surgery planned, including arthroscopic and laproscopic (1-2 hours) filed at 04/03/2025 1335 --   BMI (BMI MUST BE CHOSEN) -- 30 or less filed at 02/25/2025 0133          Modified Frailty Index      Flowsheet Row Pre-Admission Testing [place] [place] [facility] [place]   Non-independent functional status (problems with dressing, bathing, personal grooming, or cooking) 0 filed at 04/03/2025 1337   History of diabetes mellitus  0 filed at 04/03/2025  1338   History of COPD 0 filed at 04/03/2025 1338   History of CHF No filed at 04/03/2025 1338   History of MI 0 filed at 04/03/2025 1338   History of Percutaneous Coronary Intervention, Cardiac Surgery, or Angina No filed at 04/03/2025 1338   Hypertension requiring the use of medication  0.0909 filed at 04/03/2025 1338   Peripheral vascular disease 0 filed at 04/03/2025 1338   Impaired sensorium (cognitive impairement or loss, clouding, or delirium) 0 filed at 04/03/2025 1338   TIA or CVA withouy residual deficit 0 filed at 04/03/2025 1338   Cerebrovascular accident with deficit 0 filed at 04/03/2025 1338   Modified Frailty Index Calculator .0909 filed at 04/03/2025 1338          [id number] Stroke Risk Points  Current as of just now        N/A 0 to 9 Points:      Last Change: N/A          The [id number] risk score (Lip NANCY, et al. 2009. © 2010 [employer]) quantifies the risk of stroke for a patient with atrial fibrillation. For patients without atrial fibrillation or under the age of 18 this score appears as N/A. Higher score values generally indicate higher risk of stroke.        This score is not applicable to this patient. Components are not calculated.          Revised Cardiac Risk Index      Flowsheet Row Pre-Admission Testing from 4/4/2025 in [facility]   High-Risk Surgery (Intraperitoneal, Intrathoracic,Suprainguinal vascular) 0 filed at 04/03/2025 1337   History of ischemic heart disease (History of MI, History of positive exercuse test, Current chest paint considered due to myocardial ischemia, Use of nitrate therapy, ECG with pathological Q Waves) 0 filed at 04/03/2025 1337   History of congestive heart failure (pulmonary edemia, bilateral rales or S3 gallop, Paroxysmal nocturnal dyspnea, CXR showing pulmonary vascular redistribution) 0 filed at 04/03/2025 1337   History of cerebrovascular disease (Prior TIA or stroke) 0 filed at 04/03/2025 1337   Pre-operative  insulin treatment 0 filed at 2025 1337   Pre-operative creatinine>2 mg/dl 0 filed at 2025 1337   Revised Cardiac Risk Calculator 0 filed at 2025 1337          Apfel Simplified Score      Flowsheet Row Pre-Admission Testing from 2025 in [facility] [place]   Smoking status 1 filed at 2025 1338   History of motion sickness or PONV  0 filed at 2025 1338   Use of postoperative opioids 0 filed at 2025 1338   Gender - Female 0=No filed at 2025 1338   Apfel Simplified Score Calculator 1 filed at 2025 1338          Risk Analysis Index Results This Encounter         4/3/2025  1338             Do you live in a place other than your own home?: 0    When did you begin living in the place you are currently residing?: Greater than one year ago    Any kidney failure, kidney not working well, or seeing a kidney doctor (nephrologist)? If yes, was this for kidney stones or another problem?: 0 No    Any history of chronic (long-term) congestive heart failure (CHF)?: 0 No    Any shortness of breath when resting?: 0 No    In the past five years, have you been diagnosed with or treated for cancer?: No    During the last 3 months has it become difficult for you to remember things or organize your thoughts?: 0 No    Have you lost weight of 10 pounds or more in the past 3 months without trying?: 0 No    Do you have any loss of appetitie?: 0 No    Getting Around (Mobility): 0 Can get around without help    Eatin Can plan and prepare own meals    Toiletin Can use toilet without any help    Personal Hygiene (Bathing, Hand Washing, Changing Clothes): 0 Can shower or bathe without any help    DA SILVA Cancer History: Patient does not indicate history of cancer    Total Risk Analysis Index Score Without Cancer: 21    Total Risk Analysis Index Score: 21          Stop Bang Score      Flowsheet Row Pre-Admission Testing from 2025 in [facility] [place] Questionnaire Series  Submission from 2/19/2025 in [facility] with Generic Provider Eun   Do you snore loudly? 0 filed at 04/03/2025 1336 0  filed at 02/19/2025 0723   Do you often feel tired or fatigued after your sleep? -- 1  filed at 02/19/2025 0723   Has anyone ever observed you stop breathing in your sleep? 0 filed at 04/03/2025 1336 0  filed at 02/19/2025 0723   Do you have or are you being treated for high blood pressure? 1 filed at 04/03/2025 1336 1  filed at 02/19/2025 0723   Recent BMI (Calculated) 30.7 filed at 04/03/2025 1336 30.7  filed at 02/19/2025 0723   Is BMI greater than 35 kg/m2? 0=No filed at 04/03/2025 1336 0=No  filed at 02/19/2025 0723   Age older than 50 years old? 1=Yes filed at 04/03/2025 1336 1=Yes  filed at 02/19/2025 0723   Is your neck circumference greater than 17 inches (Male) or 16 inches (Female)? 0 filed at 04/03/2025 1336 --   Gender - Male 1=Yes filed at 04/03/2025 1336 1=Yes  filed at 02/19/2025 0723          Prodigy: High Risk  Total Score: 16              Prodigy Age Score      Prodigy Gender Score          ARISCAT Score for Postoperative Pulmonary Complications      Flowsheet Row [place] [facility] [place]   Age Calculated Score 3 filed at 04/03/2025 1339   Preoperative SpO2 0 filed at 04/03/2025 1339   Respiratory infection in the last month Either upper or lower (i.e., URI, bronchitis, pneumonia), with fever and antibiotic treatment 0 filed at 04/03/2025 1339   Preoperative anemia (Hgb less than 10 g/dl) 0 filed at 04/03/2025 1339   Surgical incision  0 filed at 04/03/2025 1339   Duration of surgery  0 filed at 04/03/2025 1339   Emergency Procedure  0 filed at 04/03/2025 1339   ARISCAT Total Score  3 filed at 04/03/2025 3733          Jose Perioperative Risk for Myocardial Infarction or Cardiac Arrest (NAREN)      Flowsheet Row Pre-Ad[place] [facility] [place]   Calculated Age Score 1.26 filed at 04/03/2025 4916    Functional Status  0 filed at 2025 1339   ASA Class  -3.29 filed at 2025 1339   Creatinine 0 filed at 2025 1339   Type of Procedure  0.21 filed at 2025 1339   NAREN Total Score  -7.07 filed at 2025 1339   NAREN % 0.08 filed at 2025 1339            Assessment and Plan:   66 yo male scheduled for right C7-T1 foraminotomy and discectomy with Dr. Bellamy 2025. Labs/EKG ordered.    Cardiac  DASI Score: 24.2   MET Score: 5.7    HTN- controlled on current med regimen    RCRI  0 which is 3.9% 30 day risk of MACE (risk for cardiac death, nonfatal myocardial infarction, and nonfactal cardiac arrest)  NAREN score which indicates a 0.08  % risk of intraoperative or 30-day postoperative MACE    Preoperative deep breathing educational handout provided to patient.  STOP BAN  points which is a intermediate risk for moderate to severe PEE  ARISCAT:    3  points which is a intermediate (13.3%) risk of in-hospital post-op pulmonary complications     Apfel: 1 points 21% risk for post operative N/V    GI: recent hospitalization for colitis treated with antibiotics. Symptoms improved but 4 days ago he developed 5-6 episodes of diarrhea daily with strong odor and RLQ abdominal cramping. Labs ordered today along with Cdiff. Notified Dr. Bellamy via email. Instructed patient to notify Dr. Bellamy along with his GI doctor.     Skin check: Patient was instructed to make surgeon aware of any skin changes/concerns prior to surgery.     Anesthesia:  Patient denies any anesthesia complications.     See risk scores as previously documented.

## 2025-04-05 ENCOUNTER — LAB (OUTPATIENT)
Dept: LAB | Facility: HOSPITAL | Age: 65
End: 2025-04-05
Payer: COMMERCIAL

## 2025-04-05 LAB — C DIF TOX TCDA+TCDB STL QL NAA+PROBE: DETECTED

## 2025-04-05 PROCEDURE — 87493 C DIFF AMPLIFIED PROBE: CPT

## 2025-04-06 LAB — STAPHYLOCOCCUS SPEC CULT: NORMAL

## 2025-04-09 ENCOUNTER — OFFICE VISIT (OUTPATIENT)
Dept: PRIMARY CARE | Facility: CLINIC | Age: 65
End: 2025-04-09
Payer: COMMERCIAL

## 2025-04-09 VITALS
WEIGHT: 213.4 LBS | SYSTOLIC BLOOD PRESSURE: 134 MMHG | TEMPERATURE: 98.2 F | BODY MASS INDEX: 29.88 KG/M2 | DIASTOLIC BLOOD PRESSURE: 83 MMHG | HEIGHT: 71 IN | HEART RATE: 83 BPM | OXYGEN SATURATION: 97 %

## 2025-04-09 DIAGNOSIS — A04.72 C. DIFFICILE DIARRHEA: Primary | ICD-10-CM

## 2025-04-09 LAB
ATRIAL RATE: 59 BPM
P AXIS: 64 DEGREES
P OFFSET: 207 MS
P ONSET: 152 MS
PR INTERVAL: 120 MS
Q ONSET: 212 MS
QRS COUNT: 9 BEATS
QRS DURATION: 86 MS
QT INTERVAL: 410 MS
QTC CALCULATION(BAZETT): 405 MS
QTC FREDERICIA: 407 MS
R AXIS: -38 DEGREES
T AXIS: 34 DEGREES
T OFFSET: 417 MS
VENTRICULAR RATE: 59 BPM

## 2025-04-09 PROCEDURE — 99213 OFFICE O/P EST LOW 20 MIN: CPT | Performed by: NURSE PRACTITIONER

## 2025-04-09 PROCEDURE — 1036F TOBACCO NON-USER: CPT | Performed by: NURSE PRACTITIONER

## 2025-04-09 PROCEDURE — 3075F SYST BP GE 130 - 139MM HG: CPT | Performed by: NURSE PRACTITIONER

## 2025-04-09 PROCEDURE — 3008F BODY MASS INDEX DOCD: CPT | Performed by: NURSE PRACTITIONER

## 2025-04-09 PROCEDURE — 3079F DIAST BP 80-89 MM HG: CPT | Performed by: NURSE PRACTITIONER

## 2025-04-09 RX ORDER — VANCOMYCIN HYDROCHLORIDE 125 MG/1
125 CAPSULE ORAL 4 TIMES DAILY
Qty: 40 CAPSULE | Refills: 0 | Status: SHIPPED | OUTPATIENT
Start: 2025-04-09 | End: 2025-04-19

## 2025-04-09 ASSESSMENT — ENCOUNTER SYMPTOMS
FEVER: 0
SHORTNESS OF BREATH: 0
COUGH: 0
NECK PAIN: 1
NUMBNESS: 1
CHILLS: 0
NAUSEA: 0
WHEEZING: 0
CONSTIPATION: 0
ABDOMINAL PAIN: 0
VOMITING: 0
DIARRHEA: 1
ABDOMINAL DISTENTION: 0
PALPITATIONS: 0

## 2025-04-09 ASSESSMENT — PATIENT HEALTH QUESTIONNAIRE - PHQ9
10. IF YOU CHECKED OFF ANY PROBLEMS, HOW DIFFICULT HAVE THESE PROBLEMS MADE IT FOR YOU TO DO YOUR WORK, TAKE CARE OF THINGS AT HOME, OR GET ALONG WITH OTHER PEOPLE: SOMEWHAT DIFFICULT
2. FEELING DOWN, DEPRESSED OR HOPELESS: SEVERAL DAYS
1. LITTLE INTEREST OR PLEASURE IN DOING THINGS: SEVERAL DAYS
SUM OF ALL RESPONSES TO PHQ9 QUESTIONS 1 AND 2: 2

## 2025-04-09 NOTE — PROGRESS NOTES
"Subjective   Patient ID: Jesus Rand \"Josh" is a 65 y.o. male who presents for discuss lab results (From pre admission test for cervical discectomy ).  64 yo male presents to discuss pre-surgical lab results for right C7-T1 Foraminotomy and Discectomy.  Pt reports he has had loose stools x 2 weeks, at least 3 x per day.  He feels it has let up as initially he had 10 stools per day.  Denies any abd pain.  Reports he was in the hospital the end of Feb for colitis and was on IV antibiotics for 3 days and then sent home on oral ATB.           Review of Systems   Constitutional:  Negative for chills and fever.   Respiratory:  Negative for cough, shortness of breath and wheezing.    Cardiovascular:  Negative for chest pain and palpitations.   Gastrointestinal:  Positive for diarrhea. Negative for abdominal distention, abdominal pain, constipation, nausea and vomiting.   Musculoskeletal:  Positive for neck pain.   Neurological:  Positive for numbness.       Objective   Physical Exam  Vitals and nursing note reviewed.   Constitutional:       General: He is not in acute distress.  HENT:      Head: Normocephalic.   Cardiovascular:      Rate and Rhythm: Normal rate and regular rhythm.      Pulses: Normal pulses.      Heart sounds: Normal heart sounds. No murmur heard.     No friction rub. No gallop.   Pulmonary:      Effort: Pulmonary effort is normal.      Breath sounds: Normal breath sounds. No wheezing, rhonchi or rales.   Abdominal:      General: Bowel sounds are normal. There is no distension.      Tenderness: There is no abdominal tenderness. There is no guarding.   Musculoskeletal:      Right lower leg: No edema.      Left lower leg: No edema.   Skin:     General: Skin is warm and dry.   Neurological:      Mental Status: He is alert.   Psychiatric:         Mood and Affect: Mood normal.       /83   Pulse 83   Ht 1.803 m (5' 11\")   Wt 96.8 kg (213 lb 6.4 oz)   SpO2 97%   BMI 29.76 kg/m²       Current " Outpatient Medications:     amLODIPine (Norvasc) 10 mg tablet, Take 1 tablet (10 mg) by mouth once daily., Disp: 90 tablet, Rfl: 3    aspirin 81 mg EC tablet, TAKE 1 TABLET BY MOUTH EVERY DAY, Disp: 90 tablet, Rfl: 1    carvedilol (Coreg) 12.5 mg tablet, Take 1 tablet (12.5 mg) by mouth 2 times a day with meals., Disp: 180 tablet, Rfl: 3    chlorhexidine (Peridex) 0.12 % solution, 15 milliliter(s) orally once a day for 2 doses 15 ml  the night before surgery and 15 ml morning of surgery - swish for 30 seconds -DO NOT SWALLOW, SPIT OUT, Disp: 473 mL, Rfl: 0    chlorthalidone (Hygroton) 25 mg tablet, Take 1 tablet (25 mg) by mouth once daily., Disp: 90 tablet, Rfl: 3    dicyclomine (Bentyl) 20 mg tablet, Take 1 tablet (20 mg) by mouth 4 times a day before meals., Disp: 30 tablet, Rfl: 0    ibuprofen-diphenhydramine HCl (Ibuprofen PM) 200-25 mg capsule per capsule, Take 2 capsules by mouth once daily at bedtime., Disp: , Rfl:     ondansetron ODT (Zofran-ODT) 4 mg disintegrating tablet, Take 1 tablet (4 mg) by mouth every 8 hours if needed for nausea or vomiting., Disp: 30 tablet, Rfl: 0    pantoprazole (ProtoNix) 40 mg EC tablet, Take 1 tablet (40 mg) by mouth 2 times a day. Do not crush, chew, or split., Disp: 60 tablet, Rfl: 11    cyclobenzaprine (Flexeril) 10 mg tablet, Take 1 tablet (10 mg) by mouth 3 times a day as needed for muscle spasms for up to 10 days. (Patient not taking: Reported on 4/9/2025), Disp: 30 tablet, Rfl: 0    vancomycin (Vancocin) 125 mg capsule, Take 1 capsule (125 mg) by mouth 4 times a day for 10 days., Disp: 40 capsule, Rfl: 0   Past Medical History:   Diagnosis Date    Allergic childhood    Angina pectoris     from pinched nerve    Anxiety May of 2022    Arthritis May of 2022    Depression May of 2022    GERD (gastroesophageal reflux disease)     Hypertension     Spinal stenosis       Past Surgical History:   Procedure Laterality Date    APPENDECTOMY  1979    HIP ARTHROPLASTY      JOINT  REPLACEMENT  Feb 2024    OTHER SURGICAL HISTORY  09/20/2019    Appendectomy    ROTATOR CUFF REPAIR  1998    left shoulder    SHOULDER SURGERY Left     TOTAL HIP ARTHROPLASTY Left         Family History   Problem Relation Name Age of Onset    Arthritis Mother Cheryl Rand     Stroke Mother Cheryl Rand     COPD Father Javan Rand     No Known Problems Sister      Accidental death Brother Javan Rand .         Assessment/Plan   Problem List Items Addressed This Visit       C. difficile diarrhea - Primary    Relevant Medications    vancomycin (Vancocin) 125 mg capsule    Other Relevant Orders    C. difficile, PCR   May also get probiotic otc.  Repeat c diff after completion of ATB.  Pt will notify his orthopedic surgeon.  Follow up in 2 wks.

## 2025-04-21 ENCOUNTER — PATIENT OUTREACH (OUTPATIENT)
Dept: PRIMARY CARE | Facility: CLINIC | Age: 65
End: 2025-04-21
Payer: COMMERCIAL

## 2025-04-23 ENCOUNTER — HOSPITAL ENCOUNTER (OUTPATIENT)
Dept: RADIOLOGY | Facility: HOSPITAL | Age: 65
Discharge: HOME | End: 2025-04-23
Payer: COMMERCIAL

## 2025-04-23 ENCOUNTER — APPOINTMENT (OUTPATIENT)
Dept: PRIMARY CARE | Facility: CLINIC | Age: 65
End: 2025-04-23
Payer: COMMERCIAL

## 2025-04-23 VITALS
WEIGHT: 220.1 LBS | TEMPERATURE: 98.1 F | HEART RATE: 66 BPM | SYSTOLIC BLOOD PRESSURE: 145 MMHG | HEIGHT: 71 IN | DIASTOLIC BLOOD PRESSURE: 88 MMHG | OXYGEN SATURATION: 96 % | BODY MASS INDEX: 30.81 KG/M2

## 2025-04-23 DIAGNOSIS — A04.72 C. DIFFICILE DIARRHEA: ICD-10-CM

## 2025-04-23 DIAGNOSIS — I16.1 HYPERTENSIVE EMERGENCY: ICD-10-CM

## 2025-04-23 DIAGNOSIS — I10 PRIMARY HYPERTENSION: ICD-10-CM

## 2025-04-23 DIAGNOSIS — M48.02 FORAMINAL STENOSIS OF CERVICAL REGION: ICD-10-CM

## 2025-04-23 PROCEDURE — 3008F BODY MASS INDEX DOCD: CPT

## 2025-04-23 PROCEDURE — 3079F DIAST BP 80-89 MM HG: CPT

## 2025-04-23 PROCEDURE — 3077F SYST BP >= 140 MM HG: CPT

## 2025-04-23 PROCEDURE — 99214 OFFICE O/P EST MOD 30 MIN: CPT

## 2025-04-23 PROCEDURE — 1036F TOBACCO NON-USER: CPT

## 2025-04-23 PROCEDURE — 71046 X-RAY EXAM CHEST 2 VIEWS: CPT

## 2025-04-23 RX ORDER — CARVEDILOL 12.5 MG/1
12.5 TABLET ORAL
Qty: 180 TABLET | Refills: 3 | Status: SHIPPED | OUTPATIENT
Start: 2025-04-23 | End: 2026-04-23

## 2025-04-23 RX ORDER — AMLODIPINE BESYLATE 10 MG/1
10 TABLET ORAL DAILY
Qty: 90 TABLET | Refills: 3 | Status: SHIPPED | OUTPATIENT
Start: 2025-04-23 | End: 2026-04-23

## 2025-04-23 ASSESSMENT — ENCOUNTER SYMPTOMS
CONSTIPATION: 0
FREQUENCY: 0
SHORTNESS OF BREATH: 0
POLYDIPSIA: 0
WOUND: 0
NECK PAIN: 1
MYALGIAS: 0
NUMBNESS: 0
DIFFICULTY URINATING: 0
FATIGUE: 0
WEAKNESS: 0
ARTHRALGIAS: 0
DIARRHEA: 0
CHEST TIGHTNESS: 0
COUGH: 1
NERVOUS/ANXIOUS: 0
HEADACHES: 0
CHILLS: 0
DIAPHORESIS: 0
TROUBLE SWALLOWING: 0
UNEXPECTED WEIGHT CHANGE: 0
POLYPHAGIA: 0
RECTAL PAIN: 0
PALPITATIONS: 0
ABDOMINAL PAIN: 0
NAUSEA: 0

## 2025-04-23 ASSESSMENT — PATIENT HEALTH QUESTIONNAIRE - PHQ9
SUM OF ALL RESPONSES TO PHQ9 QUESTIONS 1 AND 2: 0
1. LITTLE INTEREST OR PLEASURE IN DOING THINGS: NOT AT ALL
2. FEELING DOWN, DEPRESSED OR HOPELESS: NOT AT ALL

## 2025-04-23 NOTE — PROGRESS NOTES
Subjective   Patient ID: David Rand is a 65 y.o. male who presents for 2 week (Pt is here today for 2 week fuv. Had C-diff 04/09/25. Also reports he has a head cold today. ).    Patient presents for follow-up, C. difficile and presurgical.  Acute complaint of rhinorrhea.    Seasonal allergies: Has had some postnasal drip and cough.  Cough is nonproductive, no fever.  Started when he was outside the last few days.  Symptoms are likely seasonal allergies.  He will call and report if there is any worsening.  Reviewed OTC medications for treatment.    C. difficile: Was seen and treated with vancomycin.  Has formed stools with no abnormalities now.  Medication regimen is completed.  Provide education for identification of recurrent symptoms.    Cervicalgia: Patient has cervical neck pain radiating to his right chest and arm, this is chronic in nature and he is seeing Dr. Ravi Bellamy in 1 week for surgical intervention.  Has already had preoperative testing and clearance.  He is safe to proceed with procedure.         Review of Systems   Constitutional:  Negative for chills, diaphoresis, fatigue and unexpected weight change.   HENT:  Positive for postnasal drip. Negative for dental problem, tinnitus and trouble swallowing.    Eyes:  Negative for visual disturbance.   Respiratory:  Positive for cough. Negative for chest tightness and shortness of breath.    Cardiovascular:  Positive for chest pain (chronic, radiates from neck). Negative for palpitations and leg swelling.   Gastrointestinal:  Negative for abdominal pain, constipation, diarrhea, nausea and rectal pain.   Endocrine: Negative for polydipsia, polyphagia and polyuria.   Genitourinary:  Negative for difficulty urinating, frequency and urgency.   Musculoskeletal:  Positive for neck pain. Negative for arthralgias and myalgias.   Skin:  Negative for pallor and wound.   Neurological:  Negative for syncope, weakness, numbness and headaches.   Psychiatric/Behavioral:  " Negative for suicidal ideas. The patient is not nervous/anxious.        Objective   /88   Pulse 66   Temp 36.7 °C (98.1 °F)   Ht 1.803 m (5' 11\")   Wt 99.8 kg (220 lb 1.6 oz)   SpO2 96%   BMI 30.70 kg/m²     Physical Exam  Vitals and nursing note reviewed.   Constitutional:       General: He is not in acute distress.     Appearance: Normal appearance.   HENT:      Head: Normocephalic.      Nose: Nose normal.      Mouth/Throat:      Mouth: Mucous membranes are moist.      Pharynx: Oropharynx is clear.   Eyes:      General: No scleral icterus.     Pupils: Pupils are equal, round, and reactive to light.   Neck:      Vascular: No carotid bruit.   Cardiovascular:      Rate and Rhythm: Normal rate and regular rhythm.      Pulses: Normal pulses.      Heart sounds: Normal heart sounds. No murmur heard.  Pulmonary:      Effort: Pulmonary effort is normal. No respiratory distress.      Breath sounds: Normal breath sounds. No stridor. No wheezing, rhonchi or rales.   Abdominal:      General: Bowel sounds are normal. There is no distension.      Palpations: Abdomen is soft.      Tenderness: There is no abdominal tenderness. There is no right CVA tenderness or left CVA tenderness.   Musculoskeletal:         General: No swelling.      Cervical back: Normal range of motion.      Right lower leg: No edema.      Left lower leg: No edema.      Comments: Reduced range of motion of neck   Skin:     General: Skin is warm and dry.      Capillary Refill: Capillary refill takes less than 2 seconds.   Neurological:      General: No focal deficit present.      Mental Status: He is alert and oriented to person, place, and time. Mental status is at baseline.   Psychiatric:         Mood and Affect: Mood normal.         Behavior: Behavior normal.         Thought Content: Thought content normal.         Judgment: Judgment normal.         Assessment/Plan   Diagnoses and all orders for this visit:  Primary hypertension  -     " amLODIPine (Norvasc) 10 mg tablet; Take 1 tablet (10 mg) by mouth once daily.  -     carvedilol (Coreg) 12.5 mg tablet; Take 1 tablet (12.5 mg) by mouth 2 times daily (morning and late afternoon).  Hypertensive emergency  -     amLODIPine (Norvasc) 10 mg tablet; Take 1 tablet (10 mg) by mouth once daily.  -     carvedilol (Coreg) 12.5 mg tablet; Take 1 tablet (12.5 mg) by mouth 2 times daily (morning and late afternoon).

## 2025-04-24 LAB — C DIFF TOX GENS STL QL NAA+PROBE: NOT DETECTED

## 2025-05-01 ENCOUNTER — HOSPITAL ENCOUNTER (OUTPATIENT)
Facility: HOSPITAL | Age: 65
Setting detail: OUTPATIENT SURGERY
Discharge: HOME | End: 2025-05-01
Attending: STUDENT IN AN ORGANIZED HEALTH CARE EDUCATION/TRAINING PROGRAM | Admitting: STUDENT IN AN ORGANIZED HEALTH CARE EDUCATION/TRAINING PROGRAM
Payer: COMMERCIAL

## 2025-05-01 VITALS
DIASTOLIC BLOOD PRESSURE: 80 MMHG | OXYGEN SATURATION: 97 % | RESPIRATION RATE: 16 BRPM | SYSTOLIC BLOOD PRESSURE: 130 MMHG | BODY MASS INDEX: 30.8 KG/M2 | WEIGHT: 220 LBS | TEMPERATURE: 97.9 F | HEART RATE: 69 BPM | HEIGHT: 71 IN

## 2025-05-01 DIAGNOSIS — M48.02 FORAMINAL STENOSIS OF CERVICAL REGION: ICD-10-CM

## 2025-05-01 PROCEDURE — 2500000005 HC RX 250 GENERAL PHARMACY W/O HCPCS: Performed by: STUDENT IN AN ORGANIZED HEALTH CARE EDUCATION/TRAINING PROGRAM

## 2025-05-01 PROCEDURE — 2500000001 HC RX 250 WO HCPCS SELF ADMINISTERED DRUGS (ALT 637 FOR MEDICARE OP): Performed by: STUDENT IN AN ORGANIZED HEALTH CARE EDUCATION/TRAINING PROGRAM

## 2025-05-01 PROCEDURE — 2500000002 HC RX 250 W HCPCS SELF ADMINISTERED DRUGS (ALT 637 FOR MEDICARE OP, ALT 636 FOR OP/ED): Performed by: STUDENT IN AN ORGANIZED HEALTH CARE EDUCATION/TRAINING PROGRAM

## 2025-05-01 RX ORDER — TRANEXAMIC ACID 650 MG/1
1300 TABLET ORAL ONCE
Status: COMPLETED | OUTPATIENT
Start: 2025-05-01 | End: 2025-05-01

## 2025-05-01 RX ORDER — CELECOXIB 200 MG/1
400 CAPSULE ORAL ONCE
Status: COMPLETED | OUTPATIENT
Start: 2025-05-01 | End: 2025-05-01

## 2025-05-01 RX ORDER — ACETAMINOPHEN 325 MG/1
975 TABLET ORAL ONCE
Status: COMPLETED | OUTPATIENT
Start: 2025-05-01 | End: 2025-05-01

## 2025-05-01 RX ADMIN — ACETAMINOPHEN 975 MG: 325 TABLET ORAL at 11:11

## 2025-05-01 RX ADMIN — TRANEXAMIC ACID 1300 MG: 650 TABLET ORAL at 11:11

## 2025-05-01 RX ADMIN — CELECOXIB 400 MG: 200 CAPSULE ORAL at 11:11

## 2025-05-01 RX ADMIN — POVIDONE-IODINE 1 APPLICATION: 5 SOLUTION TOPICAL at 11:11

## 2025-05-01 ASSESSMENT — PAIN - FUNCTIONAL ASSESSMENT: PAIN_FUNCTIONAL_ASSESSMENT: 0-10

## 2025-05-01 ASSESSMENT — PAIN SCALES - GENERAL: PAINLEVEL_OUTOF10: 0 - NO PAIN

## 2025-05-01 NOTE — PROGRESS NOTES
"Jesus Rand \"Josh" is a 65 y.o. male on day 0 of admission presenting with Foraminal stenosis of cervical region.    Subjective   Patient today has no symptoms. Minimal neck and arm pain at all. Pain totally resolved 1-2 weeks ago. On no medications. Will call back if return of symptoms for surgery.          Objective     Physical Exam    Last Recorded Vitals  Blood pressure 130/80, pulse 69, temperature 36.6 °C (97.9 °F), temperature source Temporal, resp. rate 16, height 1.803 m (5' 11\"), weight 99.8 kg (220 lb), SpO2 97%.  Intake/Output last 3 Shifts:  No intake/output data recorded.    Relevant Results  Scheduled medications  Scheduled Medications[1]  Continuous medications  Continuous Medications[2]  PRN medications  PRN Medications[3]    No results found for this or any previous visit (from the past 24 hours).    Imaging  No results found.    Cardiology, Vascular, and Other Imaging  No other imaging results found for the past 7 days                             Assessment & Plan  Foraminal stenosis of cervical region               [1] [2] [3]   "

## 2025-05-05 ENCOUNTER — APPOINTMENT (OUTPATIENT)
Facility: CLINIC | Age: 65
End: 2025-05-05
Payer: COMMERCIAL

## 2025-05-19 ENCOUNTER — PATIENT OUTREACH (OUTPATIENT)
Dept: PRIMARY CARE | Facility: CLINIC | Age: 65
End: 2025-05-19
Payer: COMMERCIAL

## 2025-05-19 ENCOUNTER — APPOINTMENT (OUTPATIENT)
Facility: CLINIC | Age: 65
End: 2025-05-19
Payer: COMMERCIAL

## 2025-05-29 ENCOUNTER — APPOINTMENT (OUTPATIENT)
Dept: GASTROENTEROLOGY | Facility: CLINIC | Age: 65
End: 2025-05-29
Payer: COMMERCIAL

## 2025-05-29 VITALS
HEIGHT: 71 IN | SYSTOLIC BLOOD PRESSURE: 138 MMHG | BODY MASS INDEX: 31.58 KG/M2 | DIASTOLIC BLOOD PRESSURE: 80 MMHG | HEART RATE: 64 BPM | WEIGHT: 225.6 LBS

## 2025-05-29 DIAGNOSIS — A04.72 C. DIFFICILE DIARRHEA: Primary | ICD-10-CM

## 2025-05-29 PROCEDURE — 3075F SYST BP GE 130 - 139MM HG: CPT | Performed by: INTERNAL MEDICINE

## 2025-05-29 PROCEDURE — 99214 OFFICE O/P EST MOD 30 MIN: CPT | Performed by: INTERNAL MEDICINE

## 2025-05-29 PROCEDURE — 3008F BODY MASS INDEX DOCD: CPT | Performed by: INTERNAL MEDICINE

## 2025-05-29 PROCEDURE — 1036F TOBACCO NON-USER: CPT | Performed by: INTERNAL MEDICINE

## 2025-05-29 PROCEDURE — 3079F DIAST BP 80-89 MM HG: CPT | Performed by: INTERNAL MEDICINE

## 2025-05-29 ASSESSMENT — ENCOUNTER SYMPTOMS
NEUROLOGICAL NEGATIVE: 1
RESPIRATORY NEGATIVE: 1
HEMATOLOGIC/LYMPHATIC NEGATIVE: 1
PSYCHIATRIC NEGATIVE: 1
CONSTITUTIONAL NEGATIVE: 1
CARDIOVASCULAR NEGATIVE: 1
ENDOCRINE NEGATIVE: 1
GASTROINTESTINAL NEGATIVE: 1
EYES NEGATIVE: 1
MUSCULOSKELETAL NEGATIVE: 1

## 2025-05-29 NOTE — PROGRESS NOTES
"Subjective   Patient ID: Jesus Rand \"David\" is a 65 y.o. male who presents for New Patient Visit (ER/ hospital follow up for n/s and colitis. Patient was given antibiotics. Patient had C.diff from the strong antibiotic treated with vancomycin and now is negative.  ).  ANGEL Hernandez is a pleasant 65-year-old male who I did a colonoscopy on 6 years ago.  Colonoscopy unremarkable.  Recently hospitalized for pancolitis with diarrhea vomiting.  While hospitalized was treated broad-spectrum antibiotics developed C. difficile which responded to round of vancomycin.  He is currently asymptomatic.  Is having no diarrhea no vomiting.  Symptoms lasted 3 days while hospitalized.  Did have some diarrhea with C. difficile but not to the degree he thought he should.  He had follow-up C. difficile testing which was negative    Review of Systems   Constitutional: Negative.    HENT: Negative.     Eyes: Negative.    Respiratory: Negative.     Cardiovascular: Negative.    Gastrointestinal: Negative.    Endocrine: Negative.    Genitourinary: Negative.    Musculoskeletal: Negative.    Neurological: Negative.    Hematological: Negative.    Psychiatric/Behavioral: Negative.         Objective   Physical Exam  Constitutional:       General: He is awake.      Appearance: Normal appearance.   HENT:      Head: Normocephalic and atraumatic.      Nose: Nose normal.      Mouth/Throat:      Mouth: Mucous membranes are moist.   Eyes:      Pupils: Pupils are equal, round, and reactive to light.   Neck:      Thyroid: No thyroid mass.      Trachea: Phonation normal.   Cardiovascular:      Rate and Rhythm: Normal rate and regular rhythm.      Heart sounds: Normal heart sounds.   Pulmonary:      Effort: Pulmonary effort is normal. No respiratory distress.      Breath sounds: Normal air entry. No decreased breath sounds, wheezing, rhonchi or rales.   Abdominal:      General: Bowel sounds are normal. There is no distension.      Palpations: Abdomen is soft.    "   Tenderness: There is no abdominal tenderness.   Musculoskeletal:      Cervical back: Neck supple.      Right lower leg: No edema.      Left lower leg: No edema.   Skin:     General: Skin is warm.      Capillary Refill: Capillary refill takes less than 2 seconds.   Neurological:      General: No focal deficit present.      Mental Status: He is alert and oriented to person, place, and time. Mental status is at baseline.      Cranial Nerves: Cranial nerves 2-12 are intact.      Motor: Motor function is intact.   Psychiatric:         Attention and Perception: Attention and perception normal.         Mood and Affect: Mood normal.         Speech: Speech normal.         Behavior: Behavior normal.         Assessment/Plan   Diagnoses and all orders for this visit:  C. difficile diarrhea    Patient had self-limiting colitis followed up with C. difficile diarrhea.  He is asymptomatic this time.  I informed there is no need for colonoscopy unless symptoms change.  I did inform he is at risk for C. difficile the rest of his life he should continue probiotics.  Avoid antibiotics unless absolutely necessary and if on antibiotic continue probiotics twice daily dosing.  He will follow-up on an as-needed basis       Kyaw Michael DO 05/29/25 4:40 PM

## 2025-07-07 ENCOUNTER — APPOINTMENT (OUTPATIENT)
Facility: CLINIC | Age: 65
End: 2025-07-07
Payer: COMMERCIAL

## 2026-02-26 ENCOUNTER — APPOINTMENT (OUTPATIENT)
Dept: ORTHOPEDIC SURGERY | Facility: CLINIC | Age: 66
End: 2026-02-26
Payer: COMMERCIAL

## (undated) DEVICE — ELECTRODE, ELECTROSURGICAL, BLADE EXT 4 INCH, INSULATED

## (undated) DEVICE — RESERVOIR, WOUND, W/TROCAR, PVC, MEDIUM, 400CC, DAVOL, 1/8 IN, 10FR

## (undated) DEVICE — Device

## (undated) DEVICE — DRAPE, HIP, ORTHO ARTS, W/POCKET

## (undated) DEVICE — SUTURE, VICRYL, 0, 18 IN, CT-1, UNDYED

## (undated) DEVICE — TRAY, SPINAL, PENCAN 25 GA X 3.5"

## (undated) DEVICE — APPLICATOR, CHLORAPREP, W/ORANGE TINT, 26ML

## (undated) DEVICE — OINTMENT, TOPICAL, BACITRACIN

## (undated) DEVICE — SUTURE, VICRYL, 2-0, 27 IN, SH, UNDYED

## (undated) DEVICE — STRAP, KNEE AND BODY, SINGLE USE

## (undated) DEVICE — DRAPE, SHEET, U, STERI DRAPE, 47 X 51 IN, DISPOSABLE, STERILE

## (undated) DEVICE — GLOVE, SURGICAL, PROTEXIS PI , 8.5, PF, LF

## (undated) DEVICE — COVER, MAYO STAND, 23-1/2 X 56, LF

## (undated) DEVICE — GLOVE, SURGICAL, PROTEXIS PI MICRO, 8.5, PF, LF

## (undated) DEVICE — RETRIEVER, SUTURE, HEWSON

## (undated) DEVICE — ORTHO CORD, P2, OS-6 NEEDLE, 36"

## (undated) DEVICE — CIRCUIT, BREATHING, ADULT, B/V FILTER, HMEF, 3 L BAG, 108 IN

## (undated) DEVICE — BLANKET, HYPERTHERMIA, UPPER BODY

## (undated) DEVICE — WOUND SYSTEM, DEBRIDEMENT & CLEANING, O.R DUOPAK

## (undated) DEVICE — PIN, SKULL, MAYFIELD, ADULT

## (undated) DEVICE — TRAY, SURESTEP, SILICONE DRAINAGE BAG, STATLOCK, 16FR

## (undated) DEVICE — DRAPE, SHEET, 17 X 23 IN

## (undated) DEVICE — SUTURE, ETHIBOND, 2, 30 IN, CT2, GREEN

## (undated) DEVICE — SYRINGE, 30 CC, LUER LOCK

## (undated) DEVICE — SUTURE, SILK, 2-0, 18 IN, FSL, BLACK

## (undated) DEVICE — SUTURE, PROLENE, 6-0, 24 IN, BV1, DA, BLUE

## (undated) DEVICE — SUTURE, POLYSORB 2/0  36  UNDYED  GS-21  209P"

## (undated) DEVICE — TOGA, FLYTE, ZIPPER, XL, W/PEELAWAY

## (undated) DEVICE — BAG, DECANTER

## (undated) DEVICE — BLADE, SAW, SAGITTAL, DUAL CUT, 18 X 90 X 1.27

## (undated) DEVICE — GLOVE, SURGICAL, PROTEXIS PI BLUE W/NEUTHERA, 8.5, PF, LF

## (undated) DEVICE — DRAPE, INCISE, ANTIMICROBIAL, IOBAN 2, LARGE, 17 X 23 IN, DISPOSABLE, STERILE

## (undated) DEVICE — TOGA, FLYTE, ZIPPER, 2XL, W/PEELAWAY

## (undated) DEVICE — SUTURE, VICRYL, 0, 27 IN, CP-1, UNDYED

## (undated) DEVICE — GLOVE, PROTEXIS PI CLASSIC, SZ-8.0, PF, PF, LF

## (undated) DEVICE — NEEDLE, HYPO, 22G X 1 1/2IN, ECLIP, LF

## (undated) DEVICE — TOWEL, OR, XRAY DECTECTABLE, 17 X 27, BLUE, STERILE

## (undated) DEVICE — TUBING, IRRIGATION, HIGH FLOW, HAND PIECE SET

## (undated) DEVICE — DRAPE, LAPAROTOMY II, PEDIATRIC

## (undated) DEVICE — SUTURE, MONOCRYL, 4-0, 18 IN, PS2, UNDYED

## (undated) DEVICE — PAD, GROUNDING, ELECTROSURGICAL, W/9 FT CABLE, POLYHESIVE II, ADULT, LF

## (undated) DEVICE — TOWEL PACK, STERILE, 4/PACK, BLUE

## (undated) DEVICE — SUTURE, ETHIBOND, 5, 30 IN, V40, MULTIPACK, GREEN

## (undated) DEVICE — SYSTEM, PICO 4 X 16 IN (10X40CM)